# Patient Record
Sex: FEMALE | Race: WHITE | NOT HISPANIC OR LATINO | Employment: OTHER | URBAN - METROPOLITAN AREA
[De-identification: names, ages, dates, MRNs, and addresses within clinical notes are randomized per-mention and may not be internally consistent; named-entity substitution may affect disease eponyms.]

---

## 2017-06-12 ENCOUNTER — LAB (OUTPATIENT)
Dept: LAB | Facility: HOSPITAL | Age: 67
End: 2017-06-12
Attending: INTERNAL MEDICINE

## 2017-06-12 ENCOUNTER — OFFICE VISIT (OUTPATIENT)
Dept: ONCOLOGY | Facility: CLINIC | Age: 67
End: 2017-06-12
Attending: INTERNAL MEDICINE

## 2017-06-12 VITALS
TEMPERATURE: 98.1 F | DIASTOLIC BLOOD PRESSURE: 82 MMHG | BODY MASS INDEX: 35.53 KG/M2 | RESPIRATION RATE: 18 BRPM | WEIGHT: 188.2 LBS | SYSTOLIC BLOOD PRESSURE: 128 MMHG | HEIGHT: 61 IN | OXYGEN SATURATION: 98 % | HEART RATE: 66 BPM

## 2017-06-12 DIAGNOSIS — C50.412 MALIGNANT NEOPLASM OF UPPER-OUTER QUADRANT OF LEFT FEMALE BREAST (HCC): Primary | ICD-10-CM

## 2017-06-12 LAB
ALBUMIN SERPL-MCNC: 4.3 G/DL (ref 3.5–5.2)
ALBUMIN/GLOB SERPL: 1.4 G/DL (ref 1.1–2.4)
ALP SERPL-CCNC: 84 U/L (ref 38–116)
ALT SERPL W P-5'-P-CCNC: 23 U/L (ref 0–33)
ANION GAP SERPL CALCULATED.3IONS-SCNC: 13.5 MMOL/L
AST SERPL-CCNC: 22 U/L (ref 0–32)
BASOPHILS # BLD AUTO: 0.05 10*3/MM3 (ref 0–0.1)
BASOPHILS NFR BLD AUTO: 0.7 % (ref 0–1.1)
BILIRUB SERPL-MCNC: 0.3 MG/DL (ref 0.1–1.2)
BUN BLD-MCNC: 15 MG/DL (ref 6–20)
BUN/CREAT SERPL: 19.2 (ref 7.3–30)
CALCIUM SPEC-SCNC: 9.5 MG/DL (ref 8.5–10.2)
CHLORIDE SERPL-SCNC: 99 MMOL/L (ref 98–107)
CO2 SERPL-SCNC: 27.5 MMOL/L (ref 22–29)
CREAT BLD-MCNC: 0.78 MG/DL (ref 0.6–1.1)
DEPRECATED RDW RBC AUTO: 44.8 FL (ref 37–49)
EOSINOPHIL # BLD AUTO: 0.09 10*3/MM3 (ref 0–0.36)
EOSINOPHIL NFR BLD AUTO: 1.3 % (ref 1–5)
ERYTHROCYTE [DISTWIDTH] IN BLOOD BY AUTOMATED COUNT: 13.4 % (ref 11.7–14.5)
GFR SERPL CREATININE-BSD FRML MDRD: 73 ML/MIN/1.73
GLOBULIN UR ELPH-MCNC: 3 GM/DL (ref 1.8–3.5)
GLUCOSE BLD-MCNC: 99 MG/DL (ref 74–124)
HCT VFR BLD AUTO: 41.2 % (ref 34–45)
HGB BLD-MCNC: 13.8 G/DL (ref 11.5–14.9)
HOLD SPECIMEN: NORMAL
IMM GRANULOCYTES # BLD: 0.02 10*3/MM3 (ref 0–0.03)
IMM GRANULOCYTES NFR BLD: 0.3 % (ref 0–0.5)
LDH SERPL-CCNC: 193 U/L (ref 99–259)
LYMPHOCYTES # BLD AUTO: 2.03 10*3/MM3 (ref 1–3.5)
LYMPHOCYTES NFR BLD AUTO: 29.7 % (ref 20–49)
MCH RBC QN AUTO: 30.3 PG (ref 27–33)
MCHC RBC AUTO-ENTMCNC: 33.5 G/DL (ref 32–35)
MCV RBC AUTO: 90.5 FL (ref 83–97)
MONOCYTES # BLD AUTO: 0.44 10*3/MM3 (ref 0.25–0.8)
MONOCYTES NFR BLD AUTO: 6.4 % (ref 4–12)
NEUTROPHILS # BLD AUTO: 4.2 10*3/MM3 (ref 1.5–7)
NEUTROPHILS NFR BLD AUTO: 61.6 % (ref 39–75)
NRBC BLD MANUAL-RTO: 0 /100 WBC (ref 0–0)
PLATELET # BLD AUTO: 200 10*3/MM3 (ref 150–375)
PMV BLD AUTO: 9.8 FL (ref 8.9–12.1)
POTASSIUM BLD-SCNC: 4.7 MMOL/L (ref 3.5–4.7)
PROT SERPL-MCNC: 7.3 G/DL (ref 6.3–8)
RBC # BLD AUTO: 4.55 10*6/MM3 (ref 3.9–5)
SODIUM BLD-SCNC: 140 MMOL/L (ref 134–145)
URATE SERPL-MCNC: 7.3 MG/DL (ref 2.8–7.4)
WBC NRBC COR # BLD: 6.83 10*3/MM3 (ref 4–10)

## 2017-06-12 PROCEDURE — 85025 COMPLETE CBC W/AUTO DIFF WBC: CPT

## 2017-06-12 PROCEDURE — 99213 OFFICE O/P EST LOW 20 MIN: CPT | Performed by: INTERNAL MEDICINE

## 2017-06-12 PROCEDURE — 80053 COMPREHEN METABOLIC PANEL: CPT

## 2017-06-12 PROCEDURE — 36415 COLL VENOUS BLD VENIPUNCTURE: CPT

## 2017-06-12 PROCEDURE — 84550 ASSAY OF BLOOD/URIC ACID: CPT

## 2017-06-12 PROCEDURE — 83615 LACTATE (LD) (LDH) ENZYME: CPT

## 2017-06-12 NOTE — PROGRESS NOTES
Subjective     REASONS FOR FOLLOWUP:     1. T4N3, ER/TX negative, HER2 positive breast cancer; received dose-dense Adriamycin and Cytoxan and Taxol as well as Herceptin and radiation therapy, further total of a year of Herceptin.   2. Mild decrease in EF although still within the normal range felt to be insignificant.   3. Chronic cough with negative CT scans and normal PFTs in 06/2010 and 12/2010.   4. Genetic testing deferred due to cost.   5. Left axillary skin lesion showing atypical vascular proliferation related to radiation. No signs of overt malignancy.   :     History of Present Illness  patient is a 65-year-old female with a high-risk ER/TX negative HER-2 positive left breast cancer T4 N 3 treated with adjuvant dose dense Adriamycin Cytoxan and Taxol Herceptin and radiation COMPLETED 6 years ago.  She is here for routine follow-up and is doing amazingly well.  Her left arm lymphedema is still an issue and she wraps and wears sleeve to keep it under control.  She is up-to-date with colonoscopies.  She is trying to eat well and exercise and is doing argenis colon : Cleanses with green teas and losing some weight and feeling better overall.  She has no other symptoms currently and this far out from her diagnosis is almost certainly cured from her hormone-negative HER-2 positive cancer    Active Ambulatory Problems     Diagnosis Date Noted   • Breast cancer 06/07/2016     Resolved Ambulatory Problems     Diagnosis Date Noted   • No Resolved Ambulatory Problems     Past Medical History:   Diagnosis Date   • Breast cancer 1990   • Fibroids    • Ovarian cyst    • Skin cancer      Past Surgical History:   Procedure Laterality Date   • BREAST SURGERY Bilateral 2000    hysteroscopy / lobular carcinoma in situ 1990 excision     ONCOLOGIC HISTORY:  The patient had a history of abnormal mammogram in 1990 with biopsy showing lobular carcinoma in situ. Biopsy measured 7 x 5 x 1.5 cm. She had focal atypical lobular  hyperplasia with lobular carcinoma in situ and extensive fibrocystic disease with atypi c al duct hyperplasia. At that time no further therapy or preventative options were given, and the patient was followed closely with mammograms. In 2008 she had an MRI of the breast which revealed a mass at the 1 o'clock position posterior to the nipple m e asuring 1.2 x 1.6 cm which was felt to be a fibroadenoma. This was dated 07/31/08. Mammogram in 2008 was benign. The patient then had a routine mammogram on 10/06/09 at [facility] which showed thickening of the skin and two lymph nodes in the l eft axilla. Ultrasound confirmed a mass in the left upper outer quadrant and thickening of the areolar skin and subareolar tissues, plus two lymph nodes felt to be enlarged. The patient had excisional biopsy by Dr. Daniels at [facility] with removal of an ellipse of skin which showed some peau d'orange and infiltrating ductal carcinoma grade 2 with focal vascular space invasion including in the dermis of the segment of skin attached and positive margins. ER/TX negative, HER2/felice positive. The patient the n underwent left modified radical mastectomy with axillary dissection, and a simple mastectomy on the right, on 11/16/09. The right breast showed atypical lobular hyperplasia and three benign nodes. The left breast showed 1.2 cm area of residual invasive ductal carcinoma grade 3, focal lymphatic invasion of nipple stroma. Margins were clear. There was a fibroadenoma focal lobular carcinoma in situ. Metastatic carcinoma in 8 of 14 lymph nodes with macrometastasis and no extranodal extension. Staging wo rkup including bone scan, CT scans and PET scan, all were negative for metastatic disease.   Decision was made to treat with dose-dense AC followed by weekly Taxol and Herceptin followed by Herceptin for the rest of the year and chest wall radiation. MUGA showed ejection fraction of 72%.   Emend was added with cycle 2 of treatment  because of nausea.   Ejection fraction stable at 68% after four cycles of Adriamycin and Cytoxan. Herceptin and Taxol started around 3-04-10.   The patient tolerated the Taxol and Herceptin well.   Because of crackles in her lungs, CT chest was done on 5/15/10 which showed no abnormalities. PFT's were done and showed a mild restrictive defect. MUGA scan 5/21/10 shows stable EF of 65%. Plan to complete chest wall radiation an d q3 week Herceptin for the rest of the year. No hormonal therapy as she is ER/WI negative.   MUGA scan 8/10 shows EF of 59%. Because it is still not 15 points from her original EF, we are going to repeat the MUGA this time in six weeks instead of 12 weeks and make sure there is no further drop.   MUGA scan 9/17/10 is 57% which is about 15 points from her original EF of 72% which I think is falsely high. I discussed this with Dr. Blade Camargo who agrees that she is still well within the normal range and the o riginal EF is probably falsely high and agrees with continuing Herceptin because of her risk of cardiac disease in this situation and we will follow MUGA scans at eight week intervals.   Repeat MUGA scan dated 11/26/10 showed an ejection fraction of 62% whi ch is up from 57% in September. The patient had a Doppler of the right upper extremity showing evidence of DVT. Lymphedema was felt to be the cause of the swelling. A colonoscopy was done on 10/22/10 which was normal. Herceptin to continue until 03/20 11.   MUGA scan 01/28/11 was stable with an EF of 60%.   The patient was seen on 12/16/11, doing well. Chest x-ray was benign. She has no cardiac symptoms. Port is to be removed in 2-3 months with followup at four month intervals.   Patient seen 4/12 doing well. Genetic testing was delayed because she has a high deductible and she is waiting until the end of the year. She wants to keep her port in for now and I have no problems with this.   Patient is seen in 02/2013 doing very well. Chest  "x-ray appears benign and we will have her port removed.   Patient seen by a dermatologist in May 2013 and some lesions in her axilla were biopsied felt to be possibly recurrent breast cancer initially but then were called post radiation changes w ith atypical vascular proliferation. Patient seen in September 2013 doing well with no new skin lesions. Port has been removed. Chest CT was also done in May 2013 which was benign.   Close followup of axillary skin is planned because of the atypical vascular proliferation and the risk of lymphangiosarcoma.   The patient was seen on 03/16/2015 doing well clinically, 5 years from completion of chemotherapy and 4 years from Herceptin; very high risk disease and with close followup planned for the time being.     Review of Systems   A comprehensive 14 point review of systems was performed and was negative except as mentioned.      Current Outpatient Prescriptions:   •  fluconazole (DIFLUCAN) 200 MG tablet, TK 2 TS PO ONCE WEEKLY, Disp: , Rfl: 0      ALLERGIES:  No Known Allergies    Objective      Vitals:    06/12/17 1320   BP: 128/82   Pulse: 66   Resp: 18   Temp: 98.1 °F (36.7 °C)   TempSrc: Oral   SpO2: 98%   Weight: 188 lb 3.2 oz (85.4 kg)   Height: 61\" (154.9 cm)   PainSc: 0-No pain     Current Status 6/12/2017   ECOG score 0       Physical Exam    GENERAL:  Well-developed, well-nourished in no acute distress.   SKIN:  Warm, dry without rashes, purpura or petechiae.  HEAD:  Normocephalic.  EYES:  Pupils equal, round and reactive to light.  EOMs intact.  Conjunctivae normal.  EARS:  Hearing intact.  NOSE:  Septum midline.  No excoriations or nasal discharge.  MOUTH:  Tongue is well-papillated; no stomatitis or ulcers.  Lips normal.  THROAT:  Oropharynx without lesions or exudates.  NECK:  Supple with good range of motion; no thyromegaly or masses, no JVD.  LYMPHATICS:  No cervical, supraclavicular, axillary or inguinal adenopathy.  CHEST:  Lungs clear to percussion and " auscultation. Good airflow.  BREASTS: Chest wall is benign bilaterally no axillary masses noted  CARDIAC:  Regular rate and rhythm without murmurs, rubs or gallops. Normal S1,S2.  ABDOMEN:  Soft, nontender with no organomegaly or masses.  EXTREMITIES:  No clubbing, cyanosis significant lymphedema in the left arm all the way to the hand  NEUROLOGICAL:  Cranial Nerves II-XII grossly intact.  No focal neurological deficits.  PSYCHIATRIC:  Normal affect and mood.        RECENT LABS:  Hematology WBC   Date Value Ref Range Status   06/12/2017 6.83 4.00 - 10.00 10*3/mm3 Final     RBC   Date Value Ref Range Status   06/12/2017 4.55 3.90 - 5.00 10*6/mm3 Final     Hemoglobin   Date Value Ref Range Status   06/12/2017 13.8 11.5 - 14.9 g/dL Final     Hematocrit   Date Value Ref Range Status   06/12/2017 41.2 34.0 - 45.0 % Final     Platelets   Date Value Ref Range Status   06/12/2017 200 150 - 375 10*3/mm3 Final              Assessment/Plan   1.  T4 N3 ER/ID negative HER-2 positive breast cancer on the left treated with a  aggressive Herceptin based     chemotherapy and radiation and surgery well at the 5 year khalif  2.  Lymphedema left arm stable  3.  Atypical vascular proliferation left axilla resolved    Plan   return in 1 year for follow-up  Colonoscopy per schedule                  6/12/2017      CC:

## 2017-06-13 ENCOUNTER — OFFICE VISIT (OUTPATIENT)
Dept: INTERNAL MEDICINE | Facility: CLINIC | Age: 67
End: 2017-06-13

## 2017-06-13 VITALS
HEART RATE: 77 BPM | WEIGHT: 189.4 LBS | BODY MASS INDEX: 35.76 KG/M2 | HEIGHT: 61 IN | TEMPERATURE: 98 F | OXYGEN SATURATION: 97 %

## 2017-06-13 DIAGNOSIS — Z00.00 MEDICARE ANNUAL WELLNESS VISIT, SUBSEQUENT: Primary | ICD-10-CM

## 2017-06-13 DIAGNOSIS — I89.0 LYMPHEDEMA OF UPPER EXTREMITY: ICD-10-CM

## 2017-06-13 PROCEDURE — 90471 IMMUNIZATION ADMIN: CPT | Performed by: FAMILY MEDICINE

## 2017-06-13 PROCEDURE — 99213 OFFICE O/P EST LOW 20 MIN: CPT | Performed by: FAMILY MEDICINE

## 2017-06-13 PROCEDURE — G0439 PPPS, SUBSEQ VISIT: HCPCS | Performed by: FAMILY MEDICINE

## 2017-06-13 PROCEDURE — 90715 TDAP VACCINE 7 YRS/> IM: CPT | Performed by: FAMILY MEDICINE

## 2017-06-14 ENCOUNTER — TELEPHONE (OUTPATIENT)
Dept: INTERNAL MEDICINE | Facility: CLINIC | Age: 67
End: 2017-06-14

## 2017-06-14 DIAGNOSIS — C50.412 MALIGNANT NEOPLASM OF UPPER-OUTER QUADRANT OF LEFT FEMALE BREAST (HCC): Primary | ICD-10-CM

## 2017-06-14 DIAGNOSIS — I89.0 LYMPHEDEMA OF LEFT ARM: ICD-10-CM

## 2017-06-14 NOTE — TELEPHONE ENCOUNTER
Need reason for physical therapy referral.  Diagnosis given was Medicare annual Wellness.  Please advise.

## 2017-06-21 ENCOUNTER — HOSPITAL ENCOUNTER (OUTPATIENT)
Dept: PHYSICAL THERAPY | Facility: HOSPITAL | Age: 67
Setting detail: THERAPIES SERIES
Discharge: HOME | End: 2017-06-21

## 2017-06-21 DIAGNOSIS — I97.2 POSTMASTECTOMY LYMPHEDEMA SYNDROME: Primary | ICD-10-CM

## 2017-06-21 PROCEDURE — 97162 PT EVAL MOD COMPLEX 30 MIN: CPT | Performed by: PHYSICAL THERAPIST

## 2017-06-21 PROCEDURE — G8984 CARRY CURRENT STATUS: HCPCS | Performed by: PHYSICAL THERAPIST

## 2017-06-21 PROCEDURE — G8985 CARRY GOAL STATUS: HCPCS | Performed by: PHYSICAL THERAPIST

## 2017-06-21 PROCEDURE — 97530 THERAPEUTIC ACTIVITIES: CPT | Performed by: PHYSICAL THERAPIST

## 2017-06-21 PROCEDURE — 97110 THERAPEUTIC EXERCISES: CPT | Performed by: PHYSICAL THERAPIST

## 2017-06-21 NOTE — THERAPY EVALUATION
Physical Therapy Lymphedema Initial Evaluation  [facility]     Patient Name: America Crews  : 1950  MRN: [id number]  Today's Date: 2017      Visit Date: 2017    Visit Dx:    ICD-10-CM ICD-9-CM   1. 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 Andersen, 8/15 L ALN (+) 0/3 R ALN (+), completed chemoradiation. Oncologist Dr. Nettles. Did not take a hormone blocker. Started lymphedema treatment at [facility] . Another round in  under Dr. Valdez's care due to flare up. Pretty stable since that time. Has not had any new products since . Does have daytime custom compression sleeve and glove for left UE, OTS daytime compression sleeve for right UE, night custom compression sleeve for left, and flexitouch home pump for left UE and chest wall/abdomen. Patient states noted increased edema posterior hand. Wants to get swelling down and receive new products.   -SC      Previous treatment for THIS PROBLEM Other (comment)   formal lymphedema therapy 2014  -SC      Onset Date- PT 2017  -SC      Patient/Caregiver Goals Know  what to do to help the symptoms;Decrease swelling  -SC      Current Tobacco Use none  -SC      Smoking Status no  -SC      Patient's Rating of General Health Good  -SC      Hand Dominance right-handed  -SC      Occupation/sports/leisure activities yardwork  -SC      Patient seeing anyone else for problem(s)? No  -SC      How has patient tried to help current problem? compression, pump  -SC      Are you or can you be pregnant No  -SC      Pain     Pain Location Arm;Hand   left  -SC      Pain at Present 3  -SC      Pain at Best 2  -SC      Pain at Worst 5  -SC      Pain Frequency Constant/continuous  -SC      Pain Description Aching;Heaviness;Tightness   swollen  -SC      What Performance Factors Make the Current Problem(s) WORSE? increased activity, use of right UE, dependent position  -SC      What Performance Factors Make the Current Problem(s) BETTER? compression, massage  -SC      Pain Comments mostly left dorsal hand  -SC      Fall Risk Assessment    Any falls in the past year: No  -SC      Previous Functional Level --   independent  -SC      Services    Do you plan to receive Home Health services in the near future No  -SC      Daily Activities    Primary Language English  -SC      Are you able to read Yes  -SC      Are you able to write Yes  -SC      How does patient learn best? Listening;Reading;Demonstration;Pictures/Video  -SC      Teaching needs identified Home Exercise Program;Management of Condition  -SC      Patient is concerned about/has problems with Difficulty with self care (i.e. bathing, dressing, toileting:;Grasping objects lifting;Performing home management (household chores, shopping, care of dependents);Reaching over head;Repetitive movements of the hand, arm, shoulder;Writing/grasping items with hand(s)  -SC      Does patient have problems with the following? --   none listed  -SC      Barriers to learning None  -SC      Explanation of Functional Status Problem independent  -SC       Recommended Referrals --   none  -SC      Pt Participated in POC and Goals Yes  -SC      Safety    Are you being hurt, hit, or frightened by anyone at home or in your life? No  -SC      Are you being neglected by a caregiver No  -SC        User Key  (r) = Recorded By, (t) = Taken By, (c) = Cosigned By    Initials Name Provider Type    SC Danika Hutchins, PT Physical Therapist                Lymphedema       06/21/17 2506          Subjective Comments    Subjective Comments I have just had more swelling and pain so I was wanting to get my lymphedema back under control. I work a lot in the yard and both arms swell with that.   -SC      Lymphedema Assessment    Lymphedema Classification secondary;LUE:;stage 2 (Spontaneously Irreversible);RUE:;stage 1 (Spontaneously Reversible)  -SC      Lymphedema Cancer Related Sx axillary dissection;radical mastectomy;sentinel node biopsy;simple mastectomy  -SC      Lymphedema Surgery Comments right prophylaxis, left modified radical, no reconstruction  -SC      Lymph Nodes Removed # 15  -SC      Positive Lymph Nodes # 8  -SC      Cancer Comments left 8/15 ALN (+); R 0/3  -SC      Chemo Received yes  -SC      Chemo Treatments #/Timeframe AC, taxol, and yearly Herceptin  -SC      Radiation Therapy Received yes  -SC      Radiation Treatments #/Timeframe left chest wall  -SC      Infections or Cellulitis? no  -SC      Subjective Pain    Able to rate subjective pain? yes  -SC      Pre-Treatment Pain Level 3  -SC      Post-Treatment Pain Level 3  -SC      Lymphedema Edema Assessment    Ptting Edema Category By grade out of 4  -SC      Pitting Edema + 2/4;Mild;Moderate   left  -SC      Edema Assessment Comment left UE grossly, right posterior arm distal superior to elbow  -SC      Skin Changes/Observations    Skin Observations Comment scar tissue with myofascial restrictions left chest wall at mastectomy scar line region, lateral flank with mild left axillary cording syndrome  -SC       Lymphedema Sensation    Lymphedema Sensation Tests light touch  -SC      Lymphedema Light Touch LUE:;mild impairment;RUE:;WNL  -SC      Lymphedema Pulses/Capillary Refill    Lymphedema Pulses/Capillary Refill capillary refill  -SC      Capillary Refill upper extremity capillary refill  -SC      Upper Extremity Capillary Refill right:;left:;less than 3 seconds  -SC      Lymphedema Measurements    Measurement Type(s) Circumferential  -SC      Circumferential Areas Upper extremities  -SC      LUE Circumferential (cm)    Measurement Location 1 axilla  -SC      Left 1 35 cm  -SC      Measurement Location 2 15 cm above elbow  -SC      Left 2 33.5 cm  -SC      Measurement Location 3 10 cm above elbow  -SC      Left 3 33 cm  -SC      Measurement Location 4 5 cm above elbow  -SC      Left 4 32.5 cm  -SC      Measurement Location 5 elbow  -SC      Left 5 28.2 cm  -SC      Measurement Location 6 5 cm below elbow  -SC      Left 6 31.5 cm  -SC      Measurement Location 7 10 cm below elbow  -SC      Left 7 30.2 cm  -SC      Measurement Location 8 15 cm below elbow  -SC      Left 8 27 cm  -SC      Measurement Location 9 20 cm below elbow  -SC      Left 9 19.5 cm  -SC      Measurement Location 10 wrist  -SC      Left 10 18.5 cm  -SC      Measurement Location 11 midpalm  -SC      Left 11 20.4 cm  -SC      Measurement Location 12 MCPs  -SC      Left 12 19 cm  -SC      Measurement Location 13 fingers net total  -SC      Left 13 73.3 cm  -SC      Measurement Location 14 net total L UE  -SC      Left 14 401.6 cm  -SC      Measurement Location 15 net decrease since initial evaluation  -SC      RUE Circumferential (cm)    Measurement Location 1 axilla  -SC      Right 1 33 cm  -SC      Measurement Location 2 15 cm above elbow  -SC      Right 2 32 cm  -SC      Measurement Location 3 10 cm above elbow  -SC      Right 3 31 cm  -SC      Measurement Location 4 5 cm above elbow  -SC      Right 4 29.5 cm  -SC      Measurement Location 5 elbow   -SC      Right 5 25.5 cm  -SC      Measurement Location 6 5 cm below elbow  -SC      Right 6 26.5 cm  -SC      Measurement Location 7 10 cm below elbow  -SC      Right 7 20.4 cm  -SC      Measurement Location 8 15 cm below elbow  -SC      Right 8 20 cm  -SC      Measurement Location 9 20 cm below elbow  -SC      Right 9 18.1 cm  -SC      Measurement Location 10 wrist   -SC      Right 10 17.1 cm  -SC      Measurement Location 11 mid palm  -SC      Right 11 17.2 cm  -SC      Measurement Location 12 MCPs  -SC      Right 12 17.1 cm  -SC      Measurement Location 13 fingers net total  -SC      Right 13 73.2 cm  -SC      Measurement Location 14 net total R UE  -SC      Right 14 360.6 cm  -SC      Measurement Location 15 net difference L > R   -SC      Right 15 41 cm  -SC      Compression/Skin Care    Compression/Skin Care Comments patient presents with custom sleeve and glove left UE however garments from 2014, worn out and not appropriate fit. Will benefit from new garments bilaterally (R OTS, L custom)  -SC        User Key  (r) = Recorded By, (t) = Taken By, (c) = Cosigned By    Initials Name Provider Type    MAIKOL Hutchins PT Physical Therapist                                Therapy Education       06/21/17 0980          Therapy Education    Given HEP;Symptoms/condition management;Pain management;Edema management;Bandaging/dressing change;Other (comment)   complete decongestive therapy, conservative treatment options, garment options, formal therapy POC, prognosis  -SC      Program New  -SC      How Provided Verbal;Demonstration;Written  -SC      Provided to Patient  -SC      Level of Understanding Teach back education performed;Verbalized;Demonstrated  -SC        User Key  (r) = Recorded By, (t) = Taken By, (c) = Cosigned By    Initials Name Provider Type    MAIKOL Hutchins PT Physical Therapist                  Exercises       06/21/17 0944          Subjective Comments    Subjective Comments I have  just had more swelling and pain so I was wanting to get my lymphedema back under control. I work a lot in the yard and both arms swell with that.   -SC      Subjective Pain    Able to rate subjective pain? yes  -SC      Pre-Treatment Pain Level 3  -SC      Post-Treatment Pain Level 3  -SC        User Key  (r) = Recorded By, (t) = Taken By, (c) = Cosigned By    Initials Name Provider Type    SC Danika Hutchins, PT Physical Therapist                              PT OP Goals       06/21/17 0945       PT Short Term Goals    STG Date to Achieve 07/05/17  -SC     STG 1 Patient independent and compliant with initial home exercise program focused on diaphragmatic breathing, range of motion, flexibility to decrease edema and improve lymphatic flow for decreased edema and decreased risk of infection.   -SC     STG 1 Progress New  -SC     STG 2 Patient demonstrate proper awareness of What is Lymphedema and 18 Steps of Prevention for improved prevention, management, care of symptoms and ease of transition to self-care of condition.   -SC     STG 2 Progress New  -SC     STG 3 Patient independent and compliant with self-wrapping techniques of compression bandages with spouse/family member as indicated for improved self-management of condition.   -SC     STG 3 Progress New  -SC     STG 4 Patient demonstrate decreased net edema of left upper extremity >/= 15 cm for decrease in edema, symptoms, decreased risk of infection and improved skin care/transition to self-care of condition.   -SC     STG 4 Progress New  -SC     Long Term Goals    LTG Date to Achieve 07/19/17  -SC     LTG 1 Patient independent and compliant with advanced Home Exercise Program for self-management of condition.   -SC     LTG 1 Progress New  -SC     LTG 2 Patient independent and compliant with compression garments as indicated for self-management of condition.   -SC     LTG 2 Progress New  -SC     LTG 3 Patient independent and compliant with home pneumatic  compression pump for transition to self-care of condition.  -SC     LTG 3 Progress New  -SC     LTG 4 Patient demonstrate decreased net edema of left upper extremity >/= 30 cm for decrease in edema, symptoms, decreased risk of infection and improved skin care/transition to self-care of condition.   -SC     LTG 4 Progress New  -SC     Time Calculation    PT Goal Re-Cert Due Date 07/19/17  -SC       User Key  (r) = Recorded By, (t) = Taken By, (c) = Cosigned By    Initials Name Provider Type    SC Danika Hutchins, PT Physical Therapist                PT Assessment/Plan       06/21/17 2149       PT Assessment    Functional Limitations Limitation in home management;Limitations in community activities;Performance in leisure activities;Performance in self-care ADL;Other (comment)   yardwork  -SC     Impairments Edema;Impaired flexibility;Impaired lymphatic circulation;Integumentary integrity;Muscle strength;Pain;Poor body mechanics;Sensation  -SC     Assessment Comments Mrs. 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 Patient has had formal lymphatic treatment in the past 2011, 2014. At this time she is having an increase in symptoms with pain and limited function due to edema. She is at risk for infection and worsening of symptoms at this time. She will benefit from formal lymphatic therapy including complete decongestive therapy techniques to allow for optimal rehabilitation.   -SC     Please refer to paper survey for additional self-reported information Yes  -SC     Rehab Potential Good  -SC     Patient/caregiver participated in establishment of treatment plan and goals Yes  -SC     Patient would benefit from skilled therapy intervention Yes  -SC     PT Plan    PT Frequency 3x/week;4x/week  -SC     Predicted Duration of Therapy Intervention (days/wks) 4-8 weeks  -SC     Planned CPT's? PT EVAL MOD  COMPLELITY: 62427;PT RE-EVAL: 33779;PT THER PROC EA 15 MIN: 26364;PT THER ACT EA 15 MIN: 86586;PT MANUAL THERAPY EA 15 MIN: 15728;PT NEUROMUSC RE-EDUCATION EA 15 MIN: 16983;PT SELF CARE/HOME MGMT/TRAIN EA 15: 68442;PT BIS XTRACELL FLUID ANALYSIS: 45185  -SC     PT Plan Comments bioimpedance, initiate compression bandaging, MLD at second session, initiate flexibility program, possible compression tank top, have Tactile assess flexitouch.   -SC       User Key  (r) = Recorded By, (t) = Taken By, (c) = Cosigned By    Initials Name Provider Type    SC Danika Hutchins, PT Physical Therapist                 Outcome Measures       06/21/17 0945          DASH    Open a tight or new jar. 2  -SC      Write 1  -SC      Turn a key 1  -SC      Prepare a meal 1  -SC      Push open a heavy door 2  -SC      Place an object on a shelf above your head 2  -SC      Do heavy household chores (e.g., wash walls, wash floors) 1  -SC      Garden or do yard work 3  -SC      Make a bed 1  -SC      Carry a shopping bag or briefcase 2  -SC      Carry a heavy object (over 10 lbs) 2  -SC      Change a lightbulb overhead 2  -SC      Wash or blow dry your hair 1  -SC      Wash your back 1  -SC      Put on a pullover sweater 1  -SC      Use a knife to cut food 1  -SC      Recreational activities in which require little effort (e.g., cardplaying, knitting, etc.) 1  -SC      Recreational activities in which you take some force or impact through your arm, should or hand (e.g. golf, hammering, tennis, etc.) 2  -SC      Recreational Activities in which you move your arm freely (e.g., frisbee, badminton, etc.) 1  -SC      Manage transportation needs (getting from one place to another) 1  -SC      During the past week, to what extent has your arm, shoulder, or hand problem interfered with your normal social activites with family, friends, neighbors or groups? 1  -SC      During the past week, were you limited in your work or other regular daily  activities as a result of your arm, shoulder or hand problem? 2  -SC      Arm, Shoulder, or hand pain 2  -SC      Arm, shoulder or hand pain when you performed any specific activity 2  -SC      Tingling (pins and needles) in your arm, shoulder, or hand 1  -SC      Weakness in your arm, shoulder or hand 2  -SC      Stiffness in your arm, shoulder or hand 1  -SC      During the past week, how much difficulty have you had sleeping because of the pain in your arm, shoulder or hand? 1  -SC      I feel less capable, less confident or less useful because of my arm, shoulder or hand problem 3  -SC      DASH Sum  44  -SC      Number of Questions Answered 29  -SC      DASH Score 12.93  -SC      Functional Assessment    Outcome Measure Options Disabilities of the Arm, Shoulder, and Hand (DASH)  -SC        User Key  (r) = Recorded By, (t) = Taken By, (c) = Cosigned By    Initials Name Provider Type    SC Danika Hutchins, PT Physical Therapist            Time Calculation:   Start Time: 0945  Stop Time: 1040  Time Calculation (min): 55 min     Therapy Charges for Today     Code Description Service Date Service Provider Modifiers Qty    66746812721 HC PT CARRY MOV HAND OBJ CURRENT 6/21/2017 Danika Hutchins, PT GP, CK 1    52600405691 HC PT CARRY MOV HAND OBJ PROJECTED 6/21/2017 Danika Hutchins, PT GP, CI 1    81848393780 HC PT THER PROC EA 15 MIN 6/21/2017 Danika Hutchins PT GP 1    10004159863 HC PT THERAPEUTIC ACT EA 15 MIN 6/21/2017 Danika Hutchins PT  1    [id number]  PT EVAL MOD COMPLEXITY 2 6/21/2017 Danika Hutchins, PT  1          PT G-Codes  PT Professional Judgement Used?: Yes  Outcome Measure Options: Disabilities of the Arm, Shoulder, and Hand (DASH)  Score: 13/100  Functional Limitation: Carrying, moving and handling objects  Carrying, Moving and Handling Objects Current Status (): At least 40 percent but less than 60 percent impaired, limited or restricted  Carrying, Moving and Handling  Objects Goal Status (): At least 1 percent but less than 20 percent impaired, limited or restricted         Danika Renee, PT  6/21/2017

## 2017-06-23 ENCOUNTER — APPOINTMENT (OUTPATIENT)
Dept: PHYSICAL THERAPY | Facility: HOSPITAL | Age: 67
End: 2017-06-23

## 2017-06-28 ENCOUNTER — APPOINTMENT (OUTPATIENT)
Dept: PHYSICAL THERAPY | Facility: HOSPITAL | Age: 67
End: 2017-06-28

## 2017-06-30 ENCOUNTER — APPOINTMENT (OUTPATIENT)
Dept: PHYSICAL THERAPY | Facility: HOSPITAL | Age: 67
End: 2017-06-30

## 2017-07-05 ENCOUNTER — APPOINTMENT (OUTPATIENT)
Dept: PHYSICAL THERAPY | Facility: HOSPITAL | Age: 67
End: 2017-07-05

## 2017-07-05 ENCOUNTER — HOSPITAL ENCOUNTER (OUTPATIENT)
Dept: PHYSICAL THERAPY | Facility: HOSPITAL | Age: 67
Setting detail: THERAPIES SERIES
Discharge: HOME | End: 2017-07-05

## 2017-07-05 DIAGNOSIS — I97.2 POSTMASTECTOMY LYMPHEDEMA SYNDROME: Primary | ICD-10-CM

## 2017-07-05 PROCEDURE — 97110 THERAPEUTIC EXERCISES: CPT | Performed by: PHYSICAL THERAPIST

## 2017-07-05 PROCEDURE — 97530 THERAPEUTIC ACTIVITIES: CPT | Performed by: PHYSICAL THERAPIST

## 2017-07-05 NOTE — THERAPY TREATMENT NOTE
Outpatient Physical Therapy Lymphedema Treatment Note  [facility]     Patient Name: America Crews  : 1950  MRN: [id number]  Today's Date: 2017        Visit Date: 2017    Visit Dx:    ICD-10-CM ICD-9-CM   1. 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 My hand is about the same. I have wrapped myself in the past but I know I need to be taught how to do it correctly again. I haven't been wrapped since . I do have the flexitouch pump. I checked that out.   -SC      Subjective Pain    Able to rate subjective pain? yes  -SC      Pre-Treatment Pain Level 2  -SC      Post-Treatment Pain Level 2  -SC      Subjective Pain Comment discomfort hand with swelling  -SC      Compression/Skin Care    Compression/Skin Care skin care;wrapping location;bandaging;compression garment  -SC      Skin Care other (comment)   discussion of skin care and coconut oil  -SC      Wrapping Location upper extremity  -SC      Wrapping Location UE left:;fingers to axilla  -SC      Wrapping Comments stockinette Tg6, transelast x 3, artiflex x 2, rosidal K 6, 8 x2, 10 (may require a 12 next session)  -SC      Bandage Layers cotton liner;full limb;short-stretch bandages (comment size/quantity)  -SC      Bandaging Technique circumferential/spiral;figure-eight;light compression  -SC      Compression Garment Comments patient presents with custom garments donned correctly however worn due to from . Patient brought in products of night sleeves, night vest, daytime OTS garments, and old bandages. Noted inappropriate fit of all garments due to age, wear and tear of garments  -SC        User Key  (r) = Recorded By, (t) =  Taken By, (c) = Cosigned By    Initials Name Provider Type    MAIKOL Hutchins PT Physical Therapist                              PT Assessment/Plan       07/05/17 0900       PT Assessment    Assessment Comments Mrs. Crews returns for first f/u after initial evaluation for chronic left post mastectomy lymphedema syndrome of the UE. We assessed her current products including bandages, sleeve, night garments and pump. We initiated compression wrapping of left UE fingers to axilla today. Patient to return tomorrow to assess progress and modify as needed.   -SC     PT Plan    PT Plan Comments assess compression bandaging.   -SC       User Key  (r) = Recorded By, (t) = Taken By, (c) = Cosigned By    Initials Name Provider Type    MAIKOL Hutchins PT Physical Therapist                     Exercises       07/05/17 0900          Subjective Comments    Subjective Comments I brought everything I have ever had with me for you to check out. My hand is about the same. I have wrapped myself in the past but I know I need to be taught how to do it correctly again. I haven't been wrapped since 2015. I do have the flexitouch pump. I checked that out.   -SC      Subjective Pain    Able to rate subjective pain? yes  -SC      Pre-Treatment Pain Level 2  -SC      Post-Treatment Pain Level 2  -SC      Subjective Pain Comment discomfort hand with swelling  -SC      Exercise 1    Exercise Name 1 instructed elevation and stress ball and diaphragmatic breathing.   -SC        User Key  (r) = Recorded By, (t) = Taken By, (c) = Cosigned By    Initials Name Provider Type    MAIKOL Hutchins PT Physical Therapist                              PT OP Goals       07/05/17 0900       PT Short Term Goals    STG Date to Achieve 07/05/17  -SC     STG 1 Patient independent and compliant with initial home exercise program focused on diaphragmatic breathing, range of motion, flexibility to decrease edema and improve lymphatic flow for  decreased edema and decreased risk of infection.   -SC     STG 1 Progress Progressing  -SC     STG 1 Progress Comments instructed today  -SC     STG 2 Patient demonstrate proper awareness of What is Lymphedema and 18 Steps of Prevention for improved prevention, management, care of symptoms and ease of transition to self-care of condition.   -SC     STG 2 Progress Progressing  -SC     STG 2 Progress Comments reviewed stages and self-management today  -SC     STG 3 Patient independent and compliant with self-wrapping techniques of compression bandages with spouse/family member as indicated for improved self-management of condition.   -SC     STG 3 Progress Progressing  -SC     STG 3 Progress Comments initiated compression bandages today  -SC     STG 4 Patient demonstrate decreased net edema of left upper extremity >/= 15 cm for decrease in edema, symptoms, decreased risk of infection and improved skin care/transition to self-care of condition.   -[facility] 4 Progress Ongoing  -SC     Long Term Goals    LTG Date to Achieve 07/19/17  -SC     LTG 1 Patient independent and compliant with advanced Home Exercise Program for self-management of condition.   -SC     LTG 1 Progress Ongoing  -SC     LTG 2 Patient independent and compliant with compression garments as indicated for self-management of condition.   -SC     LTG 2 Progress Ongoing  -SC     LTG 3 Patient independent and compliant with home pneumatic compression pump for transition to self-care of condition.  -SC     LTG 3 Progress Ongoing  -SC     LTG 4 Patient demonstrate decreased net edema of left upper extremity >/= 30 cm for decrease in edema, symptoms, decreased risk of infection and improved skin care/transition to self-care of condition.   -SC     LTG 4 Progress Ongoing  -SC     Time Calculation    PT Goal Re-Cert Due Date 07/19/17  -SC       User Key  (r) = Recorded By, (t) = Taken By, (c) = Cosigned By    Initials Name Provider Type    MAIKOL WOODS  BENJA Hutchins Physical Therapist                Therapy Education       07/05/17 0900          Therapy Education    Given HEP;Symptoms/condition management;Pain management;Edema management;Bandaging/dressing change;Other (comment)   progression of care  -SC      Program New  -SC      How Provided Verbal;Demonstration;Written  -SC      Provided to Patient  -SC      Level of Understanding Teach back education performed;Verbalized;Demonstrated  -SC        User Key  (r) = Recorded By, (t) = Taken By, (c) = Cosigned By    Initials Name Provider Type    SC Danika Hutchins PT Physical Therapist                Time Calculation:   Start Time: 0900  Stop Time: 0945  Time Calculation (min): 45 min     Therapy Charges for Today     Code Description Service Date Service Provider Modifiers Qty    39583759362  PT THER PROC EA 15 MIN 7/5/2017 Danika Hutchins, PT GP 1    [id number]  PT THERAPEUTIC ACT EA 15 MIN 7/5/2017 Danika Hutchins PT GP 2                    Danika Renee PT  7/5/2017

## 2017-07-06 ENCOUNTER — HOSPITAL ENCOUNTER (OUTPATIENT)
Dept: PHYSICAL THERAPY | Facility: HOSPITAL | Age: 67
Setting detail: THERAPIES SERIES
Discharge: HOME | End: 2017-07-06

## 2017-07-06 DIAGNOSIS — I97.2 POSTMASTECTOMY LYMPHEDEMA SYNDROME: Primary | ICD-10-CM

## 2017-07-06 PROCEDURE — 97110 THERAPEUTIC EXERCISES: CPT | Performed by: PHYSICAL THERAPIST

## 2017-07-06 NOTE — THERAPY TREATMENT NOTE
Outpatient Physical Therapy Lymphedema Treatment Note  [facility]     Patient Name: America Crews  : 1950  MRN: [id number]  Today's Date: 2017        Visit Date: 2017    Visit Dx:    ICD-10-CM ICD-9-CM   1. 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 No issues. I hope it works.   -SC      Subjective Pain    Able to rate subjective pain? yes  -SC      Pre-Treatment Pain Level 1  -SC      Post-Treatment Pain Level 1  -SC      Subjective Pain Comment tightness with removal of garment but states felt better with bandages on  -SC      LUE Circumferential (cm)    Measurement Location 1 axilla  -SC      Left 1 32.7 cm  -SC      Measurement Location 2 15 cm above elbow  -SC      Measurement Location 3 10 cm above elbow  -SC      Left 3 31 cm  -SC      Measurement Location 4 5 cm above elbow  -SC      Measurement Location 5 elbow  -SC      Left 5 27 cm  -SC      Measurement Location 6 5 cm below elbow  -SC      Measurement Location 7 10 cm below elbow  -SC      Left 7 28 cm  -SC      Measurement Location 8 15 cm below elbow  -SC      Measurement Location 9 20 cm below elbow  -SC      Left 9 20 cm  -SC      Measurement Location 10 wrist  -SC      Left 10 17 cm  -SC      Measurement Location 11 midpalm  -SC      Left 11 19.5 cm  -SC      Measurement Location 12 MCPs  -SC      Left 12 18.3 cm  -SC      Measurement Location 13 fingers net total  -SC      Measurement Location 14 net total L UE  -SC      Left 14 193.5 cm  -SC      Measurement Location 15 net decrease since initial evaluation  -SC      Left 15 -10.3 cm  -SC      Compression/Skin Care    Compression/Skin Care wrapping location;bandaging;compression garment;skin care   -SC      Skin Care washed/dried  -SC      Wrapping Location upper extremity  -SC      Wrapping Location UE left:;fingers to axilla  -SC      Wrapping Comments stockinette Tg6, komprex II channel foam pad posterior hand to mid dorsal forearm, transelast x 2, artiflex x 2, rosidal soft x 1 from wrist to axilla, rosidal K 6, 8 x2, 10  -SC      Bandage Layers cotton liner;full limb;short-stretch bandages (comment size/quantity)  -SC      Bandaging Technique circumferential/spiral;figure-eight;light compression  -SC      Bandaging Comments trial of rosidal soft and komprex II channel foam pad  -SC        User Key  (r) = Recorded By, (t) = Taken By, (c) = Cosigned By    Initials Name Provider Type    MAIKOL Hutchins, PT Physical Therapist                              PT Assessment/Plan       07/06/17 1320       PT Assessment    Assessment Comments No adverse reactions noted from compression bandaging day 1. Noted increased pitting edema left dorsal hand and increased fibrotic edema left volar forearm. Trial of komprex II channel foam pad to dorsal hand and rosidal soft foam from wrist to axilla. Will reassess tomorrow for optimal outcomes over weekend.   -SC     PT Plan    PT Plan Comments assess addition of foam to compression bandaging.   -SC       User Key  (r) = Recorded By, (t) = Taken By, (c) = Cosigned By    Initials Name Provider Type    MAIKOL Hutchins, PT Physical Therapist                     Exercises       07/06/17 1320          Subjective Comments    Subjective Comments I did just fine with the wrapping. No issues. I hope it works.   -SC      Subjective Pain    Able to rate subjective pain? yes  -SC      Pre-Treatment Pain Level 1  -SC      Post-Treatment Pain Level 1  -SC      Subjective Pain Comment tightness with removal of garment but states felt better with bandages on  -SC        User Key  (r) = Recorded By, (t) = Taken By, (c) = Cosigned By    Initials Name Provider Type    MAIKOL WOODS  Cuong, BENJA Physical Therapist                              PT OP Goals       07/06/17 1320       PT Short Term Goals    STG Date to Achieve 07/05/17  -SC     STG 1 Patient independent and compliant with initial home exercise program focused on diaphragmatic breathing, range of motion, flexibility to decrease edema and improve lymphatic flow for decreased edema and decreased risk of infection.   -SC     STG 1 Progress Progressing  -SC     STG 1 Progress Comments reviewed  -SC     STG 2 Patient demonstrate proper awareness of What is Lymphedema and 18 Steps of Prevention for improved prevention, management, care of symptoms and ease of transition to self-care of condition.   -SC     STG 2 Progress Progressing  -SC     STG 2 Progress Comments reviewed  -SC     STG 3 Patient independent and compliant with self-wrapping techniques of compression bandages with spouse/family member as indicated for improved self-management of condition.   -SC     STG 3 Progress Progressing  -SC     STG 3 Progress Comments modified with foam today  -SC     STG 4 Patient demonstrate decreased net edema of left upper extremity >/= 15 cm for decrease in edema, symptoms, decreased risk of infection and improved skin care/transition to self-care of condition.   -SC     STG 4 Progress Progressing  -SC     STG 4 Progress Comments -10.5 cm currently  -SC     Long Term Goals    LTG Date to Achieve 07/19/17  -SC     LTG 1 Patient independent and compliant with advanced Home Exercise Program for self-management of condition.   -SC     LTG 1 Progress Ongoing  -SC     LTG 2 Patient independent and compliant with compression garments as indicated for self-management of condition.   -SC     LTG 2 Progress Ongoing  -SC     LTG 3 Patient independent and compliant with home pneumatic compression pump for transition to self-care of condition.  -SC     LTG 3 Progress Ongoing  -SC     LTG 4 Patient demonstrate decreased net edema of left upper extremity >/=  30 cm for decrease in edema, symptoms, decreased risk of infection and improved skin care/transition to self-care of condition.   -SC     LTG 4 Progress Ongoing  -SC     Time Calculation    PT Goal Re-Cert Due Date 07/19/17  -SC       User Key  (r) = Recorded By, (t) = Taken By, (c) = Cosigned By    Initials Name Provider Type    MAIKOL Hutchins PT Physical Therapist                Therapy Education       07/06/17 1320          Therapy Education    Given HEP;Symptoms/condition management;Pain management;Edema management;Bandaging/dressing change  -SC      Program Modified  -SC      How Provided Verbal;Demonstration  -SC      Provided to Patient  -SC      Level of Understanding Teach back education performed;Verbalized;Demonstrated  -SC        User Key  (r) = Recorded By, (t) = Taken By, (c) = Cosigned By    Initials Name Provider Type    MAIKOL Hutchins PT Physical Therapist                Time Calculation:   Start Time: 1320  Stop Time: 1405  Time Calculation (min): 45 min     Therapy Charges for Today     Code Description Service Date Service Provider Modifiers Qty    [id number]  PT THER PROC EA 15 MIN 7/6/2017 Danika Hutchins, PT GP 3                    Danika Renee PT  7/6/2017

## 2017-07-07 ENCOUNTER — APPOINTMENT (OUTPATIENT)
Dept: PHYSICAL THERAPY | Facility: HOSPITAL | Age: 67
End: 2017-07-07

## 2017-07-07 ENCOUNTER — HOSPITAL ENCOUNTER (OUTPATIENT)
Dept: PHYSICAL THERAPY | Facility: HOSPITAL | Age: 67
Setting detail: THERAPIES SERIES
Discharge: HOME | End: 2017-07-07

## 2017-07-07 DIAGNOSIS — I97.2 POSTMASTECTOMY LYMPHEDEMA SYNDROME: Primary | ICD-10-CM

## 2017-07-07 PROCEDURE — 97110 THERAPEUTIC EXERCISES: CPT | Performed by: PHYSICAL THERAPIST

## 2017-07-07 NOTE — THERAPY TREATMENT NOTE
Outpatient Physical Therapy Lymphedema Treatment Note  [facility]     Patient Name: America Crews  : 1950  MRN: [id number]  Today's Date: 2017        Visit Date: 2017    Visit Dx:    ICD-10-CM ICD-9-CM   1. 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 Went to a play last night so kept my arm propped during the show. The bandages don't bother me, just hot and an inconvenience. I am doing fine.   -SC      Subjective Pain    Able to rate subjective pain? yes  -SC      Pre-Treatment Pain Level 0  -SC      Post-Treatment Pain Level 0  -SC      LUE Circumferential (cm)    Measurement Location 1 axilla  -SC      Left 1 33.5 cm  -SC      Measurement Location 2 15 cm above elbow  -SC      Measurement Location 3 10 cm above elbow  -SC      Left 3 32 cm  -SC      Measurement Location 4 5 cm above elbow  -SC      Measurement Location 5 elbow  -SC      Left 5 27.5 cm  -SC      Measurement Location 6 5 cm below elbow  -SC      Measurement Location 7 10 cm below elbow  -SC      Left 7 28.8 cm  -SC      Measurement Location 8 15 cm below elbow  -SC      Measurement Location 9 20 cm below elbow  -SC      Left 9 22 cm  -SC      Measurement Location 10 wrist  -SC      Left 10 18.5 cm  -SC      Measurement Location 11 midpalm  -SC      Left 11 20.2 cm  -SC      Measurement Location 12 MCPs  -SC      Left 12 18.7 cm  -SC      Measurement Location 13 fingers net total  -SC      Measurement Location 14 net total L UE  -SC      Left 14 201.2 cm  -SC      Measurement Location 15 net decrease since initial evaluation  -SC      Left 15 -2.6 cm  -SC      Compression/Skin Care    Compression/Skin Care wrapping location;bandaging;compression garment;skin care  -SC       Skin Care washed/dried  -SC      Wrapping Location upper extremity  -SC      Wrapping Location UE left:;fingers to axilla  -SC      Wrapping Comments stockinette Tg6, transelast x 2, artiflex x 2, rosidal K 6, 8 x2, 10 (may require a 12 next session). Trial of herring bone from wrist to elbow with 8 cm x 2  -SC      Bandage Layers cotton liner;full limb;short-stretch bandages (comment size/quantity)  -SC      Bandaging Technique circumferential/spiral;figure-eight;light compression  -SC      Bandaging Comments removed foam, trial of herring bone  -SC        User Key  (r) = Recorded By, (t) = Taken By, (c) = Cosigned By    Initials Name Provider Type    MAIKOL Hutchins, PT Physical Therapist                              PT Assessment/Plan       07/07/17 1636       PT Assessment    Assessment Comments Noted increased or rebound edema with use of komprex II and rosidal soft foam. Did not use foam with compression bandaging today. Trial of herring bone at forearm.   -SC     PT Plan    PT Plan Comments assess full circumferential measurements.   -SC       User Key  (r) = Recorded By, (t) = Taken By, (c) = Cosigned By    Initials Name Provider Type    MAIKOL Hutchins, PT Physical Therapist                     Exercises       07/07/17 1550          Subjective Comments    Subjective Comments I am doing well. Went to a play last night so kept my arm propped during the show. The bandages don't bother me, just hot and an inconvenience. I am doing fine.   -SC      Subjective Pain    Able to rate subjective pain? yes  -SC      Pre-Treatment Pain Level 0  -SC      Post-Treatment Pain Level 0  -SC        User Key  (r) = Recorded By, (t) = Taken By, (c) = Cosigned By    Initials Name Provider Type    MAIKOL Hutchins, PT Physical Therapist                                  Therapy Education       07/07/17 1550          Therapy Education    Given HEP;Symptoms/condition management;Pain management;Edema  management;Bandaging/dressing change  -SC      Program Reinforced  -SC      How Provided Verbal  -SC      Provided to Patient  -SC      Level of Understanding Verbalized  -SC        User Key  (r) = Recorded By, (t) = Taken By, (c) = Cosigned By    Initials Name Provider Type    SC Danika Hutchins, PT Physical Therapist                Time Calculation:   Start Time: 1550  Stop Time: 1630  Time Calculation (min): 40 min     Therapy Charges for Today     Code Description Service Date Service Provider Modifiers Qty    [id number]  PT THER PROC EA 15 MIN 7/7/2017 Danika Hutchins, PT GP 3                    Danika Renee, PT  7/7/2017

## 2017-07-09 NOTE — PROGRESS NOTES
QUICK REFERENCE INFORMATION:  The ABCs of the Annual Wellness Visit    Subsequent Medicare Wellness Visit    HEALTH RISK ASSESSMENT    1950    Recent Hospitalizations:  No hospitalization(s) within the last year..        Current Medical Providers:  Patient Care Team:  Ernesto Hansen MD as PCP - General (Family Medicine)  Ivan Rosen MD as PCP - Claims Attributed  Morgan Valdez MD as Referring Physician (Breast Surgery)  Urbano Nettles MD as Consulting Physician (Hematology and Oncology)        Smoking Status:  History   Smoking Status   • Never Smoker   Smokeless Tobacco   • Not on file       Alcohol Consumption:  History   Alcohol Use   • Yes     Comment: Seldom       Depression Screen:   No flowsheet data found.    Health Habits and Functional and Cognitive Screening:  No flowsheet data found.           Does the patient have evidence of cognitive impairment? No    Aspirin use counseling: Start ASA 81 mg daily       Recent Lab Results:  CMP:  Lab Results   Component Value Date    GLU 89 12/14/2015    BUN 15 06/12/2017    CREATININE 0.78 06/12/2017    EGFRIFNONA 73 06/12/2017    EGFRIFAFRI 100 12/14/2015    BCR 19.2 06/12/2017     06/12/2017    K 4.7 06/12/2017    CO2 27.5 06/12/2017    CALCIUM 9.5 06/12/2017    PROTENTOTREF 6.8 12/14/2015    ALBUMIN 4.30 06/12/2017    LABGLOBREF 2.6 12/14/2015    LABIL2 1.4 06/12/2017    BILITOT 0.3 06/12/2017    ALKPHOS 84 06/12/2017    AST 22 06/12/2017    ALT 23 06/12/2017     Lipid Panel:  Lab Results   Component Value Date    TRIG 159 (H) 12/14/2015    HDL 55 12/14/2015    VLDL 32 12/14/2015     HbA1c:       Visual Acuity:  No exam data present    Age-appropriate Screening Schedule:  Refer to the list below for future screening recommendations based on patient's age, sex and/or medical conditions. Orders for these recommended tests are listed in the plan section. The patient has been provided with a written plan.    Health Maintenance   Topic Date  "Due   • INFLUENZA VACCINE  08/01/2017   • COLONOSCOPY  06/13/2027   • TDAP/TD VACCINES (2 - Td) 06/13/2027   • PNEUMOCOCCAL VACCINES (65+ LOW/MEDIUM RISK)  Excluded   • MAMMOGRAM  Excluded   • ZOSTER VACCINE  Excluded        Subjective   History of Present Illness    America Crews is a 66 y.o. female who presents for an Subsequent Wellness Visit.    The following portions of the patient's history were reviewed and updated as appropriate: allergies, current medications, past family history, past medical history, past social history, past surgical history and problem list.    Outpatient Medications Prior to Visit   Medication Sig Dispense Refill   • fluconazole (DIFLUCAN) 200 MG tablet TK 2 TS PO ONCE WEEKLY  0     No facility-administered medications prior to visit.        Patient Active Problem List   Diagnosis   • Breast cancer   • Lymphedema of upper extremity   • Medicare annual wellness visit, subsequent       Advance Care Planning:  power of  for healthcare on file, has an advance directive - a copy has been provided and is in file    Identification of Risk Factors:  Risk factors include: weight  and lymphedema.    Review of Systems    Compared to one year ago, the patient feels her physical health is the same.  Compared to one year ago, the patient feels her mental health is the same.    Objective     Physical Exam    Vitals:    06/13/17 1412   BP: Comment: [place] both  arms   Pulse: 77   Temp: 98 °F (36.7 °C)   SpO2: 97%   Weight: 189 lb 6.4 oz (85.9 kg)   Height: 61\" (154.9 cm)       Body mass index is 35.79 kg/(m^2).  Discussed the patient's BMI with her. The BMI is above average; BMI management plan is completed.    Assessment/Plan   Patient Self-Management and Personalized Health Advice  The patient has been provided with information about: diet, exercise, weight management, alcohol use and physical therapy and preventive services including:   · Exercise counseling provided, Fall Risk " assessment done, TdaP vaccine.    Visit Diagnoses:    ICD-10-CM ICD-9-CM   1. Medicare annual wellness visit, subsequent Z00.00 V70.0   2. Lymphedema of upper extremity I89.0 457.1       Orders Placed This Encounter   Procedures   • Tdap Vaccine Greater Than or Equal To 8yo IM     Scheduling Instructions:      Buttock   • Hepatitis C Antibody   • Ambulatory Referral to Physical Therapy Lymphedema     Referral Priority:   Routine     Referral Type:   Therapy     Referral Reason:   Specialty Services Required     Requested Specialty:   Physical Therapy     Number of Visits Requested:   1       Outpatient Encounter Prescriptions as of 6/13/2017   Medication Sig Dispense Refill   • fluconazole (DIFLUCAN) 200 MG tablet TK 2 TS PO ONCE WEEKLY  0     No facility-administered encounter medications on file as of 6/13/2017.        Reviewed use of high risk medication in the elderly: not applicable  Reviewed for potential of harmful drug interactions in the elderly: not applicable    Follow Up:  Chronic problems and preventatively    An After Visit Summary and PPPS with all of these plans were given to the patient.

## 2017-07-10 ENCOUNTER — HOSPITAL ENCOUNTER (OUTPATIENT)
Dept: PHYSICAL THERAPY | Facility: HOSPITAL | Age: 67
Setting detail: THERAPIES SERIES
Discharge: HOME | End: 2017-07-10

## 2017-07-10 DIAGNOSIS — I97.2 POSTMASTECTOMY LYMPHEDEMA SYNDROME: Primary | ICD-10-CM

## 2017-07-10 PROCEDURE — 97110 THERAPEUTIC EXERCISES: CPT | Performed by: PHYSICAL THERAPIST

## 2017-07-10 NOTE — THERAPY TREATMENT NOTE
Outpatient Physical Therapy Lymphedema Treatment Note  [facility]     Patient Name: America Crews  : 1950  MRN: [id number]  Today's Date: 7/10/2017        Visit Date: 07/10/2017    Visit Dx:    ICD-10-CM ICD-9-CM   1. 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 It didn't bother me at all.   -SC      Subjective Pain    Able to rate subjective pain? yes  -SC      Pre-Treatment Pain Level 0  -SC      Post-Treatment Pain Level 0  -SC      LUE Circumferential (cm)    Measurement Location 1 axilla  -SC      Left 1 33 cm  -SC      Measurement Location 2 15 cm above elbow  -SC      Left 2 31.5 cm  -SC      Measurement Location 3 10 cm above elbow  -SC      Left 3 31 cm  -SC      Measurement Location 4 5 cm above elbow  -SC      Left 4 32 cm  -SC      Measurement Location 5 elbow  -SC      Left 5 26.9 cm  -SC      Measurement Location 6 5 cm below elbow  -SC      Left 6 29.7 cm  -SC      Measurement Location 7 10 cm below elbow  -SC      Left 7 27.7 cm  -SC      Measurement Location 8 15 cm below elbow  -SC      Left 8 24.2 cm  -SC      Measurement Location 9 20 cm below elbow  -SC      Left 9 21 cm  -SC      Measurement Location 10 wrist  -SC      Left 10 16.6 cm  -SC      Measurement Location 11 midpalm  -SC      Left 11 18.6 cm  -SC      Measurement Location 12 MCPs  -SC      Left 12 18.2 cm  -SC      Measurement Location 13 fingers net total  -SC      Measurement Location 14 net total L UE  -SC      Left 14 310.4 cm  -SC      Measurement Location 15 net decrease since initial evaluation  -SC      Left 15 -17.9 cm  -SC      Compression/Skin Care    Compression/Skin Care wrapping  location;bandaging;compression garment;skin care  -SC      Skin Care washed/dried  -SC      Wrapping Location upper extremity  -SC      Wrapping Location UE left:;fingers to axilla  -SC      Wrapping Comments stockinette Tg6, grey foam dorsal hand, transelast x 2, artiflex x 2, rosidal K 6, 8 x2, 10 (may require a 12 next session). Trial of herring bone from wrist to elbow with 8 cm x 2  -SC      Bandage Layers cotton liner;full limb;short-stretch bandages (comment size/quantity)  -SC      Bandaging Technique circumferential/spiral;figure-eight;light compression  -SC      Bandaging Comments trial of grey foam dorsal hand to distal forearm, herring bone forearm to distal arm with decreased compression at elbow  -SC        User Key  (r) = Recorded By, (t) = Taken By, (c) = Cosigned By    Initials Name Provider Type    MAIKOL Hutchins PT Physical Therapist                              PT Assessment/Plan       07/10/17 5055       PT Assessment    Assessment Comments noted persisting edema left dorsal hand at MCPs and medially along MCs. Trial of grey foam to region. Continued Herring bone techniques with rosidal K.   -SC     PT Plan    PT Plan Comments assess grey foam.   -SC       User Key  (r) = Recorded By, (t) = Taken By, (c) = Cosigned By    Initials Name Provider Type    MAIKOL Hutchins PT Physical Therapist                     Exercises       07/10/17 8706          Subjective Comments    Subjective Comments I made it all weekend with my compression on and I did fine. It didn't bother me at all.   -SC      Subjective Pain    Able to rate subjective pain? yes  -SC      Pre-Treatment Pain Level 0  -SC      Post-Treatment Pain Level 0  -SC        User Key  (r) = Recorded By, (t) = Taken By, (c) = Cosigned By    Initials Name Provider Type    MAIKOL Hutchins PT Physical Therapist                              PT OP Goals       07/10/17 2391       PT Short Term Goals    STG Date to Achieve 07/05/17   -SC     STG 1 Patient independent and compliant with initial home exercise program focused on diaphragmatic breathing, range of motion, flexibility to decrease edema and improve lymphatic flow for decreased edema and decreased risk of infection.   -SC     STG 1 Progress Met  -SC     STG 2 Patient demonstrate proper awareness of What is Lymphedema and 18 Steps of Prevention for improved prevention, management, care of symptoms and ease of transition to self-care of condition.   -SC     STG 2 Progress Met  -SC     STG 3 Patient independent and compliant with self-wrapping techniques of compression bandages with spouse/family member as indicated for improved self-management of condition.   -SC     STG 3 Progress Progressing  -SC     STG 4 Patient demonstrate decreased net edema of left upper extremity >/= 15 cm for decrease in edema, symptoms, decreased risk of infection and improved skin care/transition to self-care of condition.   -SC     STG 4 Progress Met  -SC     STG 4 Progress Comments currently -17.9 cm  -SC     Long Term Goals    LTG Date to Achieve 07/19/17  -SC     LTG 1 Patient independent and compliant with advanced Home Exercise Program for self-management of condition.   -SC     LTG 1 Progress Progressing  -SC     LTG 2 Patient independent and compliant with compression garments as indicated for self-management of condition.   -SC     LTG 2 Progress Ongoing  -SC     LTG 3 Patient independent and compliant with home pneumatic compression pump for transition to self-care of condition.  -SC     LTG 3 Progress Ongoing  -SC     LTG 4 Patient demonstrate decreased net edema of left upper extremity >/= 30 cm for decrease in edema, symptoms, decreased risk of infection and improved skin care/transition to self-care of condition.   -SC     LTG 4 Progress Progressing  -SC     Time Calculation    PT Goal Re-Cert Due Date 07/19/17  -SC       User Key  (r) = Recorded By, (t) = Taken By, (c) = Cosigned By    Initials  Name Provider Type    SC Danika Hutchins, BENJA Physical Therapist                Therapy Education       07/10/17 1545          Therapy Education    Given HEP;Symptoms/condition management;Edema management;Bandaging/dressing change  -SC      Program Reinforced  -SC      How Provided Verbal  -SC      Provided to Patient  -SC      Level of Understanding Verbalized  -SC        User Key  (r) = Recorded By, (t) = Taken By, (c) = Cosigned By    Initials Name Provider Type    SC Danika Hutchins PT Physical Therapist                Time Calculation:   Start Time: 1545  Stop Time: 1630  Time Calculation (min): 45 min     Therapy Charges for Today     Code Description Service Date Service Provider Modifiers Qty    39636614608 HC PT THER PROC EA 15 MIN 7/10/2017 Danika Hutchins, PT GP 3                    Danika Renee PT  7/10/2017

## 2017-07-11 ENCOUNTER — HOSPITAL ENCOUNTER (OUTPATIENT)
Dept: PHYSICAL THERAPY | Facility: HOSPITAL | Age: 67
Setting detail: THERAPIES SERIES
Discharge: HOME | End: 2017-07-11

## 2017-07-11 DIAGNOSIS — I97.2 POSTMASTECTOMY LYMPHEDEMA SYNDROME: Primary | ICD-10-CM

## 2017-07-11 PROCEDURE — 97110 THERAPEUTIC EXERCISES: CPT | Performed by: PHYSICAL THERAPIST

## 2017-07-11 NOTE — THERAPY TREATMENT NOTE
Outpatient Physical Therapy Lymphedema Treatment Note  [facility]     Patient Name: America Crews  : 1950  MRN: [id number]  Today's Date: 2017        Visit Date: 2017    Visit Dx:    ICD-10-CM ICD-9-CM   1. 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   -SC I made it all weekend with my compression on and I did fine. It didn't bother me at all.   -SC     Subjective Pain    Able to rate subjective pain? yes  -SC yes  -SC     Pre-Treatment Pain Level 0  -SC 0  -SC     Post-Treatment Pain Level 0  -SC 0  -SC     LUE Circumferential (cm)    Measurement Location 1  axilla  -SC     Left 1  33 cm  -SC     Measurement Location 2  15 cm above elbow  -SC     Left 2  31.5 cm  -SC     Measurement Location 3  10 cm above elbow  -SC     Left 3  31 cm  -SC     Measurement Location 4  5 cm above elbow  -SC     Left 4  32 cm  -SC     Measurement Location 5  elbow  -SC     Left 5  26.9 cm  -SC     Measurement Location 6  5 cm below elbow  -SC     Left 6  29.7 cm  -SC     Measurement Location 7  10 cm below elbow  -SC     Left 7  27.7 cm  -SC     Measurement Location 8  15 cm below elbow  -SC     Left 8  24.2 cm  -SC     Measurement Location 9  20 cm below elbow  -SC     Left 9  21 cm  -SC     Measurement Location 10  wrist  -SC     Left 10  16.6 cm  -SC     Measurement Location 11  midpalm  -SC     Left 11  18.6 cm  -SC     Measurement Location 12  MCPs  -SC     Left 12  18.2 cm  -SC     Measurement Location 13  fingers net total  -SC     Measurement Location 14  net total L UE  -SC     Left 14  310.4 cm  -SC     Measurement Location 15  net decrease since initial evaluation  -SC     Left 15  -17.9 cm  -SC     Compression/Skin Care     Compression/Skin Care wrapping location;bandaging;compression garment;skin care  -SC wrapping location;bandaging;compression garment;skin care  -SC     Skin Care washed/dried  -SC washed/dried  -SC     Wrapping Location upper extremity  -SC upper extremity  -SC     Wrapping Location UE left:;fingers to axilla  -SC left:;fingers to axilla  -SC     Wrapping Comments stockinette Tg6, kinesiotape fan strip 5 fingers from lateral epicondyle dorsal forearm to dorsal hand to fingers, grey foam dorsal hand and volar forearm, transelast x 2, artiflex x 2, rosidal K 6, 8 x2, 10 (may require a 12 next session). Herring bone from wrist to elbow with 8 cm x 2  -SC stockinette Tg6, grey foam dorsal hand, transelast x 2, artiflex x 2, rosidal K 6, 8 x2, 10 (may require a 12 next session). Trial of herring bone from wrist to elbow with 8 cm x 2  -SC     Bandage Layers cotton liner;full limb;short-stretch bandages (comment size/quantity)  -SC cotton liner;full limb;short-stretch bandages (comment size/quantity)  -SC     Bandaging Technique circumferential/spiral;figure-eight;light compression  -SC circumferential/spiral;figure-eight;light compression  -SC     Bandaging Comments trial of kinesiotape, trial of grey foam to volar forearm  -SC trial of grey foam dorsal hand to distal forearm, herring bone forearm to distal arm with decreased compression at elbow  -SC       User Key  (r) = Recorded By, (t) = Taken By, (c) = Cosigned By    Initials Name Provider Type    SC Danika Hutchins, PT Physical Therapist                              PT Assessment/Plan       07/11/17 0800 07/10/17 7060    PT Assessment    Assessment Comments good response to grey foam. Trial of kinesiotape dorsal forearm to dorsal hand due to persisting edema MCPs. Trial of grey foam volar forearm due to good response dorsal hand.   -SC noted persisting edema left dorsal hand at MCPs and medially along MCs. Trial of grey foam to region. Continued Herring bone  techniques with rosidal K.   -SC    PT Plan    PT Plan Comments assess grey foam, kinesiotape and circumferential measurements.   -SC assess grey foam.   -SC      User Key  (r) = Recorded By, (t) = Taken By, (c) = Cosigned By    Initials Name Provider Type    MAIKOL Hutchins, PT Physical Therapist                     Exercises       07/11/17 0800 07/10/17 1545       Subjective Comments    Subjective Comments I did fine over night.   -SC I made it all weekend with my compression on and I did fine. It didn't bother me at all.   -SC     Subjective Pain    Able to rate subjective pain? yes  -SC yes  -SC     Pre-Treatment Pain Level 0  -SC 0  -SC     Post-Treatment Pain Level 0  -SC 0  -SC       User Key  (r) = Recorded By, (t) = Taken By, (c) = Cosigned By    Initials Name Provider Type    SC Danika Hutchins, PT Physical Therapist                              PT OP Goals       07/10/17 1545       PT Short Term Goals    STG Date to Achieve 07/05/17  -SC     STG 1 Patient independent and compliant with initial home exercise program focused on diaphragmatic breathing, range of motion, flexibility to decrease edema and improve lymphatic flow for decreased edema and decreased risk of infection.   -SC     STG 1 Progress Met  -SC     STG 2 Patient demonstrate proper awareness of What is Lymphedema and 18 Steps of Prevention for improved prevention, management, care of symptoms and ease of transition to self-care of condition.   -SC     STG 2 Progress Met  -SC     STG 3 Patient independent and compliant with self-wrapping techniques of compression bandages with spouse/family member as indicated for improved self-management of condition.   -SC     STG 3 Progress Progressing  -SC     STG 4 Patient demonstrate decreased net edema of left upper extremity >/= 15 cm for decrease in edema, symptoms, decreased risk of infection and improved skin care/transition to self-care of condition.   -SC     STG 4 Progress Met  -SC      STG 4 Progress Comments currently -17.9 cm  -SC     Long Term Goals    LTG Date to Achieve 07/19/17  -SC     LTG 1 Patient independent and compliant with advanced Home Exercise Program for self-management of condition.   -SC     LTG 1 Progress Progressing  -SC     LTG 2 Patient independent and compliant with compression garments as indicated for self-management of condition.   -SC     LTG 2 Progress Ongoing  -SC     LTG 3 Patient independent and compliant with home pneumatic compression pump for transition to self-care of condition.  -SC     LTG 3 Progress Ongoing  -SC     LTG 4 Patient demonstrate decreased net edema of left upper extremity >/= 30 cm for decrease in edema, symptoms, decreased risk of infection and improved skin care/transition to self-care of condition.   -SC     LTG 4 Progress Progressing  -SC     Time Calculation    PT Goal Re-Cert Due Date 07/19/17  -SC       User Key  (r) = Recorded By, (t) = Taken By, (c) = Cosigned By    Initials Name Provider Type    SC Danika Hutchins PT Physical Therapist                Therapy Education       07/11/17 0800 07/10/17 1548       Therapy Education    Given HEP;Symptoms/condition management;Edema management;Bandaging/dressing change  -SC HEP;Symptoms/condition management;Edema management;Bandaging/dressing change  -SC     Program Modified  -SC Reinforced  -SC     How Provided Verbal;Demonstration  -SC Verbal  -SC     Provided to Patient  -SC Patient  -SC     Level of Understanding Verbalized  -SC Verbalized  -SC       User Key  (r) = Recorded By, (t) = Taken By, (c) = Cosigned By    Initials Name Provider Type    MAIKOL Hutchins PT Physical Therapist                Time Calculation:   Start Time: 0800  Stop Time: 0845  Time Calculation (min): 45 min     Therapy Charges for Today     Code Description Service Date Service Provider Modifiers Qty    [id number]  PT THER PROC EA 15 MIN 7/11/2017 Danika Hutchins PT GP 3                    Danika HANEY  Thelma, PT  7/11/2017

## 2017-07-14 ENCOUNTER — HOSPITAL ENCOUNTER (OUTPATIENT)
Dept: PHYSICAL THERAPY | Facility: HOSPITAL | Age: 67
Setting detail: THERAPIES SERIES
Discharge: HOME | End: 2017-07-14

## 2017-07-14 DIAGNOSIS — I97.2 POSTMASTECTOMY LYMPHEDEMA SYNDROME: Primary | ICD-10-CM

## 2017-07-14 PROCEDURE — 97110 THERAPEUTIC EXERCISES: CPT | Performed by: PHYSICAL THERAPIST

## 2017-07-14 NOTE — THERAPY TREATMENT NOTE
Outpatient Physical Therapy Lymphedema Treatment Note  [facility]     Patient Name: America Crews  : 1950  MRN: [id number]  Today's Date: 2017        Visit Date: 2017    Visit Dx:    ICD-10-CM ICD-9-CM   1. 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 The finger wraps started to slide. But I had no issues. Wanting to get back into my sleeve thoughl  -SC      Subjective Pain    Able to rate subjective pain? yes  -SC      Pre-Treatment Pain Level 0  -SC      Post-Treatment Pain Level 0  -SC      Compression/Skin Care    Compression/Skin Care wrapping location;bandaging;compression garment;skin care  -SC      Skin Care washed/dried  -SC      Wrapping Location upper extremity  -SC      Wrapping Location UE left:;fingers to axilla  -SC      Wrapping Comments stockinette Tg6, kinesiotape fan strip 5 fingers x2 from medial and lateral epicondyle dorsal forearm to dorsal hand to fingers, grey foam dorsal hand and volar forearm, transelast x 2, artiflex x 2, rosidal K 6, 8 x2, 10 (may require a 12 next session). Herring bone from wrist to elbow with 8 cm x 2  -SC      Bandage Layers cotton liner;full limb;short-stretch bandages (comment size/quantity)  -SC      Bandaging Technique circumferential/spiral;figure-eight;light compression  -SC      Bandaging Comments kinesiotape volar and dorsal forearm to fingers with fan strip 5 fingers each under bandaging  -SC        User Key  (r) = Recorded By, (t) = Taken By, (c) = Cosigned By    Initials Name Provider Type    SC Danika Hutchins, PT Physical Therapist                              PT Assessment/Plan       17 09       PT Assessment    Assessment Comments Good response to foam and  kinesiotape however persisting edema MCPs dorsal left hand. However marked decrease of fibrosis left volar forearm. Transition into daytime RTW sleeve and glove next week pending progress.   -SC     PT Plan    PT Plan Comments assess circumferential measurements, transition into sleeve and glove.   -SC       User Key  (r) = Recorded By, (t) = Taken By, (c) = Cosigned By    Initials Name Provider Type    SC Danika Hutchins PT Physical Therapist                     Exercises       07/14/17 0900          Subjective Comments    Subjective Comments I am doing fine. The finger wraps started to slide. But I had no issues. Wanting to get back into my sleeve thoughl  -SC      Subjective Pain    Able to rate subjective pain? yes  -SC      Pre-Treatment Pain Level 0  -SC      Post-Treatment Pain Level 0  -SC        User Key  (r) = Recorded By, (t) = Taken By, (c) = Cosigned By    Initials Name Provider Type    SC Danika Hutchins PT Physical Therapist                                  Therapy Education       07/14/17 0900          Therapy Education    Given HEP;Symptoms/condition management;Edema management;Bandaging/dressing change;Other (comment)   transition to compression sleeve  -SC      Program New  -SC      How Provided Verbal;Demonstration  -SC      Provided to Patient  -SC      Level of Understanding Teach back education performed;Verbalized;Demonstrated  -SC        User Key  (r) = Recorded By, (t) = Taken By, (c) = Cosigned By    Initials Name Provider Type    SC Danika Hutchins PT Physical Therapist                Time Calculation:   Start Time: 0900  Stop Time: 0955  Time Calculation (min): 55 min     Therapy Charges for Today     Code Description Service Date Service Provider Modifiers Qty    [id number]  PT THER PROC EA 15 MIN 7/14/2017 Danika Hutchins, PT GP 4                    Danika Renee PT  7/14/2017

## 2017-07-17 ENCOUNTER — HOSPITAL ENCOUNTER (OUTPATIENT)
Dept: PHYSICAL THERAPY | Facility: HOSPITAL | Age: 67
Setting detail: THERAPIES SERIES
Discharge: HOME | End: 2017-07-17

## 2017-07-17 DIAGNOSIS — I97.2 POSTMASTECTOMY LYMPHEDEMA SYNDROME: Primary | ICD-10-CM

## 2017-07-17 PROCEDURE — 97110 THERAPEUTIC EXERCISES: CPT | Performed by: PHYSICAL THERAPIST

## 2017-07-17 NOTE — THERAPY TREATMENT NOTE
Outpatient Physical Therapy Lymphedema Treatment Note  [facility]     Patient Name: America Crews  : 1950  MRN: [id number]  Today's Date: 2017        Visit Date: 2017    Visit Dx:    ICD-10-CM ICD-9-CM   1. 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 I took the bandages off yesterday afternoon. Took a shower. Let it air out a bit. My hand really did look smaller. I think the kinesiotape helped! I wore my custom garments in so you could see the brand so we could figure out with the order of my next garments. I still need to have my pump looked at.   -SC      Subjective Pain    Able to rate subjective pain? yes  -SC      Pre-Treatment Pain Level 0  -SC      Post-Treatment Pain Level 0  -SC      LUE Circumferential (cm)    Measurement Location 1 axilla  -SC      Left 1 32.9 cm  -SC      Measurement Location 2 15 cm above elbow  -SC      Left 2 31.4 cm  -SC      Measurement Location 3 10 cm above elbow  -SC      Measurement Location 4 5 cm above elbow  -SC      Left 4 32 cm  -SC      Measurement Location 5 elbow  -SC      Measurement Location 6 5 cm below elbow  -SC      Left 6 30 cm  -SC      Measurement Location 7 10 cm below elbow  -SC      Measurement Location 8 15 cm below elbow  -SC      Left 8 24 cm  -SC      Measurement Location 9 20 cm below elbow  -SC      Measurement Location 10 wrist  -SC      Left 10 18.4 cm  -SC      Measurement Location 11 midpalm  -SC      Left 11 19.4 cm  -SC      Measurement Location 12 MCPs  -SC      Left 12 17.5 cm  -SC      Measurement Location 13 fingers net total  -SC      Measurement Location 14 net total L UE  -SC      Measurement Location 15 net decrease since initial evaluation  -SC       Compression/Skin Care    Compression/Skin Care wrapping location;bandaging;compression garment;skin care  -SC      Wrapping Location upper extremity  -SC      Wrapping Location UE left:;fingers to axilla  -SC      Wrapping Comments janessae Tg6, kinesiotape fan strip 5 fingers x2 from medial and lateral epicondyle dorsal forearm to dorsal hand to fingers, grey foam dorsal hand and volar forearm, kinesiotape x 1 fan strip 5 fingers from dorsal proximal arm to posterior inferior just superior to elbow, transelast x 2, artiflex x 2, rosidal K 6, 8 x2, 10. Herring bone from wrist to elbow with 8 cm x 2  -SC      Bandage Layers cotton liner;full limb;short-stretch bandages (comment size/quantity)  -SC      Bandaging Technique circumferential/spiral;figure-eight;light compression  -SC      Compression Garment Comments presents with custom compression garments donned however worn out, from 2015  -SC      Bandaging Comments kinesiotape volar and dorsal forearm to fingers with fan strip 5 fingers each under bandaging, progressed with kinesiotape fan strip 5 fingers dorsal arm today  -SC        User Key  (r) = Recorded By, (t) = Taken By, (c) = Cosigned By    Initials Name Provider Type    MAIKOL Hutchins, PT Physical Therapist                              PT Assessment/Plan       07/17/17 1335       PT Assessment    Assessment Comments excellent responds from kinesiotape, possible fitting OTS garments next session pending progress. Recert due next treatment session.   -SC     PT Plan    PT Plan Comments possible fitting OTS compression sleeve and glove. Recert next session.   -SC       User Key  (r) = Recorded By, (t) = Taken By, (c) = Cosigned By    Initials Name Provider Type    MAIKOL Hutchins, PT Physical Therapist                     Exercises       07/17/17 1335          Subjective Comments    Subjective Comments I did well. I took the bandages off yesterday afternoon. Took a shower. Let it air out a  bit. My hand really did look smaller. I think the kinesiotape helped! I wore my custom garments in so you could see the brand so we could figure out with the order of my next garments. I still need to have my pump looked at.   -SC      Subjective Pain    Able to rate subjective pain? yes  -SC      Pre-Treatment Pain Level 0  -SC      Post-Treatment Pain Level 0  -SC        User Key  (r) = Recorded By, (t) = Taken By, (c) = Cosigned By    Initials Name Provider Type    SC Danika Hutchins, PT Physical Therapist                              PT OP Goals       07/17/17 1335       PT Short Term Goals    STG Date to Achieve 07/05/17  -SC     STG 1 Patient independent and compliant with initial home exercise program focused on diaphragmatic breathing, range of motion, flexibility to decrease edema and improve lymphatic flow for decreased edema and decreased risk of infection.   -SC     STG 1 Progress Met  -SC     STG 2 Patient demonstrate proper awareness of What is Lymphedema and 18 Steps of Prevention for improved prevention, management, care of symptoms and ease of transition to self-care of condition.   -SC     STG 2 Progress Met  -SC     STG 3 Patient independent and compliant with self-wrapping techniques of compression bandages with spouse/family member as indicated for improved self-management of condition.   -SC     STG 3 Progress Goal Revised   compliant but unable to wrap at home due to lives alone  -SC     STG 4 Patient demonstrate decreased net edema of left upper extremity >/= 15 cm for decrease in edema, symptoms, decreased risk of infection and improved skin care/transition to self-care of condition.   -SC     STG 4 Progress Met  -SC     Long Term Goals    LTG Date to Achieve 07/19/17  -SC     LTG 1 Patient independent and compliant with advanced Home Exercise Program for self-management of condition.   -SC     LTG 1 Progress Progressing  -SC     LTG 2 Patient independent and compliant with compression  garments as indicated for self-management of condition.   -SC     LTG 2 Progress Ongoing  -SC     LTG 2 Progress Comments possible fitting next visit pending progress  -SC     LTG 3 Patient independent and compliant with home pneumatic compression pump for transition to self-care of condition.  -SC     LTG 3 Progress Ongoing  -SC     LTG 3 Progress Comments to have Tactile assess current pump at home to allow for most effective use of garment  -SC     LTG 4 Patient demonstrate decreased net edema of left upper extremity >/= 30 cm for decrease in edema, symptoms, decreased risk of infection and improved skin care/transition to self-care of condition.   -SC     LTG 4 Progress Progressing  -SC     LTG 4 Progress Comments currently around -17.0 cm  -SC     Time Calculation    PT Goal Re-Cert Due Date 07/19/17  -SC       User Key  (r) = Recorded By, (t) = Taken By, (c) = Cosigned By    Initials Name Provider Type    MAIKOL Hutchins PT Physical Therapist                Therapy Education       07/17/17 1335          Therapy Education    Given Symptoms/condition management;Edema management;Bandaging/dressing change  -SC      Program Modified  -SC      How Provided Verbal;Demonstration  -SC      Provided to Patient  -SC      Level of Understanding Teach back education performed;Verbalized;Demonstrated  -SC        User Key  (r) = Recorded By, (t) = Taken By, (c) = Cosigned By    Initials Name Provider Type    MAIKOL Hutchins PT Physical Therapist                Time Calculation:   Start Time: 1335  Stop Time: 1420  Time Calculation (min): 45 min     Therapy Charges for Today     Code Description Service Date Service Provider Modifiers Qty    90460181331  PT THER PROC EA 15 MIN 7/17/2017 Danika Hutchins PT GP 3                    Danika Renee PT  7/17/2017

## 2017-07-20 ENCOUNTER — HOSPITAL ENCOUNTER (OUTPATIENT)
Dept: PHYSICAL THERAPY | Facility: HOSPITAL | Age: 67
Setting detail: THERAPIES SERIES
Discharge: HOME | End: 2017-07-20

## 2017-07-20 DIAGNOSIS — I97.2 POSTMASTECTOMY LYMPHEDEMA SYNDROME: Primary | ICD-10-CM

## 2017-07-20 PROCEDURE — 97110 THERAPEUTIC EXERCISES: CPT | Performed by: PHYSICAL THERAPIST

## 2017-07-20 NOTE — THERAPY TREATMENT NOTE
Outpatient Physical Therapy Lymphedema Treatment Note  [facility]     Patient Name: America Crews  : 1950  MRN: [id number]  Today's Date: 2017        Visit Date: 2017    Visit Dx:    ICD-10-CM ICD-9-CM   1. 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 Feels less swollen today. I leave for my trip in 2 weeks, would love to be in compression by then.   -SC      Subjective Pain    Able to rate subjective pain? yes  -SC      Pre-Treatment Pain Level 0  -SC      Post-Treatment Pain Level 0  -SC      LUE Circumferential (cm)    Measurement Location 1 axilla  -SC      Left 1 32 cm  -SC      Measurement Location 2 15 cm above elbow  -SC      Left 2 31 cm  -SC      Measurement Location 3 10 cm above elbow  -SC      Measurement Location 4 5 cm above elbow  -SC      Left 4 31.5 cm  -SC      Measurement Location 5 elbow  -SC      Measurement Location 6 5 cm below elbow  -SC      Left 6 28.8 cm  -SC      Measurement Location 7 10 cm below elbow  -SC      Measurement Location 8 15 cm below elbow  -SC      Left 8 24 cm  -SC      Measurement Location 9 20 cm below elbow  -SC      Measurement Location 10 wrist  -SC      Left 10 17.1 cm  -SC      Measurement Location 11 midpalm  -SC      Left 11 18 cm  -SC      Measurement Location 12 MCPs  -SC      Left 12 16.8 cm  -SC      Measurement Location 13 fingers net total  -SC      Measurement Location 14 net total L UE  -SC      Measurement Location 15 net decrease since initial evaluation  -SC      Compression/Skin Care    Compression/Skin Care wrapping location;bandaging;compression garment;skin care  -SC      Skin Care washed/dried  -SC      Wrapping Location upper extremity  -SC       Wrapping Location UE left:;fingers to axilla  -SC      Wrapping Comments stockinette Tg6, kinesiotape fan strip 5 fingers x2 from medial and lateral epicondyle dorsal forearm to dorsal hand to fingers, grey foam dorsal hand and volar forearm, kinesiotape x 1 fan strip 5 fingers from dorsal proximal arm to posterior inferior just superior to elbow, transelast x 2, artiflex x 2, rosidal K 6, 8 x2, 10. Herring bone from wrist to elbow with 8 cm x 2  -SC      Bandage Layers cotton liner;full limb;short-stretch bandages (comment size/quantity)  -SC      Bandaging Technique circumferential/spiral;figure-eight;light compression  -SC      Bandaging Comments kinesiotape volar and dorsal forearm to fingers with fan strip 5 fingers each under bandaging, kinesiotape fan strip 5 fingers dorsal arm today  -SC        User Key  (r) = Recorded By, (t) = Taken By, (c) = Cosigned By    Initials Name Provider Type    MAIKOL Hutchins, PT Physical Therapist                              PT Assessment/Plan       07/20/17 1330       PT Assessment    Assessment Comments fit for compression, recert tomorrow.   -SC     PT Plan    PT Plan Comments fit for compression, recert tomorrow  -SC       User Key  (r) = Recorded By, (t) = Taken By, (c) = Cosigned By    Initials Name Provider Type    MAIKOL Hutchins, PT Physical Therapist                     Exercises       07/20/17 1330          Subjective Comments    Subjective Comments I feel like I am doing well. Feels less swollen today. I leave for my trip in 2 weeks, would love to be in compression by then.   -SC      Subjective Pain    Able to rate subjective pain? yes  -SC      Pre-Treatment Pain Level 0  -SC      Post-Treatment Pain Level 0  -SC        User Key  (r) = Recorded By, (t) = Taken By, (c) = Cosigned By    Initials Name Provider Type    MAIKOL Hutchins, PT Physical Therapist                                  Therapy Education       07/20/17 1330          Therapy  Education    Given Symptoms/condition management;Edema management;Bandaging/dressing change;Other (comment)   compression garments  -SC      Program Modified  -SC      How Provided Verbal;Demonstration  -SC      Provided to Patient  -SC      Level of Understanding Teach back education performed;Verbalized;Demonstrated  -SC        User Key  (r) = Recorded By, (t) = Taken By, (c) = Cosigned By    Initials Name Provider Type    SC Danika Hutchins, PT Physical Therapist                Time Calculation:   Start Time: 1330  Stop Time: 1425  Time Calculation (min): 55 min     Therapy Charges for Today     Code Description Service Date Service Provider Modifiers Qty    12317061505  PT THER PROC EA 15 MIN 7/20/2017 Danika Hutchins, PT GP 4                    Danika Renee, PT  7/20/2017

## 2017-07-21 ENCOUNTER — HOSPITAL ENCOUNTER (OUTPATIENT)
Dept: PHYSICAL THERAPY | Facility: HOSPITAL | Age: 67
Setting detail: THERAPIES SERIES
Discharge: HOME | End: 2017-07-21

## 2017-07-21 DIAGNOSIS — I97.2 POSTMASTECTOMY LYMPHEDEMA SYNDROME: Primary | ICD-10-CM

## 2017-07-21 PROCEDURE — 97110 THERAPEUTIC EXERCISES: CPT | Performed by: PHYSICAL THERAPIST

## 2017-07-21 PROCEDURE — G8985 CARRY GOAL STATUS: HCPCS | Performed by: PHYSICAL THERAPIST

## 2017-07-21 PROCEDURE — G8984 CARRY CURRENT STATUS: HCPCS | Performed by: PHYSICAL THERAPIST

## 2017-07-21 NOTE — THERAPY RE-EVALUATION
Physical Therapy Lymphedema Re-Evaluation  [facility]     Patient Name: America Crews  : 1950  MRN: [id number]  Today's Date: 2017      Visit Date: 2017    Visit Dx:    ICD-10-CM ICD-9-CM   1. 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 I like the tape and I feel like we are making progress. I didn't always feel that way with therapy before. I am excited to get into garments though, especially before I leave of my 10 day trip.   -SC      Subjective Pain    Able to rate subjective pain? yes  -SC      Pre-Treatment Pain Level 0  -SC      Post-Treatment Pain Level 0  -SC      Lymphedema Edema Assessment    Ptting Edema Category By grade out of 4  -SC      Pitting Edema + 1/4 (+1 of 4);+ 2/4;Mild;Moderate  -SC      Edema Assessment Comment dorsal hand soft pitting, volar forearm firm fibrotic  -SC      Skin Changes/Observations    Skin Observations Comment negative skin irritation from kinesiotape  -SC      Lymphedema Measurements    Measurement Type(s) Circumferential  -SC      Circumferential Areas Upper extremities  -SC      LUE Circumferential (cm)    Measurement Location 1 axilla  -SC      Left 1 32.5 cm  -SC      Measurement Location 2 15 cm above elbow  -SC      Left 2 31 cm   -SC      Measurement Location 3 10 cm above elbow  -SC      Left 3 30.4 cm  -SC      Measurement Location 4 5 cm above elbow  -SC      Left 4 31 cm  -SC      Measurement Location 5 elbow  -SC      Left 5 26.5 cm  -SC      Measurement Location 6 5 cm below elbow  -SC      Left 6 29 cm  -SC      Measurement Location 7 10 cm below elbow  -SC      Left 7 27 cm  -SC      Measurement Location 8 15 cm below elbow  -SC      Left 8 25 cm  -SC      Measurement Location 9 20 cm below elbow  -SC      Left 9 21.9 cm  -SC      Measurement Location 10 wrist  -SC      Left 10 16.5 cm  -SC      Measurement Location 11 midpalm  -SC      Left 11 18.1 cm  -SC      Measurement Location 12 MCPs  -SC      Left 12 17 cm  -SC      Measurement Location 13 fingers net total  -SC      Left 13 68 cm  -SC      Measurement Location 14 net total L UE  -SC      Left 14 373.9 cm  -SC      Measurement Location 15 net decrease since initial evaluation  -SC      Left 15 -27.7 cm  -SC      RUE Circumferential (cm)    Measurement Location 1 axilla  -SC      Measurement Location 2 15 cm above elbow  -SC      Measurement Location 3 10 cm above elbow  -SC      Measurement Location 4 5 cm above elbow  -SC      Measurement Location 5 elbow  -SC      Measurement Location 6 5 cm below elbow  -SC      Measurement Location 7 10 cm below elbow  -SC      Measurement Location 8 15 cm below elbow  -SC      Measurement Location 9 20 cm below elbow  -SC      Measurement Location 10 wrist  -SC      Measurement Location 11 midpalm  -SC      Measurement Location 12 MCPs  -SC      Measurement Location 13 fingers net total  -SC      Measurement Location 14 net total L UE  -SC      Measurement Location 15 net decrease since initial evaluation  -SC      Compression/Skin Care    Compression/Skin Care wrapping location;bandaging;compression garment;skin care  -SC      Skin Care washed/dried  -SC      Wrapping Location upper extremity  -SC      Wrapping Location UE left:;fingers  to axilla  -SC      Wrapping Comments sahil Tg6, kinesiotape fan strip 5 fingers x2 from medial and lateral epicondyle dorsal forearm to dorsal hand to fingers, grey foam dorsal hand and volar forearm, kinesiotape x 1 fan strip 5 fingers from dorsal proximal arm to posterior inferior just superior to elbow, transelast x 2, artiflex x 2, rosidal K 6, 8 x2, 10. Herring bone from wrist to elbow with 8 cm x 2  -SC      Bandage Layers cotton liner;full limb;short-stretch bandages (comment size/quantity)  -SC      Bandaging Technique circumferential/spiral;figure-eight;light compression  -SC      Compression Garment Comments discussion of fitting new daytime RTW compression garments next treatment session  -SC      Bandaging Comments kinesiotape volar and dorsal forearm to fingers with fan strip 5 fingers each under bandaging, kinesiotape fan strip 5 fingers dorsal arm   -SC        User Key  (r) = Recorded By, (t) = Taken By, (c) = Cosigned By    Initials Name Provider Type    MAIKOL Hutchins, BENJA Physical Therapist                                Therapy Education       07/21/17 1400          Therapy Education    Given Symptoms/condition management;Edema management;Bandaging/dressing change;Other (comment)   compression, pump  -SC      Program Reinforced  -SC      How Provided Verbal  -SC      Provided to Patient  -SC      Level of Understanding Verbalized  -SC        User Key  (r) = Recorded By, (t) = Taken By, (c) = Cosigned By    Initials Name Provider Type    MAIKOL Hutchins PT Physical Therapist                  Exercises       07/21/17 1400          Subjective Comments    Subjective Comments I really think therapy is helping. I like the tape and I feel like we are making progress. I didn't always feel that way with therapy before. I am excited to get into garments though, especially before I leave of my 10 day trip.   -SC      Subjective Pain    Able to rate subjective pain? yes  -SC       Pre-Treatment Pain Level 0  -SC      Post-Treatment Pain Level 0  -SC        User Key  (r) = Recorded By, (t) = Taken By, (c) = Cosigned By    Initials Name Provider Type    MAIKOL Hutchins, PT Physical Therapist                              PT OP Goals       07/21/17 1400       PT Short Term Goals    STG 1 Patient independent and compliant with initial home exercise program focused on diaphragmatic breathing, range of motion, flexibility to decrease edema and improve lymphatic flow for decreased edema and decreased risk of infection.   -SC     STG 1 Progress Met  -SC     STG 2 Patient demonstrate proper awareness of What is Lymphedema and 18 Steps of Prevention for improved prevention, management, care of symptoms and ease of transition to self-care of condition.   -SC     STG 2 Progress Met  -SC     STG 3 Patient independent and compliant with self-wrapping techniques of compression bandages with spouse/family member as indicated for improved self-management of condition.   -SC     STG 3 Progress Goal Revised   compliant but unable to wrap at home due to lives alone  -SC     STG 4 Patient demonstrate decreased net edema of left upper extremity >/= 15 cm for decrease in edema, symptoms, decreased risk of infection and improved skin care/transition to self-care of condition.   -SC     STG 4 Progress Met  -SC     Long Term Goals    LTG Date to Achieve 08/18/17  -SC     LTG 1 Patient independent and compliant with advanced Home Exercise Program for self-management of condition.   -SC     LTG 1 Progress Progressing  -SC     LTG 1 Progress Comments nearing achievement  -SC     LTG 2 Patient independent and compliant with compression garments as indicated for self-management of condition.   -SC     LTG 2 Progress Ongoing  -SC     LTG 2 Progress Comments to be fitted next treatment session, goal transition to garments in 1-2 weeks pending fitting  -SC     LTG 3 Patient independent and compliant with home pneumatic  compression pump for transition to self-care of condition.  -SC     LTG 3 Progress Ongoing  -SC     LTG 3 Progress Comments to have tactile fitters assess next week when transitioned to daytime garments  -SC     LTG 4 Patient demonstrate decreased net edema of left upper extremity >/= 30 cm for decrease in edema, symptoms, decreased risk of infection and improved skin care/transition to self-care of condition.   -SC     LTG 4 Progress Progressing  -SC     LTG 4 Progress Comments currently -27.7 cm   -SC     Time Calculation    PT Goal Re-Cert Due Date 08/18/17  -SC       User Key  (r) = Recorded By, (t) = Taken By, (c) = Cosigned By    Initials Name Provider Type    MAIKOL Hutchins, PT Physical Therapist                PT Assessment/Plan       07/21/17 1400       PT Assessment    Assessment Comments Mrs. Crews has currently attended 10/10 formal therapy appointments since initial evaluation 06/21/17 for post mastectomy lymphedema syndrome left UE. At this time we are making good progress with compression bandaging in conjunction with kinesiotape to reduce edema. She is very compliant with treatment program. Mild persisting pitting soft edema dorsal hand, medially and at MCPs. Persisitng mild to moderate fibrotic edema volar wrist, but responds well to grey foam and kinesiotape. Goal to transition to compression garments new daytime and current night time in the next 1-2 weeks before patient goes on vacation for 10 days. Fit for new daytime RTW compression garments next treatment session.   -SC     PT Plan    PT Frequency Other (comment)  -SC     Predicted Duration of Therapy Intervention (days/wks) 2-3x/week 2 weeks then 1x/week 2-3 weeks  -SC     PT Plan Comments fit for compression  -SC       User Key  (r) = Recorded By, (t) = Taken By, (c) = Cosigned By    Initials Name Provider Type    MAIKOL Hutchins, PT Physical Therapist                 Time Calculation:   Start Time: 1400  Stop Time: 1445  Time  Calculation (min): 45 min     Therapy Charges for Today     Code Description Service Date Service Provider Modifiers Qty    91832908114 HC PT CARRY MOV HAND OBJ CURRENT 7/21/2017 Danika Hutchins, PT GP, CJ 1    48431833230 HC PT CARRY MOV HAND OBJ PROJECTED 7/21/2017 Danika Hutchins, PT GP, CI 1    [id number] HC PT THER PROC EA 15 MIN 7/21/2017 Danika Hutchins, PT GP 3          PT G-Codes  PT Professional Judgement Used?: Yes  Functional Limitation: Carrying, moving and handling objects  Carrying, Moving and Handling Objects Current Status (): At least 20 percent but less than 40 percent impaired, limited or restricted  Carrying, Moving and Handling Objects Goal Status (): At least 1 percent but less than 20 percent impaired, limited or restricted         Danika Renee, PT  7/21/2017

## 2017-07-24 ENCOUNTER — HOSPITAL ENCOUNTER (OUTPATIENT)
Dept: PHYSICAL THERAPY | Facility: HOSPITAL | Age: 67
Setting detail: THERAPIES SERIES
Discharge: HOME | End: 2017-07-24

## 2017-07-24 DIAGNOSIS — I97.2 POSTMASTECTOMY LYMPHEDEMA SYNDROME: Primary | ICD-10-CM

## 2017-07-24 PROCEDURE — 97110 THERAPEUTIC EXERCISES: CPT | Performed by: PHYSICAL THERAPIST

## 2017-07-24 NOTE — THERAPY TREATMENT NOTE
Outpatient Physical Therapy Lymphedema Treatment Note  [facility]     Patient Name: America Crews  : 1950  MRN: [id number]  Today's Date: 2017        Visit Date: 2017    Visit Dx:    ICD-10-CM ICD-9-CM   1. 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 It looked close to my other hand. I was very pleased. I wore my night garment and wrapped it with the bandages. It looked good this morning but not quite as good and now that I have had my old sleeve and glove on it has swelled a little more. Going to [facility] tomorrow to get new product.   -SC      Subjective Pain    Able to rate subjective pain? yes  -SC      Pre-Treatment Pain Level 0  -SC      Post-Treatment Pain Level 0  -SC      Compression/Skin Care    Compression/Skin Care wrapping location;bandaging;compression garment;skin care  -SC      Skin Care washed/dried  -SC      Wrapping Location upper extremity  -SC      Wrapping Location UE left:;fingers to axilla  -SC      Wrapping Comments sahil Tg6, kinesiotape fan strip 5 fingers x2 from medial and lateral epicondyle dorsal forearm to dorsal hand to fingers, grey foam dorsal hand and volar forearm, kinesiotape x 1 fan strip 5 fingers from dorsal proximal arm to posterior inferior just superior to elbow, transelast x 2, artiflex x 2, rosidal K 6, 8 x2, 10. Herring bone from wrist to elbow with 8 cm x 2  -SC      Bandage Layers cotton liner;full limb;short-stretch bandages (comment size/quantity)  -SC      Bandaging Technique circumferential/spiral;figure-eight;light compression  -SC      Bandaging Comments kinesiotape volar and dorsal forearm to fingers with fan strip 5 fingers  each under bandaging, kinesiotape fan strip 5 fingers dorsal arm   -SC        User Key  (r) = Recorded By, (t) = Taken By, (c) = Cosigned By    Initials Name Provider Type    MAIKOL Hutchins PT Physical Therapist                              PT Assessment/Plan       07/24/17 1550       PT Assessment    Assessment Comments noted decreased edema and fibrosis with kinesiotape. To be fitted with new product for daytime tomorrow at local DME. Attempt to transition to daytime and night time garments this week in preparation for vacation in 2 weeks.   -SC     PT Plan    PT Plan Comments assess new compression.   -SC       User Key  (r) = Recorded By, (t) = Taken By, (c) = Cosigned By    Initials Name Provider Type    MAIKOL Hutchins, PT Physical Therapist                     Exercises       07/24/17 1550          Subjective Comments    Subjective Comments My hand was down so much yesterday when I took the bandages off. It looked close to my other hand. I was very pleased. I wore my night garment and wrapped it with the bandages. It looked good this morning but not quite as good and now that I have had my old sleeve and glove on it has swelled a little more. Going to [facility] tomorrow to get new product.   -SC      Subjective Pain    Able to rate subjective pain? yes  -SC      Pre-Treatment Pain Level 0  -SC      Post-Treatment Pain Level 0  -SC        User Key  (r) = Recorded By, (t) = Taken By, (c) = Cosigned By    Initials Name Provider Type    MAIKOL Hutchins PT Physical Therapist                              PT OP Goals       07/24/17 1550       PT Short Term Goals    STG 1 Patient independent and compliant with initial home exercise program focused on diaphragmatic breathing, range of motion, flexibility to decrease edema and improve lymphatic flow for decreased edema and decreased risk of infection.   -SC     STG 1 Progress Met  -SC     STG 2 Patient demonstrate proper awareness of What is  Lymphedema and 18 Steps of Prevention for improved prevention, management, care of symptoms and ease of transition to self-care of condition.   -SC     STG 2 Progress Met  -SC     STG 3 Patient independent and compliant with self-wrapping techniques of compression bandages with spouse/family member as indicated for improved self-management of condition.   -SC     STG 3 Progress Goal Revised   compliant but unable to wrap at home due to lives alone  -SC     STG 4 Patient demonstrate decreased net edema of left upper extremity >/= 15 cm for decrease in edema, symptoms, decreased risk of infection and improved skin care/transition to self-care of condition.   -SC     STG 4 Progress Met  -SC     Long Term Goals    LTG Date to Achieve 08/18/17  -SC     LTG 1 Patient independent and compliant with advanced Home Exercise Program for self-management of condition.   -SC     LTG 1 Progress Progressing  -SC     LTG 1 Progress Comments nearing achievement  -SC     LTG 2 Patient independent and compliant with compression garments as indicated for self-management of condition.   -SC     LTG 2 Progress Ongoing  -SC     LTG 2 Progress Comments to be fitted for new daytime garments tomorrow at local [facility]  -SC     LTG 3 Patient independent and compliant with home pneumatic compression pump for transition to self-care of condition.  -SC     LTG 3 Progress Ongoing  -SC     LTG 3 Progress Comments to have fitter from [employer] assess later this week  -SC     LTG 4 Patient demonstrate decreased net edema of left upper extremity >/= 30 cm for decrease in edema, symptoms, decreased risk of infection and improved skin care/transition to self-care of condition.   -SC     LTG 4 Progress Progressing  -SC     LTG 4 Progress Comments nearing achievement, close to -28.0 cm currently  -SC     Time Calculation    PT Goal Re-Cert Due Date 08/18/17  -SC       User Key  (r) = Recorded By, (t) = Taken By, (c) = Cosigned By    Initials Name Provider Type     MAIKOL Hutchins, PT Physical Therapist                Therapy Education       07/24/17 1550          Therapy Education    Given HEP;Symptoms/condition management;Edema management;Bandaging/dressing change  -SC      Program Reinforced  -SC      How Provided Verbal  -SC      Provided to Patient  -SC      Level of Understanding Verbalized  -SC        User Key  (r) = Recorded By, (t) = Taken By, (c) = Cosigned By    Initials Name Provider Type    MAIKOL Hutchins PT Physical Therapist                Time Calculation:   Start Time: 1550  Stop Time: 1635  Time Calculation (min): 45 min     Therapy Charges for Today     Code Description Service Date Service Provider Modifiers Qty    34423091775  PT THER PROC EA 15 MIN 7/24/2017 Danika Hutchins, PT GP 3                    Danika Renee, PT  7/24/2017

## 2017-07-25 ENCOUNTER — HOSPITAL ENCOUNTER (OUTPATIENT)
Dept: PHYSICAL THERAPY | Facility: HOSPITAL | Age: 67
Setting detail: THERAPIES SERIES
Discharge: HOME | End: 2017-07-25

## 2017-07-25 DIAGNOSIS — I97.2 POSTMASTECTOMY LYMPHEDEMA SYNDROME: Primary | ICD-10-CM

## 2017-07-25 PROCEDURE — 97530 THERAPEUTIC ACTIVITIES: CPT | Performed by: PHYSICAL THERAPIST

## 2017-07-25 PROCEDURE — 97110 THERAPEUTIC EXERCISES: CPT | Performed by: PHYSICAL THERAPIST

## 2017-07-25 NOTE — THERAPY TREATMENT NOTE
Outpatient Physical Therapy Lymphedema Treatment Note  [facility]     Patient Name: America Crews  : 1950  MRN: [id number]  Today's Date: 2017        Visit Date: 2017    Visit Dx:    ICD-10-CM ICD-9-CM   1. 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 It feels good. It's tight but feels nice. It is a little tight at the wrist and my fingers are turning a little bit of a different color but overall I like it. I wore the kinesiotape until I got there for the fitting. The back of my hand was completely flat then. I did try my pump over the weekend. I think it needs to be a little tighter.  -SC My hand was down so much yesterday when I took the bandages off. It looked close to my other hand. I was very pleased. I wore my night garment and wrapped it with the bandages. It looked good this morning but not quite as good and now that I have had my old sleeve and glove on it has swelled a little more. Going to [employer] tomorrow to get new product.   -SC     Subjective Pain    Able to rate subjective pain? yes  -SC yes  -SC     Pre-Treatment Pain Level 0  -SC 0  -SC     Post-Treatment Pain Level 0  -SC 0  -SC     Compression/Skin Care    Compression/Skin Care  wrapping location;bandaging;compression garment;skin care  -SC     Skin Care  washed/dried  -SC     Wrapping Location  upper extremity  -SC     Wrapping Location UE  left:;fingers to axilla  -SC     Wrapping Comments  sahil Tg6, kinesiotape fan strip 5 fingers x2 from medial and lateral epicondyle dorsal forearm to dorsal hand to fingers, grey foam dorsal hand and volar forearm, kinesiotape x 1 fan strip 5 fingers from dorsal proximal arm to  posterior inferior just superior to elbow, transelast x 2, artiflex x 2, rosidal K 6, 8 x2, 10. Herring bone from wrist to elbow with 8 cm x 2  -SC     Bandage Layers  cotton liner;full limb;short-stretch bandages (comment size/quantity)  -SC     Bandaging Technique  circumferential/spiral;figure-eight;light compression  -SC     Compression Garment Comments presents with newly fitted daytime [employer] compression sleeve and glove. Noted proper fit except sleeve at wrist too tight. Instructed way to stretch garment carefully to improve fit. Fingers look ok, patient just getting use to product. Attempted to kinesiotape then don garments however tape would not stay in place. Patient might sleep with taping on if able to perform independently. Will trial on Friday.   -SC      Bandaging Comments  kinesiotape volar and dorsal forearm to fingers with fan strip 5 fingers each under bandaging, kinesiotape fan strip 5 fingers dorsal arm   -SC       User Key  (r) = Recorded By, (t) = Taken By, (c) = Cosigned By    Initials Name Provider Type    MAIKOL Hutchins, PT Physical Therapist                              PT Assessment/Plan       07/25/17 1330 07/24/17 1550    PT Assessment    Assessment Comments trial of daytime compression garments, goal to transition to garment before leaves on Vacation early August. Assessed goals.   -SC noted decreased edema and fibrosis with kinesiotape. To be fitted with new product for daytime tomorrow at local DME. Attempt to transition to daytime and night time garments this week in preparation for vacation in 2 weeks.   -SC    PT Plan    PT Plan Comments assess new compression, circumferential measurements, instruct self use of kinesiotape  -SC assess new compression.   -SC      User Key  (r) = Recorded By, (t) = Taken By, (c) = Cosigned By    Initials Name Provider Type    MAIKOL Hutchins, PT Physical Therapist                     Exercises       07/25/17 1330 07/24/17 1553        Subjective Comments    Subjective Comments I just went and got the new sleeve and glove. It feels good. It's tight but feels nice. It is a little tight at the wrist and my fingers are turning a little bit of a different color but overall I like it. I wore the kinesiotape until I got there for the fitting. The back of my hand was completely flat then. I did try my pump over the weekend. I think it needs to be a little tighter.  -SC My hand was down so much yesterday when I took the bandages off. It looked close to my other hand. I was very pleased. I wore my night garment and wrapped it with the bandages. It looked good this morning but not quite as good and now that I have had my old sleeve and glove on it has swelled a little more. Going to [employer] tomorrow to get new product.   -SC     Subjective Pain    Able to rate subjective pain? yes  -SC yes  -SC     Pre-Treatment Pain Level 0  -SC 0  -SC     Post-Treatment Pain Level 0  -SC 0  -SC       User Key  (r) = Recorded By, (t) = Taken By, (c) = Cosigned By    Initials Name Provider Type    SC Danika Hutchins, PT Physical Therapist                              PT OP Goals       07/25/17 1330 07/24/17 1550    PT Short Term Goals    STG 1 Patient independent and compliant with initial home exercise program focused on diaphragmatic breathing, range of motion, flexibility to decrease edema and improve lymphatic flow for decreased edema and decreased risk of infection.   -SC Patient independent and compliant with initial home exercise program focused on diaphragmatic breathing, range of motion, flexibility to decrease edema and improve lymphatic flow for decreased edema and decreased risk of infection.   -SC    STG 1 Progress Met  -SC Met  -SC    STG 2 Patient demonstrate proper awareness of What is Lymphedema and 18 Steps of Prevention for improved prevention, management, care of symptoms and ease of transition to self-care of condition.   -SC Patient demonstrate  proper awareness of What is Lymphedema and 18 Steps of Prevention for improved prevention, management, care of symptoms and ease of transition to self-care of condition.   -SC    STG 2 Progress Met  -SC Met  -SC    STG 3 Patient independent and compliant with self-wrapping techniques of compression bandages with spouse/family member as indicated for improved self-management of condition.   -SC Patient independent and compliant with self-wrapping techniques of compression bandages with spouse/family member as indicated for improved self-management of condition.   -SC    STG 3 Progress Goal Revised   compliant but unable to wrap at home due to lives alone  -SC Goal Revised   compliant but unable to wrap at home due to lives alone  -SC    STG 4 Patient demonstrate decreased net edema of left upper extremity >/= 15 cm for decrease in edema, symptoms, decreased risk of infection and improved skin care/transition to self-care of condition.   -SC Patient demonstrate decreased net edema of left upper extremity >/= 15 cm for decrease in edema, symptoms, decreased risk of infection and improved skin care/transition to self-care of condition.   -SC    STG 4 Progress Met  -SC Met  -SC    Long Term Goals    LTG Date to Achieve 08/18/17  -SC 08/18/17  -SC    LTG 1 Patient independent and compliant with advanced Home Exercise Program for self-management of condition.   -SC Patient independent and compliant with advanced Home Exercise Program for self-management of condition.   -SC    LTG 1 Progress Progressing  -SC Progressing  -SC    LTG 1 Progress Comments nearing achievement  -SC nearing achievement  -SC    LTG 2 Patient independent and compliant with compression garments as indicated for self-management of condition.   -SC Patient independent and compliant with compression garments as indicated for self-management of condition.   -SC    LTG 2 Progress Progressing  -SC Ongoing  -SC    LTG 2 Progress Comments instructed today,  to wear daytime garments until next appointment Friday 07/28/17 and determine continuation of care from there  -SC to be fitted for new daytime garments tomorrow at local DME  -SC    LTG 3 Patient independent and compliant with home pneumatic compression pump for transition to self-care of condition.  -SC Patient independent and compliant with home pneumatic compression pump for transition to self-care of condition.  -SC    LTG 3 Progress Progressing  -SC Ongoing  -SC    LTG 3 Progress Comments contacted [employer] to assess fit of patient's current product, instructed to begin to use 60 minutes daily in AM  -SC to have fitter from [employer] assess later this week  -SC    LTG 4 Patient demonstrate decreased net edema of left upper extremity >/= 30 cm for decrease in edema, symptoms, decreased risk of infection and improved skin care/transition to self-care of condition.   -SC Patient demonstrate decreased net edema of left upper extremity >/= 30 cm for decrease in edema, symptoms, decreased risk of infection and improved skin care/transition to self-care of condition.   -SC    LTG 4 Progress Progressing  -SC Progressing  -SC    LTG 4 Progress Comments  nearing achievement, close to -28.0 cm currently  -SC    Time Calculation    PT Goal Re-Cert Due Date 08/18/17  -SC 08/18/17  -SC      User Key  (r) = Recorded By, (t) = Taken By, (c) = Cosigned By    Initials Name Provider Type    SC Danika Hutchins, PT Physical Therapist                Therapy Education       07/25/17 1330 07/24/17 1550       Therapy Education    Education Details removed garments and instructed independent donning of sleeve and glove. Patient was able to don independently. Instructed AM pump, garments during day, sleep in night garments. Discussion of kinesiotape  -SC      Given HEP;Symptoms/condition management;Edema management  -SC HEP;Symptoms/condition management;Edema management;Bandaging/dressing change  -SC     Program Modified  -SC  Reinforced  -SC     How Provided Verbal;Demonstration  -SC Verbal  -SC     Provided to Patient  -SC Patient  -SC     Level of Understanding Teach back education performed;Verbalized;Demonstrated  -SC Verbalized  -SC       User Key  (r) = Recorded By, (t) = Taken By, (c) = Cosigned By    Initials Name Provider Type    SC Danika Hutchins, PT Physical Therapist                Time Calculation:   Start Time: 1330  Stop Time: 1415  Time Calculation (min): 45 min     Therapy Charges for Today     Code Description Service Date Service Provider Modifiers Qty    85489267197  PT THER PROC EA 15 MIN 7/25/2017 Danika Hutchins, PT GP 1    [id number]  PT THERAPEUTIC ACT EA 15 MIN 7/25/2017 Danika Hutchins, PT GP 1                    Danika Renee, PT  7/25/2017

## 2017-07-28 ENCOUNTER — HOSPITAL ENCOUNTER (OUTPATIENT)
Dept: PHYSICAL THERAPY | Facility: HOSPITAL | Age: 67
Setting detail: THERAPIES SERIES
Discharge: HOME | End: 2017-07-28

## 2017-07-28 DIAGNOSIS — I97.2 POSTMASTECTOMY LYMPHEDEMA SYNDROME: Primary | ICD-10-CM

## 2017-07-28 PROCEDURE — 97110 THERAPEUTIC EXERCISES: CPT | Performed by: PHYSICAL THERAPIST

## 2017-07-28 NOTE — THERAPY TREATMENT NOTE
Outpatient Physical Therapy Lymphedema Treatment Note  [facility]     Patient Name: America Crews  : 1950  MRN: [id number]  Today's Date: 2017        Visit Date: 2017    Visit Dx:    ICD-10-CM ICD-9-CM   1. 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 I went back on Thursday to [email or web address] and they fitted me with a different glove but nothing worked so I went home in my old products and pumped and elevated and wore my compression sleeve and went back this morning to be refitted as I got the swelling down some. I got a different glove but it is loose in the fingers and tight at the wrist so I am swollen again. I have been wearing it with my old custom sleeve all day so now my arm is swollen too. I was glad I was coming here. I am not sure what to do because I cannot afford custom.   -SC      Subjective Pain    Able to rate subjective pain? yes  -SC      Pre-Treatment Pain Level 0  -SC      Post-Treatment Pain Level 0  -SC      Compression/Skin Care    Compression/Skin Care wrapping location;bandaging;compression garment;skin care  -SC      Skin Care washed/dried  -SC      Wrapping Location upper extremity  -SC      Wrapping Location UE left:;fingers to axilla  -SC      Wrapping Comments sahil Tg6, kinesiotape fan strip 5 fingers x2 from medial and lateral epicondyle dorsal forearm to dorsal hand to fingers, grey foam dorsal hand and volar forearm, kinesiotape x 1 fan strip 5 fingers from dorsal proximal arm to posterior inferior just superior to elbow, transelast x 2, artiflex x 2, rosidal K 6, 8 x2, 10. Herring bone from wrist to elbow with 8 cm x 2  -SC      Bandage Layers cotton liner;full limb;short-stretch bandages  (comment size/quantity)  -SC      Bandaging Technique circumferential/spiral;figure-eight;light compression  -SC      Compression Garment Comments patient presents with old custom sleeve and new OTS glove. Noted inappropriate fit due to loose at fingers, tight at wrist, creating pitting edema dorsal hand  -SC      Bandaging Comments kinesiotape volar and dorsal forearm to fingers with fan strip 5 fingers each under bandaging, kinesiotape fan strip 5 fingers dorsal arm, lift technique palmar wirst for carpal tunnel release  -SC        User Key  (r) = Recorded By, (t) = Taken By, (c) = Cosigned By    Initials Name Provider Type    SC Danika Hutchins, PT Physical Therapist                              PT Assessment/Plan       07/28/17 1542       PT Assessment    Assessment Comments inappropriate fit of OTS garments currently, preformed bandages today with kinesiotape. Patient to return to local [facility] to be refitted on Monday 07/31/17.   -SC     PT Plan    PT Plan Comments assess new compression garments, circumferential measurements. Assess new kinesiotape for carpal tunnel release  -SC       User Key  (r) = Recorded By, (t) = Taken By, (c) = Cosigned By    Initials Name Provider Type    SC Danika Hutchins, PT Physical Therapist                     Exercises       07/28/17 9233          Subjective Comments    Subjective Comments So I am all swollen again. I went back on Thursday to [employer] and they fitted me with a different glove but nothing worked so I went home in my old products and pumped and elevated and wore my compression sleeve and went back this morning to be refitted as I got the swelling down some. I got a different glove but it is loose in the fingers and tight at the wrist so I am swollen again. I have been wearing it with my old custom sleeve all day so now my arm is swollen too. I was glad I was coming here. I am not sure what to do because I cannot afford custom.   -SC      Subjective Pain    Able  to rate subjective pain? yes  -SC      Pre-Treatment Pain Level 0  -SC      Post-Treatment Pain Level 0  -SC        User Key  (r) = Recorded By, (t) = Taken By, (c) = Cosigned By    Initials Name Provider Type    MAIKOL Hutchins, PT Physical Therapist                              PT OP Goals       07/28/17 1545       PT Short Term Goals    STG 1 Patient independent and compliant with initial home exercise program focused on diaphragmatic breathing, range of motion, flexibility to decrease edema and improve lymphatic flow for decreased edema and decreased risk of infection.   -SC     STG 1 Progress Met  -SC     STG 2 Patient demonstrate proper awareness of What is Lymphedema and 18 Steps of Prevention for improved prevention, management, care of symptoms and ease of transition to self-care of condition.   -SC     STG 2 Progress Met  -SC     STG 3 Patient independent and compliant with self-wrapping techniques of compression bandages with spouse/family member as indicated for improved self-management of condition.   -SC     STG 3 Progress Goal Revised   compliant but unable to wrap at home due to lives alone  -SC     STG 4 Patient demonstrate decreased net edema of left upper extremity >/= 15 cm for decrease in edema, symptoms, decreased risk of infection and improved skin care/transition to self-care of condition.   -SC     STG 4 Progress Met  -SC     Long Term Goals    LTG Date to Achieve 08/18/17  -SC     LTG 1 Patient independent and compliant with advanced Home Exercise Program for self-management of condition.   -SC     LTG 1 Progress Progressing  -SC     LTG 2 Patient independent and compliant with compression garments as indicated for self-management of condition.   -SC     LTG 2 Progress Progressing  -SC     LTG 2 Progress Comments inappropriate fit of OTS currently, requires custom but patient unable to afford at this time  -SC     LTG 3 Patient independent and compliant with home pneumatic compression  pump for transition to self-care of condition.  -SC     LTG 3 Progress Met  -SC     LTG 3 Progress Comments using 1-2x/day at this time  -SC     LTG 4 Patient demonstrate decreased net edema of left upper extremity >/= 30 cm for decrease in edema, symptoms, decreased risk of infection and improved skin care/transition to self-care of condition.   -SC     LTG 4 Progress Progressing  -SC     Time Calculation    PT Goal Re-Cert Due Date 08/18/17  -SC       User Key  (r) = Recorded By, (t) = Taken By, (c) = Cosigned By    Initials Name Provider Type    MAIKOL Hutchins PT Physical Therapist                Therapy Education       07/28/17 1545          Therapy Education    Education Details patient to return to [facility] on Monday to return current OTS glove and be refitted to improve lymphatic flow and assist with transition to self-care.   -SC      Given Symptoms/condition management;Edema management;Bandaging/dressing change  -SC      Program Modified  -SC      How Provided Verbal;Demonstration  -SC      Provided to Patient  -SC      Level of Understanding Verbalized  -SC        User Key  (r) = Recorded By, (t) = Taken By, (c) = Cosigned By    Initials Name Provider Type    MAIKOL Hutchins PT Physical Therapist                Time Calculation:   Start Time: 1545  Stop Time: 1630  Time Calculation (min): 45 min     Therapy Charges for Today     Code Description Service Date Service Provider Modifiers Qty    [id number]  PT THER PROC EA 15 MIN 7/28/2017 Danika Hutchins, PT GP 3                    Danika Renee PT  7/28/2017

## 2017-07-31 ENCOUNTER — HOSPITAL ENCOUNTER (OUTPATIENT)
Dept: PHYSICAL THERAPY | Facility: HOSPITAL | Age: 67
Setting detail: THERAPIES SERIES
Discharge: HOME | End: 2017-07-31

## 2017-07-31 ENCOUNTER — OFFICE VISIT (OUTPATIENT)
Dept: ONCOLOGY | Facility: CLINIC | Age: 67
End: 2017-07-31
Attending: INTERNAL MEDICINE

## 2017-07-31 ENCOUNTER — APPOINTMENT (OUTPATIENT)
Dept: OTHER | Facility: HOSPITAL | Age: 67
End: 2017-07-31
Attending: INTERNAL MEDICINE

## 2017-07-31 VITALS
HEIGHT: 61 IN | SYSTOLIC BLOOD PRESSURE: 110 MMHG | BODY MASS INDEX: 32.85 KG/M2 | TEMPERATURE: 98.1 F | OXYGEN SATURATION: 100 % | WEIGHT: 174 LBS | DIASTOLIC BLOOD PRESSURE: 77 MMHG | HEART RATE: 72 BPM | RESPIRATION RATE: 16 BRPM

## 2017-07-31 DIAGNOSIS — I97.2 POSTMASTECTOMY LYMPHEDEMA SYNDROME: Primary | ICD-10-CM

## 2017-07-31 DIAGNOSIS — C50.412 MALIGNANT NEOPLASM OF UPPER-OUTER QUADRANT OF LEFT FEMALE BREAST (HCC): Primary | ICD-10-CM

## 2017-07-31 PROCEDURE — 99213 OFFICE O/P EST LOW 20 MIN: CPT | Performed by: INTERNAL MEDICINE

## 2017-07-31 PROCEDURE — 97110 THERAPEUTIC EXERCISES: CPT | Performed by: PHYSICAL THERAPIST

## 2017-07-31 PROCEDURE — G0463 HOSPITAL OUTPT CLINIC VISIT: HCPCS | Performed by: INTERNAL MEDICINE

## 2017-07-31 NOTE — PROGRESS NOTES
Subjective     REASONS FOR FOLLOWUP:     1. T4N3, ER/VT negative, HER2 positive breast cancer; received dose-dense Adriamycin and Cytoxan and Taxol as well as Herceptin and radiation therapy, further total of a year of Herceptin. Completed March of 2011.  2. Mild decrease in EF although still within the normal range felt to be insignificant.   3. Chronic cough with negative CT scans and normal PFTs in 06/2010 and 12/2010.   4. Genetic testing deferred due to cost.   5. Left axillary skin lesion showing atypical vascular proliferation related to radiation. No signs of overt malignancy.   :     History of Present Illness  patient is a 66 y.o. female with a high-risk ER/VT negative HER-2 positive left breast cancer T4 N 3 treated with adjuvant dose dense Adriamycin Cytoxan and Taxol Herceptin and radiation COMPLETED 6 years ago.  She had seen Dr. Nettles at [facility] office in June for her annual routine follow-up and was felt to be doing well.  In the last few weeks however she has palpated an area subcutaneously near the medial portion of her mastectomy scars.  This is just beneath one of her radiation tattoos.  She was seen by her OB/GYN physician Dr. Putnam on 7/27/2017 who palpated this area and felt that it may be the sternum or xiphoid process that become more readily palpable with the patient's recent intentional weight loss.    The patient is otherwise feeling well and reports that this area is not painful.  On our exam we agree that it may be the xiphoid process given the location and her history we will order a CT scan of the chest to be sure.    Active Ambulatory Problems     Diagnosis Date Noted   • Breast cancer 06/07/2016   • Lymphedema of upper extremity 06/13/2017   • Medicare annual wellness visit, subsequent 06/13/2017     Resolved Ambulatory Problems     Diagnosis Date Noted   • No Resolved Ambulatory Problems     Past Medical History:   Diagnosis Date   • Breast cancer 2009   • Fibroids    • H/O left  breast biopsy 1990   • Hx of bilateral mastectomy 2009   • Ovarian cyst    • Skin cancer      Past Surgical History:   Procedure Laterality Date   • BREAST SURGERY Bilateral 2000    hysteroscopy / lobular carcinoma in situ 1990 excision     ONCOLOGIC HISTORY:  The patient had a history of abnormal mammogram in 1990 with biopsy showing lobular carcinoma in situ. Biopsy measured 7 x 5 x 1.5 cm. She had focal atypical lobular hyperplasia with lobular carcinoma in situ and extensive fibrocystic disease with atypi c al duct hyperplasia. At that time no further therapy or preventative options were given, and the patient was followed closely with mammograms. In 2008 she had an MRI of the breast which revealed a mass at the 1 o'clock position posterior to the nipple m e asuring 1.2 x 1.6 cm which was felt to be a fibroadenoma. This was dated 07/31/08. Mammogram in 2008 was benign. The patient then had a routine mammogram on 10/06/09 at [facility] which showed thickening of the skin and two lymph nodes in the l eft axilla. Ultrasound confirmed a mass in the left upper outer quadrant and thickening of the areolar skin and subareolar tissues, plus two lymph nodes felt to be enlarged. The patient had excisional biopsy by Dr. Daniels at [facility] with removal of an ellipse of skin which showed some peau d'orange and infiltrating ductal carcinoma grade 2 with focal vascular space invasion including in the dermis of the segment of skin attached and positive margins. ER/TX negative, HER2/felice positive. The patient the n underwent left modified radical mastectomy with axillary dissection, and a simple mastectomy on the right, on 11/16/09. The right breast showed atypical lobular hyperplasia and three benign nodes. The left breast showed 1.2 cm area of residual invasive ductal carcinoma grade 3, focal lymphatic invasion of nipple stroma. Margins were clear. There was a fibroadenoma focal lobular carcinoma in situ. Metastatic  carcinoma in 8 of 14 lymph nodes with macrometastasis and no extranodal extension. Staging wo rkup including bone scan, CT scans and PET scan, all were negative for metastatic disease.   Decision was made to treat with dose-dense AC followed by weekly Taxol and Herceptin followed by Herceptin for the rest of the year and chest wall radiation. MUGA showed ejection fraction of 72%.   Emend was added with cycle 2 of treatment because of nausea.   Ejection fraction stable at 68% after four cycles of Adriamycin and Cytoxan. Herceptin and Taxol started around 3-04-10.   The patient tolerated the Taxol and Herceptin well.   Because of crackles in her lungs, CT chest was done on 5/15/10 which showed no abnormalities. PFT's were done and showed a mild restrictive defect. MUGA scan 5/21/10 shows stable EF of 65%. Plan to complete chest wall radiation an d q3 week Herceptin for the rest of the year. No hormonal therapy as she is ER/NV negative.   MUGA scan 8/10 shows EF of 59%. Because it is still not 15 points from her original EF, we are going to repeat the MUGA this time in six weeks instead of 12 weeks and make sure there is no further drop.   MUGA scan 9/17/10 is 57% which is about 15 points from her original EF of 72% which I think is falsely high. I discussed this with Dr. Blade Camargo who agrees that she is still well within the normal range and the o riginal EF is probably falsely high and agrees with continuing Herceptin because of her risk of cardiac disease in this situation and we will follow MUGA scans at eight week intervals.   Repeat MUGA scan dated 11/26/10 showed an ejection fraction of 62% whi ch is up from 57% in September. The patient had a Doppler of the right upper extremity showing evidence of DVT. Lymphedema was felt to be the cause of the swelling. A colonoscopy was done on 10/22/10 which was normal. Herceptin to continue until 03/20 11.   MUGA scan 01/28/11 was stable with an EF of 60%.   The patient  "was seen on 12/16/11, doing well. Chest x-ray was benign. She has no cardiac symptoms. Port is to be removed in 2-3 months with followup at four month intervals.   Patient seen 4/12 doing well. Genetic testing was delayed because she has a high deductible and she is waiting until the end of the year. She wants to keep her port in for now and I have no problems with this.   Patient is seen in 02/2013 doing very well. Chest x-ray appears benign and we will have her port removed.   Patient seen by a dermatologist in May 2013 and some lesions in her axilla were biopsied felt to be possibly recurrent breast cancer initially but then were called post radiation changes w ith atypical vascular proliferation. Patient seen in September 2013 doing well with no new skin lesions. Port has been removed. Chest CT was also done in May 2013 which was benign.   Close followup of axillary skin is planned because of the atypical vascular proliferation and the risk of lymphangiosarcoma.   The patient was seen on 03/16/2015 doing well clinically, 5 years from completion of chemotherapy and 4 years from Herceptin; very high risk disease and with close followup planned for the time being.     Review of Systems   A comprehensive 14 point review of systems was performed and was negative except as mentioned.      Current Outpatient Prescriptions:   •  Unable to find, 1 each 1 (One) Time. Pt is not currently on any medications, Disp: , Rfl:       ALLERGIES:  No Known Allergies    Objective      Vitals:    07/31/17 1051   BP: 110/77   Pulse: 72   Resp: 16   Temp: 98.1 °F (36.7 °C)   TempSrc: Oral   SpO2: 100%   Weight: 174 lb (78.9 kg)   Height: 61\" (154.9 cm)   PainSc: 0-No pain     Current Status 7/31/2017   ECOG score 0       Physical Exam    GENERAL:  Well-developed, well-nourished in no acute distress.   SKIN:  Warm, dry without rashes, purpura or petechiae.  HEAD:  Normocephalic.  EYES:  Pupils equal, round and reactive to light.  EOMs " intact.  Conjunctivae normal.  EARS:  Hearing intact.  NOSE:  Septum midline.  No excoriations or nasal discharge.  MOUTH:  Tongue is well-papillated; no stomatitis or ulcers.  Lips normal.  THROAT:  Oropharynx without lesions or exudates.  NECK:  Supple with good range of motion; no thyromegaly or masses, no JVD.  LYMPHATICS:  No cervical, supraclavicular, axillary or inguinal adenopathy.  CHEST:  Lungs clear to percussion and auscultation. Good airflow.  BREASTS: Bilateral mastectomy scars.  There is a bony prominence near the medial and of her mastectomy scars just beneath one of her radiation tattoos.  This may represent a prominent xiphoid process.  CARDIAC:  Regular rate and rhythm without murmurs, rubs or gallops. Normal S1,S2.  ABDOMEN:  Soft, nontender with no organomegaly or masses.  EXTREMITIES:  No clubbing, cyanosis significant lymphedema in the left arm all the way to the hand  NEUROLOGICAL:  Cranial Nerves II-XII grossly intact.  No focal neurological deficits.  PSYCHIATRIC:  Normal affect and mood.        RECENT LABS:  Hematology WBC   Date Value Ref Range Status   06/12/2017 6.83 4.00 - 10.00 10*3/mm3 Final     RBC   Date Value Ref Range Status   06/12/2017 4.55 3.90 - 5.00 10*6/mm3 Final     Hemoglobin   Date Value Ref Range Status   06/12/2017 13.8 11.5 - 14.9 g/dL Final     Hematocrit   Date Value Ref Range Status   06/12/2017 41.2 34.0 - 45.0 % Final     Platelets   Date Value Ref Range Status   06/12/2017 200 150 - 375 10*3/mm3 Final              Assessment/Plan   1.  T4 N3 ER/ID negative HER-2 positive breast cancer on the left treated with aggressive Herceptin based   chemotherapy and radiation and surgery. She is now over 6 years out from completion of her treatment.  2.  Lymphedema left arm stable  3.  Atypical vascular proliferation left axilla resolved  4.  Palpable area on the chest wall which may be a prominent xiphoid process.  The patient is concerned that she never felt this before  but she has had nearly a 30 pound weight loss which certainly could make this more readily palpable.    Plan  1.  We will schedule an outpatient CT scan of the chest with IV contrast as soon as possible to evaluate this area to be sure that this is a benign finding.  2.  Patient will call for the report of the scan later this week.  Certainly if there are any significant abnormalities we may need to schedule a follow-up appointment in the office in short order.  If the scan shows that this is just a prominent xiphoid process, and she will keep her regularly scheduled annual appointment with Dr. Nettles in June 2018.                  7/31/2017      CC:

## 2017-08-02 ENCOUNTER — HOSPITAL ENCOUNTER (OUTPATIENT)
Dept: PHYSICAL THERAPY | Facility: HOSPITAL | Age: 67
Setting detail: THERAPIES SERIES
Discharge: HOME | End: 2017-08-02

## 2017-08-02 DIAGNOSIS — I97.2 POSTMASTECTOMY LYMPHEDEMA SYNDROME: Primary | ICD-10-CM

## 2017-08-02 PROCEDURE — 97535 SELF CARE MNGMENT TRAINING: CPT | Performed by: PHYSICAL THERAPIST

## 2017-08-02 PROCEDURE — 97110 THERAPEUTIC EXERCISES: CPT | Performed by: PHYSICAL THERAPIST

## 2017-08-02 NOTE — THERAPY TREATMENT NOTE
Outpatient Physical Therapy Lymphedema Treatment Note  [facility]     Patient Name: America Crews  : 1950  MRN: [id number]  Today's Date: 2017        Visit Date: 2017    Visit Dx:    ICD-10-CM ICD-9-CM   1. 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 At night I am pumping, then wrapping my hand and using my night sleeve, then in AM I do a self-massage and put on my daytime garments. It looks almost like the right hand in the morning. However as the day goes on it swells and gets hard in the glove (OTS harmony). I leave for my trip soon so I will do the night compression and self-massage with the daytime garments while I am gone.   -SC      Subjective Pain    Able to rate subjective pain? yes  -SC      Pre-Treatment Pain Level 0  -SC      Post-Treatment Pain Level 0  -SC      LUE Circumferential (cm)    Measurement Location 1 axilla  -SC      Left 1 31 cm  -SC      Measurement Location 2 15 cm above elbow  -SC      Left 2 30 cm  -SC      Measurement Location 3 10 cm above elbow  -SC      Left 3 29.1 cm  -SC      Measurement Location 4 5 cm above elbow  -SC      Left 4 31 cm  -SC      Measurement Location 5 elbow  -SC      Left 5 26 cm  -SC      Measurement Location 6 5 cm below elbow  -SC      Left 6 28.5 cm  -SC      Measurement Location 7 10 cm below elbow  -SC      Left 7 27 cm  -SC      Measurement Location 8 15 cm below elbow  -SC      Left 8 23.3 cm  -SC      Measurement Location 9 20 cm below elbow  -SC      Left 9 18.9 cm  -SC      Measurement Location 10 wrist  -SC      Left 10 16 cm  -SC      Measurement Location 11 midpalm  -SC      Left 11 17.4 cm  -SC      Measurement Location 12 MCPs  -SC      Left 12 16.9 cm   -SC      Measurement Location 13 fingers net total  -SC      Measurement Location 14 net total L UE  -SC      Left 14 295.1 cm  -SC      Measurement Location 15 net decrease since initial evaluation  -SC      Left 15 -33.2 cm  -SC      Compression/Skin Care    Compression Garment Comments presents with daytime OTS compression sleeve and glove with grey foam pad donned correctly. Redonned prior to leaving. Discussion of farrow wrap  -SC        User Key  (r) = Recorded By, (t) = Taken By, (c) = Cosigned By    Initials Name Provider Type    MAIKOL Hutchins, PT Physical Therapist                              PT Assessment/Plan       08/02/17 0845       PT Assessment    Assessment Comments Nearing achievement of all established goals. reaching therapeutic plateau. To trial self-care while on vacation for 1.5 weeks. Reassess when returns. Patient to get new product to sample when returns from vacation as well including Farrow wrap arm sleeve and harmony class II size V glove.   -SC     PT Plan    PT Plan Comments reassess circumferential measurements and new garments pending .   -SC       User Key  (r) = Recorded By, (t) = Taken By, (c) = Cosigned By    Initials Name Provider Type    MAIKOL Hutchins, PT Physical Therapist                     Exercises       08/02/17 0845          Subjective Comments    Subjective Comments I look great in the morning. At night I am pumping, then wrapping my hand and using my night sleeve, then in AM I do a self-massage and put on my daytime garments. It looks almost like the right hand in the morning. However as the day goes on it swells and gets hard in the glove (OTS harmony). I leave for my trip soon so I will do the night compression and self-massage with the daytime garments while I am gone.   -SC      Subjective Pain    Able to rate subjective pain? yes  -SC      Pre-Treatment Pain Level 0  -SC      Post-Treatment Pain Level 0  -SC        User Key  (r) = Recorded  By, (t) = Taken By, (c) = Cosigned By    Initials Name Provider Type    MAIKOL Hutchins, PT Physical Therapist                              PT OP Goals       08/02/17 0845       PT Short Term Goals    STG 1 Patient independent and compliant with initial home exercise program focused on diaphragmatic breathing, range of motion, flexibility to decrease edema and improve lymphatic flow for decreased edema and decreased risk of infection.   -SC     STG 1 Progress Met  -SC     STG 2 Patient demonstrate proper awareness of What is Lymphedema and 18 Steps of Prevention for improved prevention, management, care of symptoms and ease of transition to self-care of condition.   -SC     STG 2 Progress Met  -SC     STG 3 Patient independent and compliant with self-wrapping techniques of compression bandages with spouse/family member as indicated for improved self-management of condition.   -SC     STG 3 Progress Goal Revised   compliant but unable to wrap at home due to lives alone  -SC     STG 4 Patient demonstrate decreased net edema of left upper extremity >/= 15 cm for decrease in edema, symptoms, decreased risk of infection and improved skin care/transition to self-care of condition.   -SC     STG 4 Progress Met  -SC     Long Term Goals    LTG Date to Achieve 08/18/17  -SC     LTG 1 Patient independent and compliant with advanced Home Exercise Program for self-management of condition.   -SC     LTG 1 Progress Progressing  -SC     LTG 1 Progress Comments nearing achievement, will reassess after vacation  -SC     LTG 2 Patient independent and compliant with compression garments as indicated for self-management of condition.   -SC     LTG 2 Progress Progressing  -SC     LTG 2 Progress Comments compliant with current products, ordering a farrow wrap  -SC     LTG 3 Patient independent and compliant with home pneumatic compression pump for transition to self-care of condition.  -SC     LTG 3 Progress Met  -SC     LTG 4  Patient demonstrate decreased net edema of left upper extremity >/= 30 cm for decrease in edema, symptoms, decreased risk of infection and improved skin care/transition to self-care of condition.   -SC     LTG 4 Progress Met  -SC     LTG 4 Progress Comments currently -33.2 cm  -SC     Time Calculation    PT Goal Re-Cert Due Date 08/18/17  -SC       User Key  (r) = Recorded By, (t) = Taken By, (c) = Cosigned By    Initials Name Provider Type    MAIKOL Hutchins PT Physical Therapist                Therapy Education       08/02/17 0845          Therapy Education    Education Details self-care while on vacation  -SC      Given HEP;Symptoms/condition management;Edema management;Bandaging/dressing change  -SC      Program Modified  -SC      How Provided Verbal;Demonstration  -SC      Provided to Patient  -SC      Level of Understanding Teach back education performed;Verbalized;Demonstrated  -SC        User Key  (r) = Recorded By, (t) = Taken By, (c) = Cosigned By    Initials Name Provider Type    MAIKOL Hutchins PT Physical Therapist                Time Calculation:   Start Time: 0845  Stop Time: 0915  Time Calculation (min): 30 min     Therapy Charges for Today     Code Description Service Date Service Provider Modifiers Qty    48848019768  PT THER PROC EA 15 MIN 8/2/2017 Danika Hutchins, PT GP 1    33407831196 HC PT SELF CARE/MGMT/TRAIN EA 15 MIN 8/2/2017 Danika Hutchins PT GP 1                    Danika Renee PT  8/2/2017

## 2017-08-03 ENCOUNTER — HOSPITAL ENCOUNTER (OUTPATIENT)
Dept: PET IMAGING | Facility: HOSPITAL | Age: 67
Discharge: HOME | End: 2017-08-03
Attending: INTERNAL MEDICINE | Admitting: INTERNAL MEDICINE

## 2017-08-03 DIAGNOSIS — C50.412 MALIGNANT NEOPLASM OF UPPER-OUTER QUADRANT OF LEFT FEMALE BREAST (HCC): ICD-10-CM

## 2017-08-03 LAB — CREAT BLDA-MCNC: 0.8 MG/DL (ref 0.6–1.3)

## 2017-08-03 PROCEDURE — 0 IOPAMIDOL 61 % SOLUTION: Performed by: INTERNAL MEDICINE

## 2017-08-03 PROCEDURE — 82565 ASSAY OF CREATININE: CPT

## 2017-08-03 PROCEDURE — 71260 CT THORAX DX C+: CPT

## 2017-08-03 RX ADMIN — IOPAMIDOL 75 ML: 612 INJECTION, SOLUTION INTRAVENOUS at 08:52

## 2017-08-04 ENCOUNTER — APPOINTMENT (OUTPATIENT)
Dept: PHYSICAL THERAPY | Facility: HOSPITAL | Age: 67
End: 2017-08-04

## 2017-08-07 ENCOUNTER — APPOINTMENT (OUTPATIENT)
Dept: PHYSICAL THERAPY | Facility: HOSPITAL | Age: 67
End: 2017-08-07

## 2017-08-18 ENCOUNTER — HOSPITAL ENCOUNTER (OUTPATIENT)
Dept: PHYSICAL THERAPY | Facility: HOSPITAL | Age: 67
Setting detail: THERAPIES SERIES
Discharge: HOME | End: 2017-08-18

## 2017-08-18 DIAGNOSIS — I97.2 POSTMASTECTOMY LYMPHEDEMA SYNDROME: Primary | ICD-10-CM

## 2017-08-18 PROCEDURE — 97110 THERAPEUTIC EXERCISES: CPT | Performed by: PHYSICAL THERAPIST

## 2017-08-18 PROCEDURE — G8985 CARRY GOAL STATUS: HCPCS | Performed by: PHYSICAL THERAPIST

## 2017-08-18 PROCEDURE — G8984 CARRY CURRENT STATUS: HCPCS | Performed by: PHYSICAL THERAPIST

## 2017-08-18 NOTE — THERAPY RE-EVALUATION
Physical Therapy Lymphedema Re-Evaluation  [facility]     Patient Name: America Cresw  : 1950  MRN: [id number]  Today's Date: 2017      Visit Date: 2017    Visit Dx:    ICD-10-CM ICD-9-CM   1. 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 Seems to help pretty well. I am getting a new piece to my pump. It will be like a jacket, all one piece for both arms and chest (right now I have one that is all pieces put together and hard to put on). I did go to [employer] yesterday and get the new glove. It's ok but I do swell inside of it.   -SC      Subjective Pain    Able to rate subjective pain? yes  -SC      Pre-Treatment Pain Level 0  -SC      Post-Treatment Pain Level 0  -SC      LUE Circumferential (cm)    Measurement Location 1 axilla  -SC      Left 1 31 cm  -SC      Measurement Location 2 15 cm above elbow  -SC      Left 2 29.8 cm  -SC      Measurement Location 3 10 cm above elbow  -SC      Left 3 30 cm  -SC      Measurement Location 4 5 cm above elbow  -SC      Left 4 31 cm  -SC      Measurement Location 5 elbow  -SC      Left 5 26.5 cm  -SC      Measurement Location 6 5 cm below elbow  -SC      Left 6 28 cm  -SC      Measurement Location 7 10 cm  below elbow  -SC      Left 7 26.5 cm  -SC      Measurement Location 8 15 cm below elbow  -SC      Left 8 22.8 cm  -SC      Measurement Location 9 20 cm below elbow  -SC      Left 9 17.8 cm  -SC      Measurement Location 10 wrist  -SC      Left 10 16.8 cm  -SC      Measurement Location 11 midpalm  -SC      Left 11 18.1 cm  -SC      Measurement Location 12 MCPs  -SC      Left 12 17.2 cm  -SC      Measurement Location 13 fingers net total  -SC      Measurement Location 14 net total L UE  -SC      Left 14 295.5 cm  -SC      Measurement Location 15 net decrease since initial evaluation  -SC      Left 15 -33.6 cm  -SC      Compression/Skin Care    Compression Garment Comments presents with compression sleeve and glove donned properly with improved fit however still not perfect at this point due to can swell inside glove dorsal hand.   -SC      Bandaging Comments kinesiotape fan strip left UE shoulder to hand, provided handouts for self-taping technique  -SC        User Key  (r) = Recorded By, (t) = Taken By, (c) = Cosigned By    Initials Name Provider Type    SC Danika Hutchins, PT Physical Therapist                                Therapy Education       08/18/17 0830          Therapy Education    Education Details kinesiotape, pump (fitted for jacket)  -SC      Given HEP;Symptoms/condition management;Edema management;Bandaging/dressing change  -SC      Program New  -SC      How Provided Verbal;Demonstration;Written  -SC      Provided to Patient  -SC      Level of Understanding Teach back education performed;Verbalized;Demonstrated  -SC        User Key  (r) = Recorded By, (t) = Taken By, (c) = Cosigned By    Initials Name Provider Type    MAIKOL Hutchins, BENJA Physical Therapist                  Exercises       08/18/17 0830          Subjective Comments    Subjective Comments I have been using the kinesiotape a lot and sleeping in the night garment. Seems to help pretty well. I am getting a new piece to my pump. It  will be like a jacket, all one piece for both arms and chest (right now I have one that is all pieces put together and hard to put on). I did go to [employer] yesterday and get the new glove. It's ok but I do swell inside of it.   -SC      Subjective Pain    Able to rate subjective pain? yes  -SC      Pre-Treatment Pain Level 0  -SC      Post-Treatment Pain Level 0  -SC        User Key  (r) = Recorded By, (t) = Taken By, (c) = Cosigned By    Initials Name Provider Type    SC Danika Hutchins, PT Physical Therapist                              PT OP Goals       08/18/17 0830       PT Short Term Goals    STG 1 Patient independent and compliant with initial home exercise program focused on diaphragmatic breathing, range of motion, flexibility to decrease edema and improve lymphatic flow for decreased edema and decreased risk of infection.   -SC     STG 1 Progress Met  -SC     STG 2 Patient demonstrate proper awareness of What is Lymphedema and 18 Steps of Prevention for improved prevention, management, care of symptoms and ease of transition to self-care of condition.   -SC     STG 2 Progress Met  -SC     STG 3 Patient independent and compliant with self-wrapping techniques of compression bandages with spouse/family member as indicated for improved self-management of condition.   -SC     STG 3 Progress Goal Revised   compliant but unable to wrap at home due to lives alone  -SC     STG 4 Patient demonstrate decreased net edema of left upper extremity >/= 15 cm for decrease in edema, symptoms, decreased risk of infection and improved skin care/transition to self-care of condition.   -SC     STG 4 Progress Met  -SC     Long Term Goals    LTG Date to Achieve 09/15/17  -SC     LTG 1 Patient independent and compliant with advanced Home Exercise Program for self-management of condition.   -SC     LTG 1 Progress Met  -SC     LTG 2 Patient independent and compliant with compression garments as indicated for self-management of  condition.   -SC     LTG 2 Progress Met  -SC     LTG 3 Patient independent and compliant with home pneumatic compression pump for transition to self-care of condition.  -SC     LTG 3 Progress Met  -SC     LTG 3 Progress Comments awaiting new component of jacket to improve lymphatic flow  -SC     LTG 4 Patient demonstrate decreased net edema of left upper extremity >/= 30 cm for decrease in edema, symptoms, decreased risk of infection and improved skin care/transition to self-care of condition.   -SC     LTG 4 Progress Met  -SC     Time Calculation    PT Goal Re-Cert Due Date 09/15/17  -SC       User Key  (r) = Recorded By, (t) = Taken By, (c) = Cosigned By    Initials Name Provider Type    MAIKOL Hutchins, PT Physical Therapist                PT Assessment/Plan       08/18/17 0830       PT Assessment    Assessment Comments Mrs. Crews has currently attended 16/16 formal therapy appointments since initial evaluation 06/21/17 for post mastectomy lymphedema syndrome left UE. She currently is compliant with compression garments daytime and night time, self-wrapping of hand at night, home pneumatic compression pump, self-massage and kinesiotape of left UE. She is about to get a new component for her pump that is a jacket that is one piece for bilateral UEs and chest/abomen to help improved ease of donning, compliance and lymphatic flow. We are also discussing a farrow wrap for left UE during day due to current OTS daytime garments don't help tremidiously. Patient is to return in 2 weeks to determine progress and need for continued therapy vs d/c to self-care.   -SC     PT Plan    PT Frequency Other (comment)  -SC     Predicted Duration of Therapy Intervention (days/wks) once every other week  -SC     PT Plan Comments recert vs d/c  -SC       User Key  (r) = Recorded By, (t) = Taken By, (c) = Cosigned By    Initials Name Provider Type    MAIKOL Hutchins, PT Physical Therapist                 Time Calculation:    Start Time: 0830  Stop Time: 0915  Time Calculation (min): 45 min     Therapy Charges for Today     Code Description Service Date Service Provider Modifiers Qty    55610468108 HC PT CARRY MOV HAND OBJ CURRENT 8/18/2017 Danika Hutchins, PT GP, CI 1    18400147294 HC PT CARRY MOV HAND OBJ PROJECTED 8/18/2017 Danika Hutchins, PT GP, CI 1    72390813243 HC PT THER PROC EA 15 MIN 8/18/2017 Danika Hutchins, PT GP 3          PT G-Codes  PT Professional Judgement Used?: Yes  Functional Limitation: Carrying, moving and handling objects  Carrying, Moving and Handling Objects Current Status (): At least 1 percent but less than 20 percent impaired, limited or restricted  Carrying, Moving and Handling Objects Goal Status (): At least 1 percent but less than 20 percent impaired, limited or restricted         Danika Renee, PT  8/18/2017

## 2017-09-01 ENCOUNTER — HOSPITAL ENCOUNTER (OUTPATIENT)
Dept: PHYSICAL THERAPY | Facility: HOSPITAL | Age: 67
Setting detail: THERAPIES SERIES
Discharge: HOME | End: 2017-09-01

## 2017-09-01 DIAGNOSIS — I97.2 POSTMASTECTOMY LYMPHEDEMA SYNDROME: Primary | ICD-10-CM

## 2017-09-01 PROCEDURE — 97110 THERAPEUTIC EXERCISES: CPT | Performed by: PHYSICAL THERAPIST

## 2017-09-01 NOTE — THERAPY TREATMENT NOTE
Outpatient Physical Therapy Lymphedema Treatment Note  [facility]     Patient Name: America Crews  : 1950  MRN: [id number]  Today's Date: 2017        Visit Date: 2017    Visit Dx:    ICD-10-CM ICD-9-CM   1. 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 I have noticed that when I wear my sleeve and glove my hand tends to swell more. I have been using the kinesiotape, wearing the night sleeve, doing self-massage and my pump. I did get the new chest component for the flexitouch. I really like it.   -SC      Subjective Pain    Able to rate subjective pain? yes  -SC      Pre-Treatment Pain Level 0  -SC      Post-Treatment Pain Level 0  -SC      LUE Circumferential (cm)    Measurement Location 1 axilla  -SC      Left 1 30.5 cm  -SC      Measurement Location 2 15 cm above elbow  -SC      Left 2 29.5 cm  -SC      Measurement Location 3 10 cm above elbow  -SC      Left 3 29.1 cm  -SC      Measurement Location 4 5 cm above elbow  -SC      Left 4 30.7 cm  -SC      Measurement Location 5 elbow  -SC      Left 5 26.4 cm  -SC      Measurement Location 6 5 cm below elbow  -SC      Left 6 28.5 cm  -SC      Measurement Location 7 10 cm below elbow  -SC      Left 7 27.4 cm  -SC      Measurement Location 8 15 cm below elbow  -SC      Left 8 25 cm  -SC      Measurement Location 9 20 cm below elbow  -SC      Left 9 18.5 cm  -SC      Measurement Location 10 wrist  -SC      Left 10 16.7 cm  -SC      Measurement Location 11 midpalm  -SC      Left 11 18 cm  -SC      Measurement Location 12 MCPs  -SC      Left 12 17.3 cm  -SC      Measurement Location 13 fingers net total  -SC      Measurement Location 14 net total L UE  -SC      Left 14 297.6 cm  -SC       Measurement Location 15 net decrease since initial evaluation  -SC      Compression/Skin Care    Bandaging Comments patient presents with kinesiotape donned properly left UE hand to shoulder  -SC        User Key  (r) = Recorded By, (t) = Taken By, (c) = Cosigned By    Initials Name Provider Type    MAIKOL Hutchins PT Physical Therapist                              PT Assessment/Plan       09/01/17 0820       PT Assessment    Assessment Comments Mrs. Crews has achieved all goals and is compliant with home program. To return in one month to reassess circumferential measurements and determine stability of condition. Patient to contact PT during that time with any changes in symptoms, questions or concerns. Patient agrees.   -SC     PT Plan    PT Plan Comments return in one month to determine transition to self-care.   -SC       User Key  (r) = Recorded By, (t) = Taken By, (c) = Cosigned By    Initials Name Provider Type    MAIKOL Hutchins PT Physical Therapist                     Exercises       09/01/17 0820          Subjective Comments    Subjective Comments I am doing well. I have noticed that when I wear my sleeve and glove my hand tends to swell more. I have been using the kinesiotape, wearing the night sleeve, doing self-massage and my pump. I did get the new chest component for the flexitouch. I really like it.   -SC      Subjective Pain    Able to rate subjective pain? yes  -SC      Pre-Treatment Pain Level 0  -SC      Post-Treatment Pain Level 0  -SC        User Key  (r) = Recorded By, (t) = Taken By, (c) = Cosigned By    Initials Name Provider Type    MAIKOL Hutchins PT Physical Therapist                              PT OP Goals       09/01/17 0820       PT Short Term Goals    STG 1 Patient independent and compliant with initial home exercise program focused on diaphragmatic breathing, range of motion, flexibility to decrease edema and improve lymphatic flow for decreased edema and  decreased risk of infection.   -SC     STG 1 Progress Met  -SC     STG 2 Patient demonstrate proper awareness of What is Lymphedema and 18 Steps of Prevention for improved prevention, management, care of symptoms and ease of transition to self-care of condition.   -SC     STG 2 Progress Met  -SC     STG 3 Patient independent and compliant with self-wrapping techniques of compression bandages with spouse/family member as indicated for improved self-management of condition.   -SC     STG 3 Progress Goal Revised   compliant but unable to wrap at home due to lives alone  -SC     STG 4 Patient demonstrate decreased net edema of left upper extremity >/= 15 cm for decrease in edema, symptoms, decreased risk of infection and improved skin care/transition to self-care of condition.   -SC     STG 4 Progress Met  -SC     Long Term Goals    LTG Date to Achieve 09/15/17  -SC     LTG 1 Patient independent and compliant with advanced Home Exercise Program for self-management of condition.   -SC     LTG 1 Progress Met  -SC     LTG 2 Patient independent and compliant with compression garments as indicated for self-management of condition.   -SC     LTG 2 Progress Met  -SC     LTG 3 Patient independent and compliant with home pneumatic compression pump for transition to self-care of condition.  -SC     LTG 3 Progress Met  -SC     LTG 4 Patient demonstrate decreased net edema of left upper extremity >/= 30 cm for decrease in edema, symptoms, decreased risk of infection and improved skin care/transition to self-care of condition.   -SC     LTG 4 Progress Met  -SC     Time Calculation    PT Goal Re-Cert Due Date 09/15/17  -SC       User Key  (r) = Recorded By, (t) = Taken By, (c) = Cosigned By    Initials Name Provider Type    MAIKOL Hutchins, PT Physical Therapist                Therapy Education       09/01/17 0820          Therapy Education    Given Symptoms/condition management;Edema management  -SC      Program Reinforced   -SC      How Provided Verbal  -SC      Provided to Patient  -SC      Level of Understanding Verbalized  -SC        User Key  (r) = Recorded By, (t) = Taken By, (c) = Cosigned By    Initials Name Provider Type    SC Danika Hutchins, PT Physical Therapist                Time Calculation:   Start Time: 0820  Stop Time: 0843  Time Calculation (min): 23 min     Therapy Charges for Today     Code Description Service Date Service Provider Modifiers Qty    83521339145 HC PT THER PROC EA 15 MIN 9/1/2017 Danika Hutchins, PT GP 2                    Danika Renee, PT  9/1/2017

## 2017-09-06 ENCOUNTER — TELEPHONE (OUTPATIENT)
Dept: ONCOLOGY | Facility: HOSPITAL | Age: 67
End: 2017-09-06

## 2017-09-06 NOTE — TELEPHONE ENCOUNTER
Pt calling for CT results.  Reviewed them with Dr. Flood first.  CT negative other than some radiation pneumonitis.  These results were given to the pt via telephone.  No other concerns noted.

## 2017-10-02 ENCOUNTER — APPOINTMENT (OUTPATIENT)
Dept: PHYSICAL THERAPY | Facility: HOSPITAL | Age: 67
End: 2017-10-02

## 2017-10-10 ENCOUNTER — HOSPITAL ENCOUNTER (OUTPATIENT)
Dept: PHYSICAL THERAPY | Facility: HOSPITAL | Age: 67
Setting detail: THERAPIES SERIES
Discharge: HOME | End: 2017-10-10

## 2017-10-10 DIAGNOSIS — I97.2 POSTMASTECTOMY LYMPHEDEMA SYNDROME: Primary | ICD-10-CM

## 2017-10-10 PROCEDURE — G8985 CARRY GOAL STATUS: HCPCS | Performed by: PHYSICAL THERAPIST

## 2017-10-10 PROCEDURE — G8984 CARRY CURRENT STATUS: HCPCS | Performed by: PHYSICAL THERAPIST

## 2017-10-10 PROCEDURE — 97110 THERAPEUTIC EXERCISES: CPT | Performed by: PHYSICAL THERAPIST

## 2017-10-10 NOTE — THERAPY RE-EVALUATION
Physical Therapy Lymphedema Re-Evaluation  [facility]     Patient Name: America Crews  : 1950  MRN: [id number]  Today's Date: 10/10/2017      Visit Date: 10/10/2017    Visit Dx:    ICD-10-CM ICD-9-CM   1. 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 I have been using the kinesiotape more than anything. The compression sleeve seems to make my hand a little worse. I have been loosing weight pretty well. At least 10 pounds since last time I was here. My goal is to loose at least 10 more pounds. I am interested in the dragon boat racing but wanting to talk to you first.   -SC      Subjective Pain    Able to rate subjective pain? yes  -SC      Pre-Treatment Pain Level 0  -SC      Post-Treatment Pain Level 0  -SC      Lymphedema Measurements    Measurement Type(s) Circumferential  -SC      Circumferential Areas Upper extremities  -SC      Lymphedema Measurements Comments new tape measure that measures 1-1.5 cm larger  -SC      LUE Circumferential (cm)    Measurement Location 1 axilla  -SC      Left 1 31 cm  -SC      Measurement Location 2 15 cm above elbow  -SC      Left 2 29.5 cm  -SC      Measurement Location 3 10 cm above elbow  -SC      Left 3 29.5 cm  -SC      Measurement Location 4 5 cm above elbow  -SC       Left 4 30.5 cm  -SC      Measurement Location 5 elbow  -SC      Left 5 27 cm  -SC      Measurement Location 6 5 cm below elbow  -SC      Left 6 29 cm  -SC      Measurement Location 7 10 cm below elbow  -SC      Left 7 27.5 cm  -SC      Measurement Location 8 15 cm below elbow  -SC      Left 8 24.5 cm  -SC      Measurement Location 9 20 cm below elbow  -SC      Left 9 20.5 cm  -SC      Measurement Location 10 wrist  -SC      Left 10 18 cm  -SC      Measurement Location 11 midpalm  -SC      Left 11 18.9 cm  -SC      Measurement Location 12 MCPs  -SC      Left 12 18 cm  -SC      Measurement Location 13 fingers net total  -SC      Measurement Location 14 net total L UE  -SC      Measurement Location 15 net decrease since initial evaluation  -SC      Compression/Skin Care    Compression Garment Comments patient using kinesiotape for 3-5 days, using pump at night, sleeping in night compression garment  -SC      Bandaging Comments patient had just removed kinesiotape  -SC        User Key  (r) = Recorded By, (t) = Taken By, (c) = Cosigned By    Initials Name Provider Type    MAIKOL Hutchins, BENJA Physical Therapist                                Therapy Education       10/10/17 0730          Therapy Education    Education Details instructed continuation of self-care techniques, farrow wrap, dragon boat racing, weight loss  -SC      Given Symptoms/condition management;Edema management;Bandaging/dressing change  -SC      Program Modified  -SC      How Provided Verbal;Demonstration  -SC      Provided to Patient  -SC      Level of Understanding Teach back education performed;Verbalized;Demonstrated  -SC        User Key  (r) = Recorded By, (t) = Taken By, (c) = Cosigned By    Initials Name Provider Type    MAIKOL Hutchins, BENJA Physical Therapist                  Exercises       10/10/17 0730          Subjective Comments    Subjective Comments I am doing well. I have been using the kinesiotape more than anything. The  compression sleeve seems to make my hand a little worse. I have been loosing weight pretty well. At least 10 pounds since last time I was here. My goal is to loose at least 10 more pounds. I am interested in the dragon boat racing but wanting to talk to you first.   -SC      Subjective Pain    Able to rate subjective pain? yes  -SC      Pre-Treatment Pain Level 0  -SC      Post-Treatment Pain Level 0  -SC        User Key  (r) = Recorded By, (t) = Taken By, (c) = Cosigned By    Initials Name Provider Type    SC Danika Hutchins, PT Physical Therapist                              PT OP Goals       10/10/17 0730       PT Short Term Goals    STG 1 Patient independent and compliant with initial home exercise program focused on diaphragmatic breathing, range of motion, flexibility to decrease edema and improve lymphatic flow for decreased edema and decreased risk of infection.   -SC     STG 1 Progress Met  -SC     STG 2 Patient demonstrate proper awareness of What is Lymphedema and 18 Steps of Prevention for improved prevention, management, care of symptoms and ease of transition to self-care of condition.   -SC     STG 2 Progress Met  -SC     STG 3 Patient independent and compliant with self-wrapping techniques of compression bandages with spouse/family member as indicated for improved self-management of condition.   -SC     STG 3 Progress Goal Revised   compliant but unable to wrap at home due to lives alone  -SC     STG 4 Patient demonstrate decreased net edema of left upper extremity >/= 15 cm for decrease in edema, symptoms, decreased risk of infection and improved skin care/transition to self-care of condition.   -SC     STG 4 Progress Met  -SC     Long Term Goals    LTG Date to Achieve 01/09/18  -SC     LTG 1 Patient independent and compliant with advanced Home Exercise Program for self-management of condition.   -SC     LTG 1 Progress Met  -SC     LTG 2 Patient independent and compliant with compression garments  as indicated for self-management of condition.   -SC     LTG 2 Progress Met  -SC     LTG 3 Patient independent and compliant with home pneumatic compression pump for transition to self-care of condition.  -SC     LTG 3 Progress Met  -SC     LTG 4 Patient demonstrate decreased net edema of left upper extremity >/= 30 cm for decrease in edema, symptoms, decreased risk of infection and improved skin care/transition to self-care of condition.   -SC     LTG 4 Progress Met  -SC     Time Calculation    PT Goal Re-Cert Due Date 01/09/18  -SC       User Key  (r) = Recorded By, (t) = Taken By, (c) = Cosigned By    Initials Name Provider Type    MAIKOL Hutchins, PT Physical Therapist                PT Assessment/Plan       10/10/17 0730       PT Assessment    Assessment Comments Mrs. Crews returns after 5 weeks of self-care techniques for post mastectomy lymphedema syndrome left UE. She has remained stable with decreasing fibrosis and ability to self-care for condition. Due to good progress, patient is to return in 3 months to order new garments and determine stability. Patient was instructed to begin with dragon boat racing if interested for exercise, community and social and cardiovascular benefits. We will assess how she is doing next visit. Patient to contact PT during that time with any questions, concerns or changes in symptoms.   -SC     PT Plan    PT Frequency Other (comment)  -SC     Predicted Duration of Therapy Intervention (days/wks) 3 month f/u  -SC     PT Plan Comments return in 3 months to assess stability, progress, and order new garments. Reassess with starting exercise and continued weight loss program.   -SC       User Key  (r) = Recorded By, (t) = Taken By, (c) = Cosigned By    Initials Name Provider Type    MAIKOL Hutchins, PT Physical Therapist                 Time Calculation:   Start Time: 0730  Stop Time: 0815  Time Calculation (min): 45 min     Therapy Charges for Today     Code  Description Service Date Service Provider Modifiers Qty    79797780121 HC PT CARRY MOV HAND OBJ CURRENT 10/10/2017 Danika Hutchins, PT GP, CI 1    68921085324 HC PT CARRY MOV HAND OBJ PROJECTED 10/10/2017 Danika Hutchins, PT GP, CI 1    52054509219 HC PT THER PROC EA 15 MIN 10/10/2017 Danika Hutchins, PT GP 3          PT G-Codes  PT Professional Judgement Used?: Yes  Functional Limitation: Carrying, moving and handling objects  Carrying, Moving and Handling Objects Current Status (): At least 1 percent but less than 20 percent impaired, limited or restricted  Carrying, Moving and Handling Objects Goal Status (): At least 1 percent but less than 20 percent impaired, limited or restricted         Danika Renee, PT  10/10/2017

## 2018-01-09 ENCOUNTER — HOSPITAL ENCOUNTER (OUTPATIENT)
Dept: PHYSICAL THERAPY | Facility: HOSPITAL | Age: 68
Setting detail: THERAPIES SERIES
Discharge: HOME | End: 2018-01-09

## 2018-01-09 DIAGNOSIS — I97.2 POSTMASTECTOMY LYMPHEDEMA SYNDROME: Primary | ICD-10-CM

## 2018-01-09 PROCEDURE — G8984 CARRY CURRENT STATUS: HCPCS | Performed by: PHYSICAL THERAPIST

## 2018-01-09 PROCEDURE — 97110 THERAPEUTIC EXERCISES: CPT | Performed by: PHYSICAL THERAPIST

## 2018-01-09 PROCEDURE — G8985 CARRY GOAL STATUS: HCPCS | Performed by: PHYSICAL THERAPIST

## 2018-01-09 NOTE — THERAPY RE-EVALUATION
Physical Therapy Lymphedema Re-Evaluation  [facility]     Patient Name: America Crews  : 1950  MRN: [id number]  Today's Date: 2018      Visit Date: 2018    Visit Dx:    ICD-10-CM ICD-9-CM   1. 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 I have been using the tape constantly and wrapping if I need to. I have the tape on today and it can make it until Thursday at least so I don't want to take it off yet.   -SC      Pain Assessment    Pain Assessment No/denies pain  -SC      Lymphedema Edema Assessment    Ptting Edema Category By grade out of 4  -SC      Pitting Edema + 1/4 (+1 of 4);Mild  -SC      Edema Assessment Comment soft, non fibrotic edema, negative s/s of infection or progression of disease  -SC      Lymphedema Measurements    Measurement Type(s) Circumferential  -SC      Circumferential Areas Upper extremities  -SC      Lymphedema Measurements Comments with KT tape donned  -SC      LUE Circumferential (cm)    Measurement Location 1 axilla  -SC      Left 1 30 cm  -SC      Measurement Location 2 15 cm above elbow  -SC      Left 2 29 cm  -SC      Measurement Location 3 10 cm above elbow  -SC      Left 3 29.5 cm  -SC      Measurement Location 4 5 cm above elbow  -SC       Left 4 29.7 cm  -SC      Measurement Location 5 elbow  -SC      Left 5 26.5 cm  -SC      Measurement Location 6 5 cm below elbow  -SC      Left 6 28 cm  -SC      Measurement Location 7 10 cm below elbow  -SC      Left 7 27 cm  -SC      Measurement Location 8 15 cm below elbow  -SC      Left 8 24 cm  -SC      Measurement Location 9 20 cm below elbow  -SC      Left 9 19 cm  -SC      Measurement Location 10 wrist  -SC      Left 10 19 cm  -SC      Measurement Location 11 midpalm  -SC      Left 11 19 cm  -SC      Measurement Location 12 MCPs  -SC      Left 12 17.4 cm  -SC      Measurement Location 13 fingers net total  -SC      Measurement Location 14 net total L UE  -SC      Left 14 298.1 cm  -SC      Measurement Location 15 net decrease since initial evaluation  -SC      Compression/Skin Care    Compression/Skin Care Comments presents with KT tape donned appropriately, education of purchase of KT tape online for discounted pricing  -SC        User Key  (r) = Recorded By, (t) = Taken By, (c) = Cosigned By    Initials Name Provider Type    MAIKOL uHtchins PT Physical Therapist                          Therapy Education  Education Details: purchase of KT tape, continuation of self-care of condition  Given: HEP, Symptoms/condition management, Edema management, Other (comment)  Program: Reinforced  How Provided: Verbal, Demonstration, Written  Provided to: Patient  Level of Understanding: Teach back education performed, Verbalized, Demonstrated            Exercises       01/09/18 0820          Subjective Comments    Subjective Comments I feel like my arm has been pretty stable. I have been using the tape constantly and wrapping if I need to. I have the tape on today and it can make it until Thursday at least so I don't want to take it off yet.   -SC        User Key  (r) = Recorded By, (t) = Taken By, (c) = Cosigned By    Initials Name Provider Type    MAIKOL Hutchins PT Physical Therapist                               PT OP Goals       01/09/18 0820       PT Short Term Goals    STG 1 Patient independent and compliant with initial home exercise program focused on diaphragmatic breathing, range of motion, flexibility to decrease edema and improve lymphatic flow for decreased edema and decreased risk of infection.   -SC     STG 1 Progress Met  -SC     STG 2 Patient demonstrate proper awareness of What is Lymphedema and 18 Steps of Prevention for improved prevention, management, care of symptoms and ease of transition to self-care of condition.   -SC     STG 2 Progress Met  -SC     STG 3 Patient independent and compliant with self-wrapping techniques of compression bandages with spouse/family member as indicated for improved self-management of condition.   -SC     STG 3 Progress Goal Revised   compliant but unable to wrap at home due to lives alone  -SC     STG 4 Patient demonstrate decreased net edema of left upper extremity >/= 15 cm for decrease in edema, symptoms, decreased risk of infection and improved skin care/transition to self-care of condition.   -SC     STG 4 Progress Met  -SC     Long Term Goals    LTG Date to Achieve 07/09/18  -SC     LTG 1 Patient independent and compliant with advanced Home Exercise Program for self-management of condition.   -SC     LTG 1 Progress Met  -SC     LTG 2 Patient independent and compliant with compression garments as indicated for self-management of condition.   -SC     LTG 2 Progress Met  -SC     LTG 3 Patient independent and compliant with home pneumatic compression pump for transition to self-care of condition.  -SC     LTG 3 Progress Met  -SC     LTG 4 Patient demonstrate decreased net edema of left upper extremity >/= 30 cm for decrease in edema, symptoms, decreased risk of infection and improved skin care/transition to self-care of condition.   -SC     LTG 4 Progress Met  -SC     LTG 4 Progress Comments stable  -SC     Time Calculation    PT Goal Re-Cert Due  Date 07/09/18  -SC       User Key  (r) = Recorded By, (t) = Taken By, (c) = Cosigned By    Initials Name Provider Type    MAIKOL Hutchins PT Physical Therapist                PT Assessment/Plan       01/09/18 0820       PT Assessment    Assessment Comments Mrs. Crews returns for 3 month f/u assessment of post mastectomy lymphedema syndrome left UE. At this time she is compliant with self-care techniques with stability of circumferential measurements. Her goal is continued weight loss. She may start exercise program or dragon boat racing. She is to return in July to reassess to determine progression of exercise program pending progress.   -SC     PT Plan    PT Frequency Other (comment)  -SC     Predicted Duration of Therapy Intervention (days/wks) 6 month f/u  -SC     PT Plan Comments return in 6 months to assess stability, progress, and order new garments if indicated.   -SC       User Key  (r) = Recorded By, (t) = Taken By, (c) = Cosigned By    Initials Name Provider Type    MAIKOL Hutchins PT Physical Therapist                       Time Calculation:   Start Time: 0820  Stop Time: 0900  Time Calculation (min): 40 min     Therapy Charges for Today     Code Description Service Date Service Provider Modifiers Qty    45527804082 HC PT CARRY MOV HAND OBJ CURRENT 1/9/2018 Danika Hutchins, PT GP, CI 1    [id number] HC PT CARRY MOV HAND OBJ PROJECTED 1/9/2018 Danika Hutchins PT GP, CI 1    [id number] HC PT THER PROC EA 15 MIN 1/9/2018 Danika Hutchins, PT GP 3          PT G-Codes  PT Professional Judgement Used?: Yes  Functional Limitation: Carrying, moving and handling objects  Carrying, Moving and Handling Objects Current Status (): At least 1 percent but less than 20 percent impaired, limited or restricted  Carrying, Moving and Handling Objects Goal Status (): At least 1 percent but less than 20 percent impaired, limited or restricted         Danika Renee  PT  1/9/2018

## 2018-06-14 ENCOUNTER — OFFICE VISIT (OUTPATIENT)
Dept: ONCOLOGY | Facility: CLINIC | Age: 68
End: 2018-06-14

## 2018-06-14 ENCOUNTER — LAB (OUTPATIENT)
Dept: LAB | Facility: HOSPITAL | Age: 68
End: 2018-06-14

## 2018-06-14 VITALS
RESPIRATION RATE: 16 BRPM | HEART RATE: 69 BPM | TEMPERATURE: 97.3 F | DIASTOLIC BLOOD PRESSURE: 82 MMHG | WEIGHT: 153.6 LBS | BODY MASS INDEX: 29 KG/M2 | SYSTOLIC BLOOD PRESSURE: 128 MMHG | OXYGEN SATURATION: 99 % | HEIGHT: 61 IN

## 2018-06-14 DIAGNOSIS — C50.412 MALIGNANT NEOPLASM OF UPPER-OUTER QUADRANT OF LEFT BREAST IN FEMALE, ESTROGEN RECEPTOR NEGATIVE (HCC): Primary | ICD-10-CM

## 2018-06-14 DIAGNOSIS — Z17.1 MALIGNANT NEOPLASM OF UPPER-OUTER QUADRANT OF LEFT BREAST IN FEMALE, ESTROGEN RECEPTOR NEGATIVE (HCC): Primary | ICD-10-CM

## 2018-06-14 DIAGNOSIS — C50.412 MALIGNANT NEOPLASM OF UPPER-OUTER QUADRANT OF LEFT FEMALE BREAST (HCC): ICD-10-CM

## 2018-06-14 LAB
ALBUMIN SERPL-MCNC: 4.2 G/DL (ref 3.5–5.2)
ALBUMIN/GLOB SERPL: 1.3 G/DL (ref 1.1–2.4)
ALP SERPL-CCNC: 107 U/L (ref 38–116)
ALT SERPL W P-5'-P-CCNC: 22 U/L (ref 0–33)
ANION GAP SERPL CALCULATED.3IONS-SCNC: 11.9 MMOL/L
AST SERPL-CCNC: 21 U/L (ref 0–32)
BASOPHILS # BLD AUTO: 0.05 10*3/MM3 (ref 0–0.1)
BASOPHILS NFR BLD AUTO: 0.7 % (ref 0–1.1)
BILIRUB SERPL-MCNC: 0.4 MG/DL (ref 0.1–1.2)
BUN BLD-MCNC: 19 MG/DL (ref 6–20)
BUN/CREAT SERPL: 25.7 (ref 7.3–30)
CALCIUM SPEC-SCNC: 9.3 MG/DL (ref 8.5–10.2)
CHLORIDE SERPL-SCNC: 101 MMOL/L (ref 98–107)
CO2 SERPL-SCNC: 27.1 MMOL/L (ref 22–29)
CREAT BLD-MCNC: 0.74 MG/DL (ref 0.6–1.1)
DEPRECATED RDW RBC AUTO: 42.9 FL (ref 37–49)
EOSINOPHIL # BLD AUTO: 0.08 10*3/MM3 (ref 0–0.36)
EOSINOPHIL NFR BLD AUTO: 1.2 % (ref 1–5)
ERYTHROCYTE [DISTWIDTH] IN BLOOD BY AUTOMATED COUNT: 12.6 % (ref 11.7–14.5)
GFR SERPL CREATININE-BSD FRML MDRD: 78 ML/MIN/1.73
GLOBULIN UR ELPH-MCNC: 3.3 GM/DL (ref 1.8–3.5)
GLUCOSE BLD-MCNC: 85 MG/DL (ref 74–124)
HCT VFR BLD AUTO: 41.3 % (ref 34–45)
HGB BLD-MCNC: 13.7 G/DL (ref 11.5–14.9)
IMM GRANULOCYTES # BLD: 0.01 10*3/MM3 (ref 0–0.03)
IMM GRANULOCYTES NFR BLD: 0.1 % (ref 0–0.5)
LYMPHOCYTES # BLD AUTO: 1.91 10*3/MM3 (ref 1–3.5)
LYMPHOCYTES NFR BLD AUTO: 27.6 % (ref 20–49)
MCH RBC QN AUTO: 30.6 PG (ref 27–33)
MCHC RBC AUTO-ENTMCNC: 33.2 G/DL (ref 32–35)
MCV RBC AUTO: 92.4 FL (ref 83–97)
MONOCYTES # BLD AUTO: 0.39 10*3/MM3 (ref 0.25–0.8)
MONOCYTES NFR BLD AUTO: 5.6 % (ref 4–12)
NEUTROPHILS # BLD AUTO: 4.49 10*3/MM3 (ref 1.5–7)
NEUTROPHILS NFR BLD AUTO: 64.8 % (ref 39–75)
NRBC BLD MANUAL-RTO: 0 /100 WBC (ref 0–0)
PLATELET # BLD AUTO: 173 10*3/MM3 (ref 150–375)
PMV BLD AUTO: 9.7 FL (ref 8.9–12.1)
POTASSIUM BLD-SCNC: 4.2 MMOL/L (ref 3.5–4.7)
PROT SERPL-MCNC: 7.5 G/DL (ref 6.3–8)
RBC # BLD AUTO: 4.47 10*6/MM3 (ref 3.9–5)
SODIUM BLD-SCNC: 140 MMOL/L (ref 134–145)
WBC NRBC COR # BLD: 6.93 10*3/MM3 (ref 4–10)

## 2018-06-14 PROCEDURE — 99213 OFFICE O/P EST LOW 20 MIN: CPT | Performed by: INTERNAL MEDICINE

## 2018-06-14 PROCEDURE — 80053 COMPREHEN METABOLIC PANEL: CPT | Performed by: INTERNAL MEDICINE

## 2018-06-14 PROCEDURE — 36415 COLL VENOUS BLD VENIPUNCTURE: CPT | Performed by: INTERNAL MEDICINE

## 2018-06-14 PROCEDURE — 85025 COMPLETE CBC W/AUTO DIFF WBC: CPT | Performed by: INTERNAL MEDICINE

## 2018-06-14 NOTE — PROGRESS NOTES
Subjective     REASONS FOR FOLLOWUP:     1. T4N3, ER/AL negative, HER2 positive breast cancer; received dose-dense Adriamycin and Cytoxan and Taxol as well as Herceptin and radiation therapy, further total of a year of Herceptin. Completed March of 2011.  2. Mild decrease in EF although still within the normal range felt to be insignificant.   3. Chronic cough with negative CT scans and normal PFTs in 06/2010 and 12/2010.   4. Genetic testing deferred due to cost.   5. Left axillary skin lesion showing atypical vascular proliferation related to radiation. No signs of overt malignancy.   :     History of Present Illness  patient is a 67 y.o. female with a high-risk ER/AL negative HER-2 positive left breast cancer T4 N 3 treated with adjuvant dose dense Adriamycin Cytoxan and Taxol Herceptin and radiation COMPLETED 8 years ago.    Last year she was seen with complaint of a tender nodule in the chest which was felt to be bony prominence after weight loss.  A CAT scan was done which was thankfully benign.  She is gone on a special diet and is lost an additional 20 pounds and feels fine and has no complaints.  Her left arm lymphedema remains a big issue and she is going to the clinic in getting it wrapped periodically    She is due for her colonoscopies    Active Ambulatory Problems     Diagnosis Date Noted   • Breast cancer 06/07/2016   • Lymphedema of upper extremity 06/13/2017   • Medicare annual wellness visit, subsequent 06/13/2017     Resolved Ambulatory Problems     Diagnosis Date Noted   • No Resolved Ambulatory Problems     Past Medical History:   Diagnosis Date   • Breast cancer 2009   • Fibroids    • H/O abnormal mammogram 1990   • H/O left breast biopsy 1990   • Hx of bilateral mastectomy 2009   • Ovarian cysts    • Skin cancer      Past Surgical History:   Procedure Laterality Date   • BREAST BIOPSY Left 1990   • BREAST BIOPSY Left 2009   • BREAST BIOPSY Right    • BREAST SURGERY Bilateral 2000    lobular  carcinoma in situ 1990 excision   • HYSTEROSCOPY  2000   • MASTECTOMY Bilateral 2009     ONCOLOGIC HISTORY:  The patient had a history of abnormal mammogram in 1990 with biopsy showing lobular carcinoma in situ. Biopsy measured 7 x 5 x 1.5 cm. She had focal atypical lobular hyperplasia with lobular carcinoma in situ and extensive fibrocystic disease with atypi c al duct hyperplasia. At that time no further therapy or preventative options were given, and the patient was followed closely with mammograms. In 2008 she had an MRI of the breast which revealed a mass at the 1 o'clock position posterior to the nipple m e asuring 1.2 x 1.6 cm which was felt to be a fibroadenoma. This was dated 07/31/08. Mammogram in 2008 was benign. The patient then had a routine mammogram on 10/06/09 at [facility] which showed thickening of the skin and two lymph nodes in the l eft axilla. Ultrasound confirmed a mass in the left upper outer quadrant and thickening of the areolar skin and subareolar tissues, plus two lymph nodes felt to be enlarged. The patient had excisional biopsy by Dr. Daniels at [place] with removal of an ellipse of skin which showed some peau d'orange and infiltrating ductal carcinoma grade 2 with focal vascular space invasion including in the dermis of the segment of skin attached and positive margins. ER/NY negative, HER2/felice positive. The patient the n underwent left modified radical mastectomy with axillary dissection, and a simple mastectomy on the right, on 11/16/09. The right breast showed atypical lobular hyperplasia and three benign nodes. The left breast showed 1.2 cm area of residual invasive ductal carcinoma grade 3, focal lymphatic invasion of nipple stroma. Margins were clear. There was a fibroadenoma focal lobular carcinoma in situ. Metastatic carcinoma in 8 of 14 lymph nodes with macrometastasis and no extranodal extension. Staging wo rkup including bone scan, CT scans and PET scan, all were  negative for metastatic disease.   Decision was made to treat with dose-dense AC followed by weekly Taxol and Herceptin followed by Herceptin for the rest of the year and chest wall radiation. MUGA showed ejection fraction of 72%.   Emend was added with cycle 2 of treatment because of nausea.   Ejection fraction stable at 68% after four cycles of Adriamycin and Cytoxan. Herceptin and Taxol started around 3-04-10.   The patient tolerated the Taxol and Herceptin well.   Because of crackles in her lungs, CT chest was done on 5/15/10 which showed no abnormalities. PFT's were done and showed a mild restrictive defect. MUGA scan 5/21/10 shows stable EF of 65%. Plan to complete chest wall radiation an d q3 week Herceptin for the rest of the year. No hormonal therapy as she is ER/NV negative.   MUGA scan 8/10 shows EF of 59%. Because it is still not 15 points from her original EF, we are going to repeat the MUGA this time in six weeks instead of 12 weeks and make sure there is no further drop.   MUGA scan 9/17/10 is 57% which is about 15 points from her original EF of 72% which I think is falsely high. I discussed this with Dr. Blade Camargo who agrees that she is still well within the normal range and the o riginal EF is probably falsely high and agrees with continuing Herceptin because of her risk of cardiac disease in this situation and we will follow MUGA scans at eight week intervals.   Repeat MUGA scan dated 11/26/10 showed an ejection fraction of 62% whi ch is up from 57% in September. The patient had a Doppler of the right upper extremity showing evidence of DVT. Lymphedema was felt to be the cause of the swelling. A colonoscopy was done on 10/22/10 which was normal. Herceptin to continue until 03/20 11.   MUGA scan 01/28/11 was stable with an EF of 60%.   The patient was seen on 12/16/11, doing well. Chest x-ray was benign. She has no cardiac symptoms. Port is to be removed in 2-3 months with followup at four month  "intervals.   Patient seen 4/12 doing well. Genetic testing was delayed because she has a high deductible and she is waiting until the end of the year. She wants to keep her port in for now and I have no problems with this.   Patient is seen in 02/2013 doing very well. Chest x-ray appears benign and we will have her port removed.   Patient seen by a dermatologist in May 2013 and some lesions in her axilla were biopsied felt to be possibly recurrent breast cancer initially but then were called post radiation changes w ith atypical vascular proliferation. Patient seen in September 2013 doing well with no new skin lesions. Port has been removed. Chest CT was also done in May 2013 which was benign.   Close followup of axillary skin is planned because of the atypical vascular proliferation and the risk of lymphangiosarcoma.   The patient was seen on 03/16/2015 doing well clinically, 5 years from completion of chemotherapy and 4 years from Herceptin; very high risk disease and with close followup planned for the time being.     Review of Systems   A comprehensive 14 point review of systems was performed and was negative except as mentioned.      Current Outpatient Prescriptions:   •  Unable to find, 1 each 1 (One) Time. Pt is not currently on any medications, Disp: , Rfl:       ALLERGIES:  No Known Allergies    Objective      Vitals:    06/14/18 1014   BP: 128/82   Pulse: 69   Resp: 16   Temp: 97.3 °F (36.3 °C)   TempSrc: Oral   SpO2: 99%   Weight: 69.7 kg (153 lb 9.6 oz)   Height: 155.5 cm (61.22\")  Comment: New Ht Yearly   PainSc: 0-No pain     Current Status 6/14/2018   ECOG score 0       Physical Exam    GENERAL:  Well-developed, well-nourished in no acute distress.   SKIN:  Warm, dry without rashes, purpura or petechiae.  HEAD:  Normocephalic.  EYES:  Pupils equal, round and reactive to light.  EOMs intact.  Conjunctivae normal.  EARS:  Hearing intact.  NOSE:  Septum midline.  No excoriations or nasal " discharge.  MOUTH:  Tongue is well-papillated; no stomatitis or ulcers.  Lips normal.  THROAT:  Oropharynx without lesions or exudates.  NECK:  Supple with good range of motion; no thyromegaly or masses, no JVD.  LYMPHATICS:  No cervical, supraclavicular, axillary or inguinal adenopathy.  CHEST:  Lungs clear to percussion and auscultation. Good airflow.  BREASTS: Bilateral mastectomy scars.  There is a bony prominence near the medial and of her mastectomy scars just beneath one of her radiation tattoos.  This may represent a prominent xiphoid process.  CARDIAC:  Regular rate and rhythm without murmurs, rubs or gallops. Normal S1,S2.  ABDOMEN:  Soft, nontender with no organomegaly or masses.  EXTREMITIES:  No clubbing, cyanosis significant lymphedema in the left arm all the way to the hand  NEUROLOGICAL:  Cranial Nerves II-XII grossly intact.  No focal neurological deficits.  PSYCHIATRIC:  Normal affect and mood.        RECENT LABS:  Hematology WBC   Date Value Ref Range Status   06/14/2018 6.93 4.00 - 10.00 10*3/mm3 Final     RBC   Date Value Ref Range Status   06/14/2018 4.47 3.90 - 5.00 10*6/mm3 Final     Hemoglobin   Date Value Ref Range Status   06/14/2018 13.7 11.5 - 14.9 g/dL Final     Hematocrit   Date Value Ref Range Status   06/14/2018 41.3 34.0 - 45.0 % Final     Platelets   Date Value Ref Range Status   06/14/2018 173 150 - 375 10*3/mm3 Final          CT CHEST  FINDINGS: There is more prominent groundglass density and scarring at  the anterior aspect of the left apical region. There are no suspicious  pulmonary opacities or nodules. There are no pleural or pericardial  effusions. There is no lymphadenopathy within the chest. Aberrant origin  of the right subclavian artery is noted. There is  fine honeycombing at  the posterior lung bases which appear stable. There is no change in the  appearance of the anterior chest wall with postsurgical scarring. No  anterior chest wall lesion is seen. No suspicious  bone lesion is seen.  No acute abnormality is seen in the visualized upper abdomen     IMPRESSION:  More prominent radiation pneumonitis and scarring at the  left apical region. There is no evidence for metastatic disease and  there is otherwise no new abnormality.     This report was finalized on 8/8/2017     Assessment/Plan   1.  T4 N3 ER/HI negative HER-2 positive breast cancer on the left treated with aggressive Herceptin based   chemotherapy and radiation and surgery. She is now over 6 years out from completion of her treatment.  2.  Lymphedema left arm stable  3.  Atypical vascular proliferation left axilla resolved  4.  Palpable area on the chest wall which may be a prominent xiphoid process.  The patient is concerned that she never felt this before but she has had nearly a 30 pound weight loss which certainly could make this more readily palpable.    Plan  1 return in 1 year for follow-up                  6/14/2018      CC:

## 2018-07-09 ENCOUNTER — HOSPITAL ENCOUNTER (OUTPATIENT)
Dept: PHYSICAL THERAPY | Facility: HOSPITAL | Age: 68
Setting detail: THERAPIES SERIES
Discharge: HOME | End: 2018-07-09

## 2018-07-09 DIAGNOSIS — I97.2 POSTMASTECTOMY LYMPHEDEMA SYNDROME: Primary | ICD-10-CM

## 2018-07-09 DIAGNOSIS — Z00.00 MEDICARE ANNUAL WELLNESS VISIT, SUBSEQUENT: ICD-10-CM

## 2018-07-09 DIAGNOSIS — C50.412 MALIGNANT NEOPLASM OF UPPER-OUTER QUADRANT OF LEFT BREAST IN FEMALE, ESTROGEN RECEPTOR NEGATIVE (HCC): ICD-10-CM

## 2018-07-09 DIAGNOSIS — I89.0 LYMPHEDEMA OF UPPER EXTREMITY: ICD-10-CM

## 2018-07-09 DIAGNOSIS — Z17.1 MALIGNANT NEOPLASM OF UPPER-OUTER QUADRANT OF LEFT BREAST IN FEMALE, ESTROGEN RECEPTOR NEGATIVE (HCC): ICD-10-CM

## 2018-07-09 PROCEDURE — G8985 CARRY GOAL STATUS: HCPCS | Performed by: PHYSICAL THERAPIST

## 2018-07-09 PROCEDURE — G8984 CARRY CURRENT STATUS: HCPCS | Performed by: PHYSICAL THERAPIST

## 2018-07-09 PROCEDURE — 97110 THERAPEUTIC EXERCISES: CPT | Performed by: PHYSICAL THERAPIST

## 2018-07-09 NOTE — THERAPY RE-EVALUATION
Physical Therapy Lymphedema Re-Evaluation  [facility]     Patient Name: America Crews  : 1950  MRN: [id number]  Today's Date: 2018      Visit Date: 2018    Visit Dx:    ICD-10-CM ICD-9-CM   1. 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 Lymphedema of upper extremity I89.0 457.1   3. Medicare annual wellness visit, subsequent Z00.00 V70.0   4. Malignant neoplasm of upper-outer quadrant of left breast in female, estrogen receptor negative (CMS/HCC) C50.412 174.4    Z17.1 V86.1       Patient Active Problem List   Diagnosis   • Breast cancer (CMS/HCC)   • Lymphedema of upper extremity   • Medicare annual wellness visit, subsequent        Past Medical History:   Diagnosis Date   • Breast cancer (CMS/HCC)     Left   • Fibroids    • H/O abnormal mammogram     with biopsy showing lobular carcinoma in situ   • H/O left breast biopsy    • Hx of bilateral mastectomy    • Ovarian cysts    • Skin cancer     Nose        Past Surgical History:   Procedure Laterality Date   • BREAST BIOPSY Left    • BREAST BIOPSY Left    • BREAST BIOPSY Right    • BREAST SURGERY Bilateral     lobular carcinoma in situ  excision   • HYSTEROSCOPY     • MASTECTOMY Bilateral        Visit Dx:    ICD-10-CM ICD-9-CM   1. 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 Lymphedema of upper extremity I89.0 457.1   3. Medicare annual wellness visit, subsequent Z00.00 V70.0   4. Malignant neoplasm of upper-outer quadrant of left breast in female, estrogen receptor negative (CMS/HCC) C50.412 174.4    Z17.1 V86.1                 Lymphedema     Row Name 18 0815             Subjective Pain    Able to rate subjective pain? yes  -SC      Pre-Treatment Pain Level 0  -SC      Post-Treatment Pain Level 0  -SC         Subjective Comments    Subjective Comments I worked out in the yard yesterday so it seems a little more swollen. I have kept my weight off. More than 50#. I mostly use the  kinesiotape and use the pump each morning. That seems to help. I do wrap my hand with the foam pad if I need. Sometimes I wear my sleeve and glove during the day, like working in the yard. I haven't traveled again since I was here last.   -SC         Lymphedema Edema Assessment    Ptting Edema Category By grade out of 4  -SC      Pitting Edema + 1/4 (+1 of 4);Mild  -SC      Edema Assessment Comment soft, non fibrotic left UE grossly axilla to fingers  -SC         Skin Changes/Observations    Skin Observations Comment normal, healthy skin with stage II post mastectomy lymphedema syndrome L UE  -SC         Lymphedema Measurements    Measurement Type(s) Circumferential  -SC      Circumferential Areas Upper extremities  -SC      Lymphedema Measurements Comments with KT tape donned  -SC         LUE Circumferential (cm)    Measurement Location 1 axilla  -SC      Left 1 29.1 cm  -SC      Measurement Location 2 15 cm above elbow  -SC      Left 2 28.7 cm  -SC      Measurement Location 3 10 cm above elbow  -SC      Measurement Location 4 5 cm above elbow  -SC      Left 4 28.2 cm  -SC      Measurement Location 5 elbow  -SC      Measurement Location 6 5 cm below elbow  -SC      Left 6 28.5 cm  -SC      Measurement Location 7 10 cm below elbow  -SC      Measurement Location 8 15 cm below elbow  -SC      Left 8 22 cm  -SC      Measurement Location 9 20 cm below elbow  -SC      Measurement Location 10 wrist  -SC      Left 10 18.9 cm  -SC      Measurement Location 11 midpalm  -SC      Measurement Location 12 MCPs  -SC      Left 12 17.5 cm  -SC      Measurement Location 13 fingers net total  -SC      Measurement Location 14 net total L UE  -SC      Measurement Location 15 net decrease since initial evaluation  -SC         Compression/Skin Care    Compression/Skin Care Comments patient presents with KT tape donned properly  -SC        User Key  (r) = Recorded By, (t) = Taken By, (c) = Cosigned By    Initials Name Provider Type    SC  Danika Hutchins, PT Physical Therapist                          Therapy Education  Education Details: continuation of care  Given: Symptoms/condition management, Edema management, Bandaging/dressing change, Other (comment)  Program: Reinforced  How Provided: Verbal, Demonstration  Provided to: Patient  Level of Understanding: Verbalized            Exercises     Row Name 07/09/18 0815             Subjective Comments    Subjective Comments I worked out in the yard yesterday so it seems a little more swollen. I have kept my weight off. More than 50#. I mostly use the kinesiotape and use the pump each morning. That seems to help. I do wrap my hand with the foam pad if I need. Sometimes I wear my sleeve and glove during the day, like working in the yard. I haven't traveled again since I was here last.   -SC         Subjective Pain    Able to rate subjective pain? yes  -SC      Pre-Treatment Pain Level 0  -SC      Post-Treatment Pain Level 0  -SC        User Key  (r) = Recorded By, (t) = Taken By, (c) = Cosigned By    Initials Name Provider Type    SC Danika Hutchins, PT Physical Therapist                              PT OP Goals     Row Name 07/09/18 0815          PT Short Term Goals    STG 1 Patient independent and compliant with initial home exercise program focused on diaphragmatic breathing, range of motion, flexibility to decrease edema and improve lymphatic flow for decreased edema and decreased risk of infection.   -SC     STG 1 Progress Met  -SC     STG 2 Patient demonstrate proper awareness of What is Lymphedema and 18 Steps of Prevention for improved prevention, management, care of symptoms and ease of transition to self-care of condition.   -SC     STG 2 Progress Met  -SC     STG 3 Patient independent and compliant with self-wrapping techniques of compression bandages with spouse/family member as indicated for improved self-management of condition.   -SC     STG 3 Progress Goal Revised   compliant but  unable to wrap at home due to lives alone  -SC     STG 4 Patient demonstrate decreased net edema of left upper extremity >/= 15 cm for decrease in edema, symptoms, decreased risk of infection and improved skin care/transition to self-care of condition.   -SC     STG 4 Progress Met  -SC        Long Term Goals    LTG Date to Achieve 12/10/18  -SC     LTG 1 Patient independent and compliant with advanced Home Exercise Program for self-management of condition.   -SC     LTG 1 Progress Met  -SC     LTG 2 Patient independent and compliant with compression garments as indicated for self-management of condition.   -SC     LTG 2 Progress Met  -SC     LTG 3 Patient independent and compliant with home pneumatic compression pump for transition to self-care of condition.  -SC     LTG 3 Progress Met  -SC     LTG 4 Patient demonstrate decreased net edema of left upper extremity >/= 30 cm for decrease in edema, symptoms, decreased risk of infection and improved skin care/transition to self-care of condition.   -SC     LTG 4 Progress Met  -SC        Time Calculation    PT Goal Re-Cert Due Date 12/10/18  -SC       User Key  (r) = Recorded By, (t) = Taken By, (c) = Cosigned By    Initials Name Provider Type    SC Danika Hutchins, PT Physical Therapist                PT Assessment/Plan     Row Name 07/09/18 0815          PT Assessment    Assessment Comments Mrs. Crews returns for 6 month f/u assessment of post mastectomy stage II lymphedema syndrome left UE axilla to fingers. She presents with kinesiotape donned correctly left UE. She reports compliance with compression bandaging, compression garments and home pneumatic compression pump. Her weight loss has stayed stable as well as her circumferential measurements of left UE. Left UE lymphedema feels healthy, non fibrotic, non pitting, non tender with good skin coloration. She is not in need of new compression at this time. She is to return in 6 months to reassess and order new  compression at that time if indicated. She is to contact me during that time with any questions, concerns or changes in symptoms. Patient agrees.   -SC        PT Plan    PT Frequency Other (comment)  -SC     Predicted Duration of Therapy Intervention (Therapy Eval) 6 month f/u assessment  -SC     PT Plan Comments return in Dec to reassess all circumferential measurements, order new garments if indicated.   -SC       User Key  (r) = Recorded By, (t) = Taken By, (c) = Cosigned By    Initials Name Provider Type    SC Danika Hutchins, PT Physical Therapist                       Time Calculation:   Start Time: 0815  Stop Time: 0832  Time Calculation (min): 17 min  Total Timed Code Minutes- PT: 17 minute(s)   Therapy Suggested Charges     Code   Minutes Charges    None           Therapy Charges for Today     Code Description Service Date Service Provider Modifiers Qty    40982606245 HC PT CARRY MOV HAND OBJ CURRENT 7/9/2018 Danika Hutchins, PT GP, CI 1    90744381149 HC PT CARRY MOV HAND OBJ PROJECTED 7/9/2018 Danika Hutchins, PT GP, CI 1    89749155333 HC PT THER PROC EA 15 MIN 7/9/2018 Danika Hutchins, PT GP 1          PT G-Codes  PT Professional Judgement Used?: Yes  Functional Limitation: Carrying, moving and handling objects  Carrying, Moving and Handling Objects Current Status (): At least 1 percent but less than 20 percent impaired, limited or restricted  Carrying, Moving and Handling Objects Goal Status (): At least 1 percent but less than 20 percent impaired, limited or restricted         Danika Renee PT  7/9/2018

## 2018-12-10 ENCOUNTER — HOSPITAL ENCOUNTER (OUTPATIENT)
Dept: PHYSICAL THERAPY | Facility: HOSPITAL | Age: 68
Setting detail: THERAPIES SERIES
Discharge: HOME | End: 2018-12-10

## 2018-12-10 DIAGNOSIS — Z17.1 MALIGNANT NEOPLASM OF UPPER-OUTER QUADRANT OF LEFT BREAST IN FEMALE, ESTROGEN RECEPTOR NEGATIVE (HCC): ICD-10-CM

## 2018-12-10 DIAGNOSIS — I97.2 POSTMASTECTOMY LYMPHEDEMA SYNDROME: Primary | ICD-10-CM

## 2018-12-10 DIAGNOSIS — C50.412 MALIGNANT NEOPLASM OF UPPER-OUTER QUADRANT OF LEFT BREAST IN FEMALE, ESTROGEN RECEPTOR NEGATIVE (HCC): ICD-10-CM

## 2018-12-10 PROCEDURE — 97535 SELF CARE MNGMENT TRAINING: CPT | Performed by: PHYSICAL THERAPIST

## 2018-12-10 PROCEDURE — 97164 PT RE-EVAL EST PLAN CARE: CPT | Performed by: PHYSICAL THERAPIST

## 2018-12-10 PROCEDURE — G8985 CARRY GOAL STATUS: HCPCS | Performed by: PHYSICAL THERAPIST

## 2018-12-10 PROCEDURE — G8984 CARRY CURRENT STATUS: HCPCS | Performed by: PHYSICAL THERAPIST

## 2018-12-10 NOTE — THERAPY RE-EVALUATION
Physical Therapy Lymphedema Re-Evaluation  [facility]     Patient Name: America Crews  : 1950  MRN: [id number]  Today's Date: 12/10/2018      Visit Date: 12/10/2018    Visit Dx:    ICD-10-CM ICD-9-CM   1. 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 Malignant neoplasm of upper-outer quadrant of left breast in female, estrogen receptor negative (CMS/HCC) C50.412 174.4    Z17.1 V86.1       Patient Active Problem List   Diagnosis   • Breast cancer (CMS/HCC)   • Lymphedema of upper extremity   • Medicare annual wellness visit, subsequent        Past Medical History:   Diagnosis Date   • Breast cancer (CMS/HCC)     Left   • Fibroids    • H/O abnormal mammogram     with biopsy showing lobular carcinoma in situ   • H/O left breast biopsy    • Hx of bilateral mastectomy    • Ovarian cysts    • Skin cancer     Nose        Past Surgical History:   Procedure Laterality Date   • BREAST BIOPSY Left    • BREAST BIOPSY Left    • BREAST BIOPSY Right    • BREAST SURGERY Bilateral     lobular carcinoma in situ  excision   • HYSTEROSCOPY     • MASTECTOMY Bilateral        Visit Dx:    ICD-10-CM ICD-9-CM   1. 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 Malignant neoplasm of upper-outer quadrant of left breast in female, estrogen receptor negative (CMS/HCC) C50.412 174.4    Z17.1 V86.1           Lymphedema     Row Name 12/10/18 0900             Subjective Pain    Able to rate subjective pain?  yes  -SC      Pre-Treatment Pain Level  0  -SC         Subjective Comments    Subjective Comments  I am doing well. I have increased swelling, mostly in the hand with increased activities like raking leaves. But otherwise doing well. I hadn't been using my pump but I got it back out and have been using it again. I do use the kinesiotape at all times. That really helps. I sleep in the night garment. I have had it 9 to 10 years so I do think it is time for a new one.   -SC          General ROM    GENERAL ROM COMMENTS  WNL  -SC         MMT (Manual Muscle Testing)    General MMT Comments  WNL  -SC         Lymphedema Edema Assessment    Ptting Edema Category  By grade out of 4  -SC      Pitting Edema  + 1/4 (+1 of 4);Mild  -SC      Edema Assessment Comment  soft, non fibrotic edema grossly left UE, stable, negative into chest wall  -SC         Skin Changes/Observations    Skin Observations Comment  intact  -SC         Lymphedema Measurements    Measurement Type(s)  Circumferential  -SC      Circumferential Areas  Upper extremities  -SC      Lymphedema Measurements Comments  with KT tape donned  -SC         BUE Circumferential (cm)    Measurement Location 1  axilla  -SC      Left 1  30 cm  -SC      Measurement Location 2  15 cm above elbow  -SC      Left 2  29 cm  -SC      Measurement Location 3  10 cm above elbow  -SC      Left 3  30.5 cm  -SC      Measurement Location 4  5 cm above elbow  -SC      Left 4  28.5 cm  -SC      Measurement Location 5  elbow  -SC      Left 5  27 cm  -SC      Measurement Location 6  5 cm below elbow  -SC      Left 6  29 cm  -SC      Measurement Location 7  10 cm below elbow  -SC      Left 7  27.3 cm  -SC      Measurement Location 8  15 cm below elbow  -SC      Left 8  24 cm  -SC      Measurement Location 9  20 cm below elbow  -SC      Left 9  20.5 cm  -SC      Measurement Location 10  wrist  -SC      Left 10  18.5 cm  -SC      Measurement Location 11  midpalm  -SC      Left 11  19.5 cm  -SC      Measurement Location 12  MCPs  -SC      Left 12  18.5 cm  -SC      Measurement Location 13  fingers net total  -SC      Measurement Location 14  net total L UE  -SC      Measurement Location 15  net decrease since initial evaluation  -SC      LUE Circumferential Total  302.3 cm  -SC         Compression/Skin Care    Compression/Skin Care Comments  presents with kinesiotape donned, in depth education of night garment options  -SC        User Key  (r) = Recorded By, (t) = Taken  By, (c) = Cosigned By    Initials Name Provider Type    Danika Peter PT Physical Therapist                          Therapy Education  Education Details: in depth education with demonstration of different makes and models in clinic and online of night compression garment options with discussion of fit, quality, effectiveness, length of use, etc.   Given: Symptoms/condition management, Edema management, Bandaging/dressing change, Other (comment)  Program: Progressed  How Provided: Verbal, Demonstration, Written  Provided to: Patient  Level of Understanding: Teach back education performed, Verbalized, Demonstrated      Exercises     Row Name 12/10/18 0900             Subjective Comments    Subjective Comments  I am doing well. I have increased swelling, mostly in the hand with increased activities like raking leaves. But otherwise doing well. I hadn't been using my pump but I got it back out and have been using it again. I do use the kinesiotape at all times. That really helps. I sleep in the night garment. I have had it 9 to 10 years so I do think it is time for a new one.   -SC         Subjective Pain    Able to rate subjective pain?  yes  -SC      Pre-Treatment Pain Level  0  -SC        User Key  (r) = Recorded By, (t) = Taken By, (c) = Cosigned By    Initials Name Provider Type    Danika Peter PT Physical Therapist                        PT OP Goals     Row Name 12/10/18 0900          PT Short Term Goals    STG 1  Patient independent and compliant with initial home exercise program focused on diaphragmatic breathing, range of motion, flexibility to decrease edema and improve lymphatic flow for decreased edema and decreased risk of infection.   -SC     STG 1 Progress  Met  -SC     STG 2  Patient demonstrate proper awareness of What is Lymphedema and 18 Steps of Prevention for improved prevention, management, care of symptoms and ease of transition to self-care of condition.   -SC     STG 2  Progress  Met  -SC     STG 3  Patient independent and compliant with self-wrapping techniques of compression bandages with spouse/family member as indicated for improved self-management of condition.   -SC     STG 3 Progress  Goal Revised compliant but unable to wrap at home due to lives alone  -SC     STG 4  Patient demonstrate decreased net edema of left upper extremity >/= 15 cm for decrease in edema, symptoms, decreased risk of infection and improved skin care/transition to self-care of condition.   -SC     STG 4 Progress  Met  -SC        Long Term Goals    LTG Date to Achieve  06/10/19  -SC     LTG 1  Patient independent and compliant with advanced Home Exercise Program for self-management of condition.   -SC     LTG 1 Progress  Met  -SC     LTG 2  Patient independent and compliant with compression garments as indicated for self-management of condition.   -SC     LTG 2 Progress  Met  -SC     LTG 3  Patient independent and compliant with home pneumatic compression pump for transition to self-care of condition.  -SC     LTG 3 Progress  Met  -SC     LTG 4  Patient demonstrate decreased net edema of left upper extremity >/= 30 cm for decrease in edema, symptoms, decreased risk of infection and improved skin care/transition to self-care of condition.   -SC     LTG 4 Progress  Met  -SC     LTG 5  Patient independent and compliant with custom or OTS night compression garment for the L UE to return to self-care of condition with decreased risk of progression of lymphedema or infection  -SC     LTG 5 Progress  New  -SC        Time Calculation    PT Goal Re-Cert Due Date  06/10/19  -SC       User Key  (r) = Recorded By, (t) = Taken By, (c) = Cosigned By    Initials Name Provider Type    Danika Peter PT Physical Therapist          PT Assessment/Plan     Row Name 12/10/18 0900          PT Assessment    Impairments  Impaired lymphatic circulation;Edema  -SC     Assessment Comments  Mrs. Crews returns for 6  month f/u reassessment of her chronic post mastectomy lymphedema syndrome L UE axilla to fingers. She presents with kinesiotape donned correctly left UE. She does have compression tank tops but was not wearing one currently. She reports improved compliance with home pneumatic compression pump and night compression garment. At this time her night garment is wearing out as it is between 9-10 years old. We went over all different types of compression garments for night use today and discuss effectiveness of garment with effectiveness of cost. She will return next week with all of her compression garments for me to check and asses and we will fit and order a new garment depending on appropriateness.   -SC        PT Plan    PT Frequency  Other (comment)  -SC     Predicted Duration of Therapy Intervention (Therapy Eval)  return next week for compression fitting then continue back at 6 month reassessments unless otherwise indicated  -SC     PT Plan Comments  fit for night compression garment  -SC       User Key  (r) = Recorded By, (t) = Taken By, (c) = Cosigned By    Initials Name Provider Type    SC Danika Hutchins, PT Physical Therapist                       Time Calculation:   Start Time: 0900  Stop Time: 0940  Time Calculation (min): 40 min   Therapy Suggested Charges     Code   Minutes Charges    None           Therapy Charges for Today     Code Description Service Date Service Provider Modifiers Qty    32121774274  PT CARRY MOV HAND OBJ CURRENT 12/10/2018 Danika Hutchins, PT GP, CI 1    26015710174 HC PT CARRY MOV HAND OBJ PROJECTED 12/10/2018 Danika Hutchins PT GP, CI 1    [id number] HC PT RE-EVAL ESTABLISHED PLAN 2 12/10/2018 Danika Hutchins, PT GP 1    [id number]  PT SELF CARE/MGMT/TRAIN EA 15 MIN 12/10/2018 Danika Hutchins PT GP 1          PT G-Codes  PT Professional Judgement Used?: Yes  Functional Limitation: Carrying, moving and handling objects  Carrying, Moving and Handling  Objects Current Status (): At least 1 percent but less than 20 percent impaired, limited or restricted  Carrying, Moving and Handling Objects Goal Status (): At least 1 percent but less than 20 percent impaired, limited or restricted         Danika Renee, PT  12/10/2018

## 2018-12-18 ENCOUNTER — HOSPITAL ENCOUNTER (OUTPATIENT)
Dept: PHYSICAL THERAPY | Facility: HOSPITAL | Age: 68
Setting detail: THERAPIES SERIES
Discharge: HOME | End: 2018-12-18

## 2018-12-18 DIAGNOSIS — C50.412 MALIGNANT NEOPLASM OF UPPER-OUTER QUADRANT OF LEFT BREAST IN FEMALE, ESTROGEN RECEPTOR NEGATIVE (HCC): ICD-10-CM

## 2018-12-18 DIAGNOSIS — I89.0 LYMPHEDEMA OF UPPER EXTREMITY: ICD-10-CM

## 2018-12-18 DIAGNOSIS — Z17.1 MALIGNANT NEOPLASM OF UPPER-OUTER QUADRANT OF LEFT BREAST IN FEMALE, ESTROGEN RECEPTOR NEGATIVE (HCC): ICD-10-CM

## 2018-12-18 DIAGNOSIS — I97.2 POSTMASTECTOMY LYMPHEDEMA SYNDROME: Primary | ICD-10-CM

## 2018-12-18 PROCEDURE — 97110 THERAPEUTIC EXERCISES: CPT | Performed by: PHYSICAL THERAPIST

## 2018-12-18 PROCEDURE — 97535 SELF CARE MNGMENT TRAINING: CPT | Performed by: PHYSICAL THERAPIST

## 2018-12-18 NOTE — THERAPY TREATMENT NOTE
Outpatient Physical Therapy Lymphedema Treatment Note  [facility]     Patient Name: America Crews  : 1950  MRN: [id number]  Today's Date: 2018        Visit Date: 2018    Visit Dx:    ICD-10-CM ICD-9-CM   1. 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 Malignant neoplasm of upper-outer quadrant of left breast in female, estrogen receptor negative (CMS/HCC) C50.412 174.4    Z17.1 V86.1   3. Lymphedema of upper extremity I89.0 457.1       Patient Active Problem List   Diagnosis   • Breast cancer (CMS/[facility])   • Lymphedema of upper extremity   • Medicare annual wellness visit, subsequent        Lymphedema     Row Name 18 0812             Subjective Pain    Able to rate subjective pain?  yes  -SC      Pre-Treatment Pain Level  0  -SC         Subjective Comments    Subjective Comments  I brought in my night garments so you could check them out. I tried to look online to find these garments. I really couldn't find pricing. I think I have had these 9 years.   -SC         Lymphedema Measurements    Lymphedema Measurements Comments  fitting completed for new OTS night garment, [facility]  -SC         Compression/Skin Care    Compression/Skin Care Comments  education of different night garment options and pricing  -SC        User Key  (r) = Recorded By, (t) = Taken By, (c) = Cosigned By    Initials Name Provider Type    Danika Peter, PT Physical Therapist                        PT Assessment/Plan     Row Name 18 0322          PT Assessment    Assessment Comments  fitting completed for OTS night compression garment options. Patient to order, return with fitted garment to determine proper fit and return to self-care of condition.   -SC        PT Plan    PT Plan Comments  assess garment  -SC       User Key  (r) = Recorded By, (t) = Taken By, (c) = Cosigned By    Initials Name Provider Type    Danika Peter, PT Physical Therapist                Exercises     Row Name 12/18/18 0847             Subjective Comments    Subjective Comments  I brought in my night garments so you could check them out. I tried to look online to find these garments. I really couldn't find pricing. I think I have had these 9 years.   -SC         Subjective Pain    Able to rate subjective pain?  yes  -SC      Pre-Treatment Pain Level  0  -SC        User Key  (r) = Recorded By, (t) = Taken By, (c) = Cosigned By    Initials Name Provider Type    Danika Peter, PT Physical Therapist                        PT OP Goals     Row Name 12/18/18 0847          PT Short Term Goals    STG 1  Patient independent and compliant with initial home exercise program focused on diaphragmatic breathing, range of motion, flexibility to decrease edema and improve lymphatic flow for decreased edema and decreased risk of infection.   -SC     STG 1 Progress  Met  -SC     STG 2  Patient demonstrate proper awareness of What is Lymphedema and 18 Steps of Prevention for improved prevention, management, care of symptoms and ease of transition to self-care of condition.   -SC     STG 2 Progress  Met  -SC     STG 3  Patient independent and compliant with self-wrapping techniques of compression bandages with spouse/family member as indicated for improved self-management of condition.   -SC     STG 3 Progress  Goal Revised compliant but unable to wrap at home due to lives alone  -SC     STG 4  Patient demonstrate decreased net edema of left upper extremity >/= 15 cm for decrease in edema, symptoms, decreased risk of infection and improved skin care/transition to self-care of condition.   -SC     STG 4 Progress  Met  -SC        Long Term Goals    LTG Date to Achieve  06/10/19  -SC     LTG 1  Patient independent and compliant with advanced Home Exercise Program for self-management of condition.   -SC     LTG 1 Progress  Met  -SC     LTG 2  Patient independent and compliant with compression garments as  indicated for self-management of condition.   -SC     LTG 2 Progress  Met  -SC     LTG 3  Patient independent and compliant with home pneumatic compression pump for transition to self-care of condition.  -SC     LTG 3 Progress  Met  -SC     LTG 4  Patient demonstrate decreased net edema of left upper extremity >/= 30 cm for decrease in edema, symptoms, decreased risk of infection and improved skin care/transition to self-care of condition.   -SC     LTG 4 Progress  Met  -SC     LTG 5  Patient independent and compliant with custom or OTS night compression garment for the L UE to return to self-care of condition with decreased risk of progression of lymphedema or infection  -SC     LTG 5 Progress  Ongoing  -SC     LTG 5 Progress Comments  measurements obtained today, due to OTS and pricing, will email patient and she will order, to return to determine proper fit  -SC        Time Calculation    PT Goal Re-Cert Due Date  06/10/19  -SC       User Key  (r) = Recorded By, (t) = Taken By, (c) = Cosigned By    Initials Name Provider Type    SC Danika Hutchins, PT Physical Therapist          Therapy Education  Education Details: night compression garment options, pricing and most effective for patient's lymphedema management  Given: Symptoms/condition management, Edema management, Other (comment)  Program: New  How Provided: Verbal, Demonstration, Written  Provided to: Patient  Level of Understanding: Teach back education performed, Verbalized, Demonstrated              Time Calculation:   Start Time: 0847  Stop Time: 0911  Time Calculation (min): 24 min   Therapy Suggested Charges     Code   Minutes Charges    None           Therapy Charges for Today     Code Description Service Date Service Provider Modifiers Qty    [id number]  PT THER PROC EA 15 MIN 12/18/2018 Danika Hutchins, PT GP 1    [id number]  PT SELF CARE/MGMT/TRAIN EA 15 MIN 12/18/2018 Danika Hutchins PT GP 1                    Danika HANEY  Thelma, PT  12/18/2018

## 2019-06-19 ENCOUNTER — APPOINTMENT (OUTPATIENT)
Dept: LAB | Facility: HOSPITAL | Age: 69
End: 2019-06-19

## 2019-06-19 ENCOUNTER — APPOINTMENT (OUTPATIENT)
Dept: ONCOLOGY | Facility: CLINIC | Age: 69
End: 2019-06-19

## 2019-06-19 NOTE — PROGRESS NOTES
Subjective     REASONS FOR FOLLOWUP:     1. T4N3, ER/PA negative, HER2 positive breast cancer; received dose-dense Adriamycin and Cytoxan and Taxol as well as Herceptin and radiation therapy, further total of a year of Herceptin. Completed March of 2011.  2. Mild decrease in EF although still within the normal range felt to be insignificant.   3. Chronic cough with negative CT scans and normal PFTs in 06/2010 and 12/2010.   4. Genetic testing deferred due to cost.   5. Left axillary skin lesion showing atypical vascular proliferation related to radiation. No signs of overt malignancy.   :     History of Present Illness  patient is a 68 y.o. female with a high-risk ER/PA negative HER-2 positive left breast cancer T4 N 3 treated with adjuvant dose dense Adriamycin Cytoxan and Taxol Herceptin and radiation COMPLETED 8 years ago.    Last year she was seen with complaint of a tender nodule in the chest which was felt to be bony prominence after weight loss.  A CAT scan was done which was thankfully benign.  She is gone on a special diet and is lost an additional 20 pounds and feels fine and has no complaints.  Her left arm lymphedema remains a big issue and she is going to the clinic in getting it wrapped periodically    She is  up-to-date for colonoscopy.    She has reached a 10-year khalif since diagnosis and I think we can release her from our care but I would like to re-send her for genetic testing as the cost is dropped significantly and also the indications of changed and because her father had pancreatic cancer in his 50s I think testing is important for her    Active Ambulatory Problems     Diagnosis Date Noted   • Breast cancer (CMS/HCC) 06/07/2016   • Lymphedema of upper extremity 06/13/2017   • Medicare annual wellness visit, subsequent 06/13/2017     Resolved Ambulatory Problems     Diagnosis Date Noted   • No Resolved Ambulatory Problems     Past Medical History:   Diagnosis Date   • Breast cancer (CMS/HCC)  2009   • Fibroids    • H/O abnormal mammogram 1990   • H/O left breast biopsy 1990   • Hx of bilateral mastectomy 2009   • Ovarian cysts    • Skin cancer      Past Surgical History:   Procedure Laterality Date   • BREAST BIOPSY Left 1990   • BREAST BIOPSY Left 2009   • BREAST BIOPSY Right    • BREAST SURGERY Bilateral 2000    lobular carcinoma in situ 1990 excision   • HYSTEROSCOPY  2000   • MASTECTOMY Bilateral 2009     ONCOLOGIC HISTORY:  The patient had a history of abnormal mammogram in 1990 with biopsy showing lobular carcinoma in situ. Biopsy measured 7 x 5 x 1.5 cm. She had focal atypical lobular hyperplasia with lobular carcinoma in situ and extensive fibrocystic disease with atypi c al duct hyperplasia. At that time no further therapy or preventative options were given, and the patient was followed closely with mammograms. In 2008 she had an MRI of the breast which revealed a mass at the 1 o'clock position posterior to the nipple m e asuring 1.2 x 1.6 cm which was felt to be a fibroadenoma. This was dated 07/31/08. Mammogram in 2008 was benign. The patient then had a routine mammogram on 10/06/09 at [facility] which showed thickening of the skin and two lymph nodes in the l eft axilla. Ultrasound confirmed a mass in the left upper outer quadrant and thickening of the areolar skin and subareolar tissues, plus two lymph nodes felt to be enlarged. The patient had excisional biopsy by Dr. Daniels at [facility] with removal of an ellipse of skin which showed some peau d'orange and infiltrating ductal carcinoma grade 2 with focal vascular space invasion including in the dermis of the segment of skin attached and positive margins. ER/MD negative, HER2/felice positive. The patient the n underwent left modified radical mastectomy with axillary dissection, and a simple mastectomy on the right, on 11/16/09. The right breast showed atypical lobular hyperplasia and three benign nodes. The left breast showed 1.2 cm  area of residual invasive ductal carcinoma grade 3, focal lymphatic invasion of nipple stroma. Margins were clear. There was a fibroadenoma focal lobular carcinoma in situ. Metastatic carcinoma in 8 of 14 lymph nodes with macrometastasis and no extranodal extension. Staging wo rkup including bone scan, CT scans and PET scan, all were negative for metastatic disease.   Decision was made to treat with dose-dense AC followed by weekly Taxol and Herceptin followed by Herceptin for the rest of the year and chest wall radiation. MUGA showed ejection fraction of 72%.   Emend was added with cycle 2 of treatment because of nausea.   Ejection fraction stable at 68% after four cycles of Adriamycin and Cytoxan. Herceptin and Taxol started around 3-04-10.   The patient tolerated the Taxol and Herceptin well.   Because of crackles in her lungs, CT chest was done on 5/15/10 which showed no abnormalities. PFT's were done and showed a mild restrictive defect. MUGA scan 5/21/10 shows stable EF of 65%. Plan to complete chest wall radiation an d q3 week Herceptin for the rest of the year. No hormonal therapy as she is ER/[place] negative.   MUGA scan 8/10 shows EF of 59%. Because it is still not 15 points from her original EF, we are going to repeat the MUGA this time in six weeks instead of 12 weeks and make sure there is no further drop.   MUGA scan 9/17/10 is 57% which is about 15 points from her original EF of 72% which I think is falsely high. I discussed this with Dr. Blade Camargo who agrees that she is still well within the normal range and the o riginal EF is probably falsely high and agrees with continuing Herceptin because of her risk of cardiac disease in this situation and we will follow MUGA scans at eight week intervals.   Repeat MUGA scan dated 11/26/10 showed an ejection fraction of 62% whi ch is up from 57% in September. The patient had a Doppler of the right upper extremity showing evidence of DVT. Lymphedema was felt to be  the cause of the swelling. A colonoscopy was done on 10/22/10 which was normal. Herceptin to continue until 03/20 11.   MUGA scan 01/28/11 was stable with an EF of 60%.   The patient was seen on 12/16/11, doing well. Chest x-ray was benign. She has no cardiac symptoms. Port is to be removed in 2-3 months with followup at four month intervals.   Patient seen 4/12 doing well. Genetic testing was delayed because she has a high deductible and she is waiting until the end of the year. She wants to keep her port in for now and I have no problems with this.   Patient is seen in 02/2013 doing very well. Chest x-ray appears benign and we will have her port removed.   Patient seen by a dermatologist in May 2013 and some lesions in her axilla were biopsied felt to be possibly recurrent breast cancer initially but then were called post radiation changes w ith atypical vascular proliferation. Patient seen in September 2013 doing well with no new skin lesions. Port has been removed. Chest CT was also done in May 2013 which was benign.   Close followup of axillary skin is planned because of the atypical vascular proliferation and the risk of lymphangiosarcoma.   The patient was seen on 03/16/2015 doing well clinically, 5 years from completion of chemotherapy and 4 years from Herceptin; very high risk disease and with close followup planned for the time being.     Review of Systems   Constitutional: Positive for fatigue (6/20/19).   Respiratory: Positive for chest tightness ( Lymphedema in chest). Negative for shortness of breath.    Cardiovascular: Negative for chest pain.   Musculoskeletal: Positive for joint swelling (Lymphedema left arm 6/20/19).   Psychiatric/Behavioral: The patient is not nervous/anxious.       A comprehensive 14 point review of systems was performed and was negative except as mentioned.      Current Outpatient Medications:   •  Unable to find, 1 each 1 (One) Time. Pt is not currently on any medications,  "Disp: , Rfl:       ALLERGIES:  No Known Allergies    Objective      Vitals:    06/20/19 1055   BP: 137/77   Pulse: 71   Resp: 16   Temp: 97.7 °F (36.5 °C)   SpO2: 98%   Weight: 73.6 kg (162 lb 4.8 oz)   Height: 157.3 cm (61.93\")  Comment: new ht w/shoes   PainSc: 0-No pain     Current Status 6/20/2019   ECOG score 0       Physical Exam    GENERAL:  Well-developed, well-nourished in no acute distress.   SKIN:  Warm, dry without rashes, purpura or petechiae.  HEAD:  Normocephalic.  EYES:  Pupils equal, round and reactive to light.  EOMs intact.  Conjunctivae normal.  EARS:  Hearing intact.  NOSE:  Septum midline.  No excoriations or nasal discharge.  MOUTH:  Tongue is well-papillated; no stomatitis or ulcers.  Lips normal.  THROAT:  Oropharynx without lesions or exudates.  NECK:  Supple with good range of motion; no thyromegaly or masses, no JVD.  LYMPHATICS:  No cervical, supraclavicular, axillary or inguinal adenopathy.  CHEST:  Lungs clear to percussion and auscultation. Good airflow.  BREASTS: Bilateral mastectomy scars.  There is a bony prominence near the medial and of her mastectomy scars just beneath one of her radiation tattoos.  This may represent a prominent xiphoid process.  No change  CARDIAC:  Regular rate and rhythm without murmurs, rubs or gallops. Normal S1,S2.  ABDOMEN:  Soft, nontender with no organomegaly or masses.  EXTREMITIES:  No clubbing, cyanosis significant lymphedema in the left arm all the way to the hand  NEUROLOGICAL:  Cranial Nerves II-XII grossly intact.  No focal neurological deficits.  PSYCHIATRIC:  Normal affect and mood.        RECENT LABS:  Hematology WBC   Date Value Ref Range Status   06/20/2019 6.11 3.40 - 10.80 10*3/mm3 Final     RBC   Date Value Ref Range Status   06/20/2019 4.37 3.77 - 5.28 10*6/mm3 Final     Hemoglobin   Date Value Ref Range Status   06/20/2019 13.6 12.0 - 15.9 g/dL Final     Hematocrit   Date Value Ref Range Status   06/20/2019 40.2 34.0 - 46.6 % Final "     Platelets   Date Value Ref Range Status   06/20/2019 156 140 - 450 10*3/mm3 Final          CT CHEST  FINDINGS: There is more prominent groundglass density and scarring at  the anterior aspect of the left apical region. There are no suspicious  pulmonary opacities or nodules. There are no pleural or pericardial  effusions. There is no lymphadenopathy within the chest. Aberrant origin  of the right subclavian artery is noted. There is  fine honeycombing at  the posterior lung bases which appear stable. There is no change in the  appearance of the anterior chest wall with postsurgical scarring. No  anterior chest wall lesion is seen. No suspicious bone lesion is seen.  No acute abnormality is seen in the visualized upper abdomen     IMPRESSION:  More prominent radiation pneumonitis and scarring at the  left apical region. There is no evidence for metastatic disease and  there is otherwise no new abnormality.     This report was finalized on 8/8/2017         Assessment/Plan   1.  T4 N3 ER/LA negative HER-2 positive breast cancer on the left treated with aggressive Herceptin based   chemotherapy and radiation and surgery. She is now over 6 years out from completion of her treatment.  2.  Lymphedema left arm stable  3.  Atypical vascular proliferation left axilla resolved  4.  Palpable area on the chest wall which may be a prominent xiphoid process.  The patient is concerned that she never felt this before but she has had nearly a 30 pound weight loss which certainly could make this more readily palpable.    Plan  1 refer to genetics  No follow-up is needed unless her genetic testing is positive                  6/20/2019      CC:

## 2019-06-20 ENCOUNTER — OFFICE VISIT (OUTPATIENT)
Dept: ONCOLOGY | Facility: CLINIC | Age: 69
End: 2019-06-20

## 2019-06-20 ENCOUNTER — LAB (OUTPATIENT)
Dept: LAB | Facility: HOSPITAL | Age: 69
End: 2019-06-20

## 2019-06-20 VITALS
DIASTOLIC BLOOD PRESSURE: 77 MMHG | RESPIRATION RATE: 16 BRPM | WEIGHT: 162.3 LBS | HEIGHT: 62 IN | BODY MASS INDEX: 29.87 KG/M2 | HEART RATE: 71 BPM | TEMPERATURE: 97.7 F | SYSTOLIC BLOOD PRESSURE: 137 MMHG | OXYGEN SATURATION: 98 %

## 2019-06-20 DIAGNOSIS — Z17.1 MALIGNANT NEOPLASM OF UPPER-OUTER QUADRANT OF LEFT BREAST IN FEMALE, ESTROGEN RECEPTOR NEGATIVE (HCC): Primary | ICD-10-CM

## 2019-06-20 DIAGNOSIS — C50.412 MALIGNANT NEOPLASM OF UPPER-OUTER QUADRANT OF LEFT BREAST IN FEMALE, ESTROGEN RECEPTOR NEGATIVE (HCC): Primary | ICD-10-CM

## 2019-06-20 DIAGNOSIS — C50.412 MALIGNANT NEOPLASM OF UPPER-OUTER QUADRANT OF LEFT FEMALE BREAST, UNSPECIFIED ESTROGEN RECEPTOR STATUS (HCC): Primary | ICD-10-CM

## 2019-06-20 LAB
ALBUMIN SERPL-MCNC: 4.2 G/DL (ref 3.5–5.2)
ALBUMIN/GLOB SERPL: 1.4 G/DL (ref 1.1–2.4)
ALP SERPL-CCNC: 108 U/L (ref 38–116)
ALT SERPL W P-5'-P-CCNC: 17 U/L (ref 0–33)
ANION GAP SERPL CALCULATED.3IONS-SCNC: 11.8 MMOL/L
AST SERPL-CCNC: 17 U/L (ref 0–32)
BASOPHILS # BLD AUTO: 0.04 10*3/MM3 (ref 0–0.2)
BASOPHILS NFR BLD AUTO: 0.7 % (ref 0–1.5)
BILIRUB SERPL-MCNC: 0.4 MG/DL (ref 0.2–1.2)
BUN BLD-MCNC: 14 MG/DL (ref 6–20)
BUN/CREAT SERPL: 19.7 (ref 7.3–30)
CALCIUM SPEC-SCNC: 9 MG/DL (ref 8.5–10.2)
CHLORIDE SERPL-SCNC: 100 MMOL/L (ref 98–107)
CO2 SERPL-SCNC: 26.2 MMOL/L (ref 22–29)
CREAT BLD-MCNC: 0.71 MG/DL (ref 0.6–1.1)
DEPRECATED RDW RBC AUTO: 43.5 FL (ref 37–54)
EOSINOPHIL # BLD AUTO: 0.12 10*3/MM3 (ref 0–0.4)
EOSINOPHIL NFR BLD AUTO: 2 % (ref 0.3–6.2)
ERYTHROCYTE [DISTWIDTH] IN BLOOD BY AUTOMATED COUNT: 12.9 % (ref 12.3–15.4)
GFR SERPL CREATININE-BSD FRML MDRD: 81 ML/MIN/1.73
GLOBULIN UR ELPH-MCNC: 2.9 GM/DL (ref 1.8–3.5)
GLUCOSE BLD-MCNC: 94 MG/DL (ref 74–124)
HCT VFR BLD AUTO: 40.2 % (ref 34–46.6)
HGB BLD-MCNC: 13.6 G/DL (ref 12–15.9)
IMM GRANULOCYTES # BLD AUTO: 0.01 10*3/MM3 (ref 0–0.05)
IMM GRANULOCYTES NFR BLD AUTO: 0.2 % (ref 0–0.5)
LYMPHOCYTES # BLD AUTO: 2.04 10*3/MM3 (ref 0.7–3.1)
LYMPHOCYTES NFR BLD AUTO: 33.4 % (ref 19.6–45.3)
MCH RBC QN AUTO: 31.1 PG (ref 26.6–33)
MCHC RBC AUTO-ENTMCNC: 33.8 G/DL (ref 31.5–35.7)
MCV RBC AUTO: 92 FL (ref 79–97)
MONOCYTES # BLD AUTO: 0.3 10*3/MM3 (ref 0.1–0.9)
MONOCYTES NFR BLD AUTO: 4.9 % (ref 5–12)
NEUTROPHILS # BLD AUTO: 3.6 10*3/MM3 (ref 1.7–7)
NEUTROPHILS NFR BLD AUTO: 58.8 % (ref 42.7–76)
NRBC BLD AUTO-RTO: 0 /100 WBC (ref 0–0.2)
PLATELET # BLD AUTO: 156 10*3/MM3 (ref 140–450)
PMV BLD AUTO: 9.9 FL (ref 6–12)
POTASSIUM BLD-SCNC: 4 MMOL/L (ref 3.5–4.7)
PROT SERPL-MCNC: 7.1 G/DL (ref 6.3–8)
RBC # BLD AUTO: 4.37 10*6/MM3 (ref 3.77–5.28)
SODIUM BLD-SCNC: 138 MMOL/L (ref 134–145)
WBC NRBC COR # BLD: 6.11 10*3/MM3 (ref 3.4–10.8)

## 2019-06-20 PROCEDURE — 99214 OFFICE O/P EST MOD 30 MIN: CPT | Performed by: INTERNAL MEDICINE

## 2019-06-20 PROCEDURE — 80053 COMPREHEN METABOLIC PANEL: CPT | Performed by: INTERNAL MEDICINE

## 2019-06-20 PROCEDURE — 36415 COLL VENOUS BLD VENIPUNCTURE: CPT | Performed by: INTERNAL MEDICINE

## 2019-06-20 PROCEDURE — 85025 COMPLETE CBC W/AUTO DIFF WBC: CPT | Performed by: INTERNAL MEDICINE

## 2019-07-30 ENCOUNTER — CLINICAL SUPPORT (OUTPATIENT)
Dept: OTHER | Facility: HOSPITAL | Age: 69
End: 2019-07-30

## 2019-07-30 DIAGNOSIS — C50.412 MALIGNANT NEOPLASM OF UPPER-OUTER QUADRANT OF LEFT BREAST IN FEMALE, ESTROGEN RECEPTOR NEGATIVE (HCC): Primary | ICD-10-CM

## 2019-07-30 DIAGNOSIS — Z80.3 FAMILY HISTORY OF MALIGNANT NEOPLASM OF BREAST: ICD-10-CM

## 2019-07-30 DIAGNOSIS — Z17.1 MALIGNANT NEOPLASM OF UPPER-OUTER QUADRANT OF LEFT BREAST IN FEMALE, ESTROGEN RECEPTOR NEGATIVE (HCC): Primary | ICD-10-CM

## 2019-07-30 PROCEDURE — G0463 HOSPITAL OUTPT CLINIC VISIT: HCPCS

## 2019-07-30 NOTE — PATIENT INSTRUCTIONS
Pt seen by Dr. Ramos for genetic counseling and testing. Lab drawn with 21g butterfly needle in Right x 1 attempt. Sent to [facility]. Pt informed she would receive a phone call when test results.

## 2019-10-31 ENCOUNTER — DOCUMENTATION (OUTPATIENT)
Dept: PHYSICAL THERAPY | Facility: HOSPITAL | Age: 69
End: 2019-10-31

## 2019-10-31 DIAGNOSIS — I97.2 POSTMASTECTOMY LYMPHEDEMA SYNDROME: Primary | ICD-10-CM

## 2019-10-31 DIAGNOSIS — Z17.1 MALIGNANT NEOPLASM OF UPPER-OUTER QUADRANT OF LEFT BREAST IN FEMALE, ESTROGEN RECEPTOR NEGATIVE (HCC): ICD-10-CM

## 2019-10-31 DIAGNOSIS — C50.412 MALIGNANT NEOPLASM OF UPPER-OUTER QUADRANT OF LEFT BREAST IN FEMALE, ESTROGEN RECEPTOR NEGATIVE (HCC): ICD-10-CM

## 2019-10-31 DIAGNOSIS — I89.0 LYMPHEDEMA OF UPPER EXTREMITY: ICD-10-CM

## 2019-10-31 DIAGNOSIS — Z00.00 MEDICARE ANNUAL WELLNESS VISIT, SUBSEQUENT: ICD-10-CM

## 2019-10-31 NOTE — THERAPY DISCHARGE NOTE
Outpatient Physical Therapy Discharge Summary         Patient Name: America Crews  : 1950  MRN: [id number]    Today's Date: 10/31/2019    Visit Dx:    ICD-10-CM ICD-9-CM   1. 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 Malignant neoplasm of upper-outer quadrant of left breast in female, estrogen receptor negative (CMS/[facility]) C50.412 174.4    Z17.1 V86.1   3. Lymphedema of upper extremity I89.0 457.1   4. Medicare annual wellness visit, subsequent Z00.00 V70.0           OP PT Discharge Summary  Date of Discharge: 10/31/19  Reason for Discharge: All goals achieved, Maximum functional potential achieved, Independent  Outcomes Achieved: Able to achieve all goals within established timeline  Discharge Destination: Home with home program  Discharge Instructions/Additional Comments: to contact me in the future if indicated        Danika Renee, PT  10/31/2019

## 2020-02-18 ENCOUNTER — OFFICE VISIT (OUTPATIENT)
Dept: INTERNAL MEDICINE | Facility: CLINIC | Age: 70
End: 2020-02-18

## 2020-02-18 VITALS
WEIGHT: 161.1 LBS | TEMPERATURE: 97.4 F | DIASTOLIC BLOOD PRESSURE: 76 MMHG | HEIGHT: 62 IN | SYSTOLIC BLOOD PRESSURE: 114 MMHG | HEART RATE: 71 BPM | BODY MASS INDEX: 29.64 KG/M2 | OXYGEN SATURATION: 99 %

## 2020-02-18 DIAGNOSIS — J40 BRONCHITIS: Primary | ICD-10-CM

## 2020-02-18 PROCEDURE — 99213 OFFICE O/P EST LOW 20 MIN: CPT | Performed by: FAMILY MEDICINE

## 2020-02-18 NOTE — PROGRESS NOTES
America Crews is a 69 y.o. female.      Assessment/Plan   Problem List Items Addressed This Visit        Respiratory    Bronchitis - Primary         Amoxicillin    Return if symptoms worsen or fail to improve, for Recheck, Next scheduled follow up.      Chief Complaint   Patient presents with   • [facility] follow up to upper respirator     Social History     Tobacco Use   • Smoking status: Never Smoker   • Smokeless tobacco: Never Used   Substance Use Topics   • Alcohol use: Yes     Comment: Seldom   • Drug use: Defer       History of Present Illness   Patient follow-up appointment after being seen in urgent care treated with over-the-counter remedies still has some head congestion has had recent low-grade fever with nasal discharge no chest pain or shortness of breath no worsening cough or productive cough  The following portions of the patient's history were reviewed and updated as appropriate:PMHroutine: Social history , Allergies, Current Medications, Active Problem List and Health Maintenance    Review of Systems   Constitutional: Positive for fatigue. Negative for activity change and unexpected weight change.   HENT: Positive for congestion, postnasal drip and sore throat. Negative for ear pain.    Eyes: Negative for pain and discharge.   Respiratory: Positive for cough. Negative for chest tightness, shortness of breath and wheezing.    Cardiovascular: Negative for chest pain and palpitations.   Gastrointestinal: Negative for abdominal pain, diarrhea and vomiting.   Endocrine: Negative.    Genitourinary: Negative.    Musculoskeletal: Negative for joint swelling.   Skin: Negative for color change, rash and wound.   Allergic/Immunologic: Negative.    Neurological: Negative for seizures and syncope.   Psychiatric/Behavioral: Negative.        Objective   Vitals:    02/18/20 1248   BP: 114/76   BP Location: Right arm   Patient Position: Sitting   Cuff Size: Large Adult   Pulse: 71   Temp:  "97.4 °F (36.3 °C)   TempSrc: Oral   SpO2: 99%   Weight: 73.1 kg (161 lb 1.6 oz)   Height: 157.3 cm (61.93\")     Body mass index is 29.53 kg/m².  Physical Exam   Constitutional: She is oriented to person, place, and time. She appears well-developed and well-nourished.  Non-toxic appearance. No distress.   HENT:   Head: Normocephalic and atraumatic. Hair is normal.   Right Ear: External ear normal. No drainage, swelling or tenderness. Tympanic membrane is retracted.   Left Ear: External ear normal. No drainage, swelling or tenderness. Tympanic membrane is retracted.   Nose: Mucosal edema present. No epistaxis.   Mouth/Throat: Uvula is midline and mucous membranes are normal. No oral lesions. No uvula swelling. Posterior oropharyngeal erythema present. No oropharyngeal exudate.   Eyes: Pupils are equal, round, and reactive to light. Conjunctivae and EOM are normal. Right eye exhibits no discharge. Left eye exhibits no discharge. No scleral icterus.   Neck: Normal range of motion. Neck supple.   Cardiovascular: Normal rate, regular rhythm and normal heart sounds. Exam reveals no gallop.   No murmur heard.  Pulmonary/Chest: Breath sounds normal. No stridor. No respiratory distress. She has no wheezes. She has no rales. She exhibits no tenderness.   Abdominal: Soft. There is no tenderness.   Lymphadenopathy:     She has cervical adenopathy.   Neurological: She is alert and oriented to person, place, and time. She exhibits normal muscle tone.   Skin: Skin is warm and dry. No rash noted. She is not diaphoretic.   Psychiatric: She has a normal mood and affect. Her behavior is normal. Judgment and thought content normal.   Nursing note and vitals reviewed.    Reviewed Data:  No visits with results within 1 Month(s) from this visit.   Latest known visit with results is:   Lab on 06/20/2019   Component Date Value Ref Range Status   • Glucose 06/20/2019 94  74 - 124 mg/dL Final   • BUN 06/20/2019 14  6 - 20 mg/dL Final   • " Creatinine 06/20/2019 0.71  0.60 - 1.10 mg/dL Final   • Sodium 06/20/2019 138  134 - 145 mmol/L Final   • Potassium 06/20/2019 4.0  3.5 - 4.7 mmol/L Final   • Chloride 06/20/2019 100  98 - 107 mmol/L Final   • CO2 06/20/2019 26.2  22.0 - 29.0 mmol/L Final   • Calcium 06/20/2019 9.0  8.5 - 10.2 mg/dL Final   • Total Protein 06/20/2019 7.1  6.3 - 8.0 g/dL Final   • Albumin 06/20/2019 4.20  3.50 - 5.20 g/dL Final   • ALT (SGPT) 06/20/2019 17  0 - 33 U/L Final   • AST (SGOT) 06/20/2019 17  0 - 32 U/L Final   • Alkaline Phosphatase 06/20/2019 108  38 - 116 U/L Final   • Total Bilirubin 06/20/2019 0.4  0.2 - 1.2 mg/dL Final   • eGFR Non  Amer 06/20/2019 81  >60 mL/min/1.73 Final   • Globulin 06/20/2019 2.9  1.8 - 3.5 gm/dL Final   • A/G Ratio 06/20/2019 1.4  1.1 - 2.4 g/dL Final   • BUN/Creatinine Ratio 06/20/2019 19.7  7.3 - 30.0 Final   • Anion Gap 06/20/2019 11.8  mmol/L Final   • WBC 06/20/2019 6.11  3.40 - 10.80 10*3/mm3 Final   • RBC 06/20/2019 4.37  3.77 - 5.28 10*6/mm3 Final   • Hemoglobin 06/20/2019 13.6  12.0 - 15.9 g/dL Final   • Hematocrit 06/20/2019 40.2  34.0 - 46.6 % Final   • MCV 06/20/2019 92.0  79.0 - 97.0 fL Final   • MCH 06/20/2019 31.1  26.6 - 33.0 pg Final   • MCHC 06/20/2019 33.8  31.5 - 35.7 g/dL Final   • RDW 06/20/2019 12.9  12.3 - 15.4 % Final   • RDW-SD 06/20/2019 43.5  37.0 - 54.0 fl Final   • MPV 06/20/2019 9.9  6.0 - 12.0 fL Final   • Platelets 06/20/2019 156  140 - 450 10*3/mm3 Final   • Neutrophil % 06/20/2019 58.8  42.7 - 76.0 % Final   • Lymphocyte % 06/20/2019 33.4  19.6 - 45.3 % Final   • Monocyte % 06/20/2019 4.9* 5.0 - 12.0 % Final   • Eosinophil % 06/20/2019 2.0  0.3 - 6.2 % Final   • Basophil % 06/20/2019 0.7  0.0 - 1.5 % Final   • Immature Grans % 06/20/2019 0.2  0.0 - 0.5 % Final   • Neutrophils, Absolute 06/20/2019 3.60  1.70 - 7.00 10*3/mm3 Final   • Lymphocytes, Absolute 06/20/2019 2.04  0.70 - 3.10 10*3/mm3 Final   • Monocytes, Absolute 06/20/2019 0.30  0.10 - 0.90  10*3/mm3 Final   • Eosinophils, Absolute 06/20/2019 0.12  0.00 - 0.40 10*3/mm3 Final   • Basophils, Absolute 06/20/2019 0.04  0.00 - 0.20 10*3/mm3 Final   • Immature Grans, Absolute 06/20/2019 0.01  0.00 - 0.05 10*3/mm3 Final   • nRBC 06/20/2019 0.0  0.0 - 0.2 /100 WBC Final

## 2020-03-12 ENCOUNTER — TELEPHONE (OUTPATIENT)
Dept: INTERNAL MEDICINE | Facility: CLINIC | Age: 70
End: 2020-03-12

## 2020-03-12 DIAGNOSIS — I89.0 LYMPHEDEMA OF UPPER EXTREMITY: Primary | ICD-10-CM

## 2020-03-12 NOTE — TELEPHONE ENCOUNTER
Patient stated it is for her lymphedema.  She would like for this to be sent to [facility] physical therapy.  Please advise.

## 2020-03-12 NOTE — TELEPHONE ENCOUNTER
Patient called stating that she needs an order to [facility] (ph: [phone]  Fax: [phone]) for physical therapy since it has been over a year since she has had physical therapy.  Please advise.

## 2020-04-13 ENCOUNTER — APPOINTMENT (OUTPATIENT)
Dept: PHYSICAL THERAPY | Facility: HOSPITAL | Age: 70
End: 2020-04-13

## 2020-05-08 ENCOUNTER — TELEPHONE (OUTPATIENT)
Dept: CASE MANAGEMENT | Facility: OTHER | Age: 70
End: 2020-05-08

## 2020-05-08 NOTE — TELEPHONE ENCOUNTER
Called pt to schedule subsequent medicare wellness visit. No answer, left voicemail message containing RN-ACM contact information, [phone]

## 2020-06-16 ENCOUNTER — OFFICE VISIT (OUTPATIENT)
Dept: INTERNAL MEDICINE | Facility: CLINIC | Age: 70
End: 2020-06-16

## 2020-06-16 ENCOUNTER — LAB (OUTPATIENT)
Dept: LAB | Facility: HOSPITAL | Age: 70
End: 2020-06-16

## 2020-06-16 VITALS
DIASTOLIC BLOOD PRESSURE: 62 MMHG | HEIGHT: 62 IN | WEIGHT: 160.7 LBS | OXYGEN SATURATION: 98 % | SYSTOLIC BLOOD PRESSURE: 142 MMHG | BODY MASS INDEX: 29.57 KG/M2 | HEART RATE: 67 BPM

## 2020-06-16 DIAGNOSIS — Z00.00 MEDICARE ANNUAL WELLNESS VISIT, SUBSEQUENT: Primary | ICD-10-CM

## 2020-06-16 PROBLEM — J40 BRONCHITIS: Status: RESOLVED | Noted: 2020-02-18 | Resolved: 2020-06-16

## 2020-06-16 LAB
ALBUMIN SERPL-MCNC: 4.5 G/DL (ref 3.5–5.2)
ALBUMIN/GLOB SERPL: 1.4 G/DL
ALP SERPL-CCNC: 90 U/L (ref 39–117)
ALT SERPL W P-5'-P-CCNC: 18 U/L (ref 1–33)
ANION GAP SERPL CALCULATED.3IONS-SCNC: 10 MMOL/L (ref 5–15)
AST SERPL-CCNC: 23 U/L (ref 1–32)
BASOPHILS # BLD AUTO: 0.04 10*3/MM3 (ref 0–0.2)
BASOPHILS NFR BLD AUTO: 0.6 % (ref 0–1.5)
BILIRUB SERPL-MCNC: 0.5 MG/DL (ref 0.2–1.2)
BUN BLD-MCNC: 17 MG/DL (ref 8–23)
BUN/CREAT SERPL: 23.6 (ref 7–25)
CALCIUM SPEC-SCNC: 9.8 MG/DL (ref 8.6–10.5)
CHLORIDE SERPL-SCNC: 97 MMOL/L (ref 98–107)
CHOLEST SERPL-MCNC: 166 MG/DL (ref 0–200)
CO2 SERPL-SCNC: 26 MMOL/L (ref 22–29)
CREAT BLD-MCNC: 0.72 MG/DL (ref 0.57–1)
DEPRECATED RDW RBC AUTO: 45.7 FL (ref 37–54)
EOSINOPHIL # BLD AUTO: 0.11 10*3/MM3 (ref 0–0.4)
EOSINOPHIL NFR BLD AUTO: 1.8 % (ref 0.3–6.2)
ERYTHROCYTE [DISTWIDTH] IN BLOOD BY AUTOMATED COUNT: 13.1 % (ref 12.3–15.4)
GFR SERPL CREATININE-BSD FRML MDRD: 80 ML/MIN/1.73
GLOBULIN UR ELPH-MCNC: 3.3 GM/DL
GLUCOSE BLD-MCNC: 90 MG/DL (ref 65–99)
HCT VFR BLD AUTO: 40.7 % (ref 34–46.6)
HDLC SERPL-MCNC: 69 MG/DL (ref 40–60)
HGB BLD-MCNC: 13.3 G/DL (ref 12–15.9)
IMM GRANULOCYTES # BLD AUTO: 0.01 10*3/MM3 (ref 0–0.05)
IMM GRANULOCYTES NFR BLD AUTO: 0.2 % (ref 0–0.5)
LDLC SERPL CALC-MCNC: 84 MG/DL (ref 0–100)
LDLC/HDLC SERPL: 1.22 {RATIO}
LYMPHOCYTES # BLD AUTO: 1.94 10*3/MM3 (ref 0.7–3.1)
LYMPHOCYTES NFR BLD AUTO: 31.3 % (ref 19.6–45.3)
MCH RBC QN AUTO: 30.6 PG (ref 26.6–33)
MCHC RBC AUTO-ENTMCNC: 32.7 G/DL (ref 31.5–35.7)
MCV RBC AUTO: 93.8 FL (ref 79–97)
MONOCYTES # BLD AUTO: 0.37 10*3/MM3 (ref 0.1–0.9)
MONOCYTES NFR BLD AUTO: 6 % (ref 5–12)
NEUTROPHILS # BLD AUTO: 3.73 10*3/MM3 (ref 1.7–7)
NEUTROPHILS NFR BLD AUTO: 60.1 % (ref 42.7–76)
NRBC BLD AUTO-RTO: 0 /100 WBC (ref 0–0.2)
PLATELET # BLD AUTO: 197 10*3/MM3 (ref 140–450)
PMV BLD AUTO: 10.2 FL (ref 6–12)
POTASSIUM BLD-SCNC: 4.1 MMOL/L (ref 3.5–5.2)
PROT SERPL-MCNC: 7.8 G/DL (ref 6–8.5)
RBC # BLD AUTO: 4.34 10*6/MM3 (ref 3.77–5.28)
SODIUM BLD-SCNC: 133 MMOL/L (ref 136–145)
TRIGL SERPL-MCNC: 64 MG/DL (ref 0–150)
VLDLC SERPL-MCNC: 12.8 MG/DL (ref 5–40)
WBC NRBC COR # BLD: 6.2 10*3/MM3 (ref 3.4–10.8)

## 2020-06-16 PROCEDURE — 80053 COMPREHEN METABOLIC PANEL: CPT | Performed by: FAMILY MEDICINE

## 2020-06-16 PROCEDURE — 85025 COMPLETE CBC W/AUTO DIFF WBC: CPT | Performed by: FAMILY MEDICINE

## 2020-06-16 PROCEDURE — 80061 LIPID PANEL: CPT | Performed by: FAMILY MEDICINE

## 2020-06-16 PROCEDURE — G0439 PPPS, SUBSEQ VISIT: HCPCS | Performed by: FAMILY MEDICINE

## 2020-06-16 PROCEDURE — 36415 COLL VENOUS BLD VENIPUNCTURE: CPT | Performed by: FAMILY MEDICINE

## 2020-06-16 NOTE — PROGRESS NOTES
The ABCs of the Annual Wellness Visit  Subsequent Medicare Wellness Visit    Chief Complaint   Patient presents with   • Medicare Wellness-subsequent       Subjective   History of Present Illness:  America Crews is a 69 y.o. female who presents for a Subsequent Medicare Wellness Visit.    HEALTH RISK ASSESSMENT    Recent Hospitalizations:  No hospitalization(s) within the last year.    Current Medical Providers:  Patient Care Team:  Ernesto Hansen MD as PCP - General (Family Medicine)  Tho Ramos MD as PCP - Claims Attributed  Morgan Valdez MD as Referring Physician (Breast Surgery)  Cindy Nettles MD as Consulting Physician (Hematology and Oncology)  Chino Flood MD as Consulting Physician (Hematology and Oncology)    Smoking Status:  Social History     Tobacco Use   Smoking Status Never Smoker   Smokeless Tobacco Never Used       Alcohol Consumption:  Social History     Substance and Sexual Activity   Alcohol Use Yes    Comment: Seldom       Depression Screen:   PHQ-2/PHQ-9 Depression Screening 6/16/2020   Little interest or pleasure in doing things 0   Feeling down, depressed, or hopeless 0   Total Score 0       Fall Risk Screen:  AVINASHADI Fall Risk Assessment was completed, and patient is at LOW risk for falls.Assessment completed on:6/16/2020    Health Habits and Functional and Cognitive Screening:  Functional & Cognitive Status 6/16/2020   Do you have difficulty preparing food and eating? No   Do you have difficulty bathing yourself, getting dressed or grooming yourself? No   Do you have difficulty using the toilet? No   Do you have difficulty moving around from place to place? No   Do you have trouble with steps or getting out of a bed or a chair? No   Current Diet Well Balanced Diet   Dental Exam Up to date   Eye Exam Up to date   Exercise (times per week) 7 times per week   Current Exercise Activities Include Yard Work   Do you need help using the phone?  No   Are you deaf or do you  have serious difficulty hearing?  No   Do you need help with transportation? No   Do you need help shopping? No   Do you need help preparing meals?  No   Do you need help with housework?  No   Do you need help with laundry? No   Do you need help taking your medications? No   Do you need help managing money? No   Do you ever drive or ride in a car without wearing a seat belt? No   Have you felt unusual stress, anger or loneliness in the last month? No   Who do you live with? Alone   If you need help, do you have trouble finding someone available to you? No   Have you been bothered in the last four weeks by sexual problems? No   Do you have difficulty concentrating, remembering or making decisions? No         Does the patient have evidence of cognitive impairment? No    Asprin use counseling:Does not need ASA (and currently is not on it)    Age-appropriate Screening Schedule:  Refer to the list below for future screening recommendations based on patient's age, sex and/or medical conditions. Orders for these recommended tests are listed in the plan section. The patient has been provided with a written plan.    Health Maintenance   Topic Date Due   • COLONOSCOPY  07/01/2020   • INFLUENZA VACCINE  08/01/2020   • TDAP/TD VACCINES (2 - Td) 06/13/2027   • MAMMOGRAM  Discontinued   • ZOSTER VACCINE  Discontinued          The following portions of the patient's history were reviewed and updated as appropriate: allergies, current medications, past family history, past medical history, past social history, past surgical history and problem list.    Outpatient Medications Prior to Visit   Medication Sig Dispense Refill   • amoxicillin (AMOXIL) 875 MG tablet Take 1 tablet by mouth 2 (Two) Times a Day. 20 tablet 0     No facility-administered medications prior to visit.        Patient Active Problem List   Diagnosis   • Breast cancer (CMS/[facility])   • Lymphedema of upper extremity   • Medicare annual wellness visit, subsequent     No  "longer monitored through hematology oncology followed by gynecology  Advanced Care Planning:  ACP discussion was held with the patient during this visit. Patient has an advance directive in EMR which is still valid.   Justina Saab - sister  Review of Systems    Compared to one year ago, the patient feels her physical health is the same.  Compared to one year ago, the patient feels her mental health is the same.    Reviewed chart for potential of high risk medication in the elderly: not applicable  Reviewed chart for potential of harmful drug interactions in the elderly:not applicable    Objective         Vitals:    06/16/20 1058   BP: 142/62   BP Location: Right arm   Patient Position: Sitting   Cuff Size: Adult   Pulse: 67   SpO2: 98%   Weight: 72.9 kg (160 lb 11.2 oz)   Height: 157.3 cm (61.93\")   PainSc: 0-No pain       Body mass index is 29.46 kg/m².  Discussed the patient's BMI with her. The BMI is above average; BMI management plan is completed.    Physical Exam          Assessment/Plan   Medicare Risks and Personalized Health Plan  CMS Preventative Services Quick Reference  Advance Directive Discussion  Hearing Problem  Immunizations Discussed/Encouraged (specific immunizations; Shingrix )  Ringing ears  The above risks/problems have been discussed with the patient.  Pertinent information has been shared with the patient in the After Visit Summary.  Follow up plans and orders are seen below in the Assessment/Plan Section.    Diagnoses and all orders for this visit:    1. Medicare annual wellness visit, subsequent (Primary)  -     Lipid Panel  -     CBC & Differential  -     Comprehensive Metabolic Panel  -     CBC Auto Differential      Follow Up:  Return if symptoms worsen or fail to improve, for Recheck, Next scheduled follow up.     An After Visit Summary and PPPS were given to the patient.       Follow-up results of blood work      "

## 2021-03-03 DIAGNOSIS — Z23 IMMUNIZATION DUE: ICD-10-CM

## 2021-05-06 ENCOUNTER — TELEPHONE (OUTPATIENT)
Dept: GASTROENTEROLOGY | Facility: CLINIC | Age: 71
End: 2021-05-06

## 2021-05-06 NOTE — TELEPHONE ENCOUNTER
----- Message from Dena Cho sent at 5/5/2021  4:05 PM EDT -----  Regarding: Routine scope  Contact: [phone]  PT needs to complete open access for a routine scope

## 2021-05-07 ENCOUNTER — TELEPHONE (OUTPATIENT)
Dept: GASTROENTEROLOGY | Facility: CLINIC | Age: 71
End: 2021-05-07

## 2021-05-07 NOTE — TELEPHONE ENCOUNTER
Last scope 2010, no record available-- no personal or family hx of polyps or colon ca--no ASA or blood thinners-- Patient not currently taking any medication      OA form scanned into media

## 2021-05-18 ENCOUNTER — PREP FOR SURGERY (OUTPATIENT)
Dept: OTHER | Facility: HOSPITAL | Age: 71
End: 2021-05-18

## 2021-05-18 ENCOUNTER — TELEPHONE (OUTPATIENT)
Dept: GASTROENTEROLOGY | Facility: CLINIC | Age: 71
End: 2021-05-18

## 2021-05-18 DIAGNOSIS — Z12.11 SCREEN FOR COLON CANCER: Primary | ICD-10-CM

## 2021-06-14 ENCOUNTER — HOSPITAL ENCOUNTER (OUTPATIENT)
Facility: HOSPITAL | Age: 71
Setting detail: HOSPITAL OUTPATIENT SURGERY
Discharge: HOME | End: 2021-06-14
Attending: INTERNAL MEDICINE | Admitting: INTERNAL MEDICINE

## 2021-06-14 ENCOUNTER — ANESTHESIA EVENT (OUTPATIENT)
Dept: GASTROENTEROLOGY | Facility: HOSPITAL | Age: 71
End: 2021-06-14

## 2021-06-14 ENCOUNTER — ANESTHESIA (OUTPATIENT)
Dept: GASTROENTEROLOGY | Facility: HOSPITAL | Age: 71
End: 2021-06-14

## 2021-06-14 VITALS
SYSTOLIC BLOOD PRESSURE: 137 MMHG | OXYGEN SATURATION: 100 % | HEIGHT: 61 IN | WEIGHT: 181 LBS | DIASTOLIC BLOOD PRESSURE: 64 MMHG | BODY MASS INDEX: 34.17 KG/M2 | HEART RATE: 72 BPM | RESPIRATION RATE: 14 BRPM | TEMPERATURE: 98.4 F

## 2021-06-14 DIAGNOSIS — Z12.11 SCREEN FOR COLON CANCER: ICD-10-CM

## 2021-06-14 PROCEDURE — 45380 COLONOSCOPY AND BIOPSY: CPT | Performed by: INTERNAL MEDICINE

## 2021-06-14 PROCEDURE — S0260 H&P FOR SURGERY: HCPCS | Performed by: INTERNAL MEDICINE

## 2021-06-14 PROCEDURE — 25010000002 PROPOFOL 10 MG/ML EMULSION: Performed by: NURSE ANESTHETIST, CERTIFIED REGISTERED

## 2021-06-14 PROCEDURE — 88305 TISSUE EXAM BY PATHOLOGIST: CPT | Performed by: INTERNAL MEDICINE

## 2021-06-14 PROCEDURE — 45385 COLONOSCOPY W/LESION REMOVAL: CPT | Performed by: INTERNAL MEDICINE

## 2021-06-14 RX ORDER — LIDOCAINE HYDROCHLORIDE 20 MG/ML
INJECTION, SOLUTION INFILTRATION; PERINEURAL AS NEEDED
Status: DISCONTINUED | OUTPATIENT
Start: 2021-06-14 | End: 2021-06-14 | Stop reason: SURG

## 2021-06-14 RX ORDER — SODIUM CHLORIDE, SODIUM LACTATE, POTASSIUM CHLORIDE, CALCIUM CHLORIDE 600; 310; 30; 20 MG/100ML; MG/100ML; MG/100ML; MG/100ML
30 INJECTION, SOLUTION INTRAVENOUS CONTINUOUS PRN
Status: DISCONTINUED | OUTPATIENT
Start: 2021-06-14 | End: 2021-06-14 | Stop reason: HOSPADM

## 2021-06-14 RX ORDER — PROPOFOL 10 MG/ML
VIAL (ML) INTRAVENOUS CONTINUOUS PRN
Status: DISCONTINUED | OUTPATIENT
Start: 2021-06-14 | End: 2021-06-14 | Stop reason: SURG

## 2021-06-14 RX ADMIN — SODIUM CHLORIDE, POTASSIUM CHLORIDE, SODIUM LACTATE AND CALCIUM CHLORIDE 30 ML/HR: 600; 310; 30; 20 INJECTION, SOLUTION INTRAVENOUS at 13:26

## 2021-06-14 RX ADMIN — PROPOFOL 180 MCG/KG/MIN: 10 INJECTION, EMULSION INTRAVENOUS at 14:07

## 2021-06-14 RX ADMIN — LIDOCAINE HYDROCHLORIDE 60 MG: 20 INJECTION, SOLUTION INFILTRATION; PERINEURAL at 14:07

## 2021-06-14 NOTE — H&P
"[facility]  Pre Procedure History & Physical    Chief Complaint:   Colonoscopy screening    Subjective     HPI:   Patient 71-year-old female with history of breast cancer, skin cancer and ovarian cyst presenting for screening.  Patient with no GI complaints here for colonoscopy.    Past Medical History:   Past Medical History:   Diagnosis Date   • Breast cancer (CMS/HCC) 2009    Left   • Fibroids    • H/O abnormal mammogram 1990    with biopsy showing lobular carcinoma in situ   • H/O left breast biopsy 1990   • Hx of bilateral mastectomy 2009   • Ovarian cysts    • Skin cancer     Nose       Past Surgical History:  Past Surgical History:   Procedure Laterality Date   • BREAST BIOPSY Left 1990   • BREAST BIOPSY Left 2009   • BREAST BIOPSY Right    • BREAST SURGERY Bilateral 2000    lobular carcinoma in situ 1990 excision   • COLONOSCOPY  07/2010    Dr Clarke   • HYSTEROSCOPY  2000   • MASTECTOMY Bilateral 2009       Family History:  Family History   Problem Relation Age of Onset   • Other Father         Pancreatic disease   • Cancer Paternal Aunt    • Breast cancer Paternal Aunt         Paternal great aunt   • COPD Mother    • Emphysema Mother    • No Known Problems Sister    • No Known Problems Brother    • Malig Hyperthermia Neg Hx        Social History:   reports that she has never smoked. She has never used smokeless tobacco. She reports current alcohol use. Drug use questions deferred to the physician.    Medications:   No medications prior to admission.       Allergies:  Patient has no known allergies.    ROS:    Pertinent items are noted in HPI     Objective     Blood pressure 157/87, pulse 75, temperature 98.4 °F (36.9 °C), temperature source Oral, resp. rate 16, height 154.9 cm (61\"), weight 82.1 kg (181 lb), SpO2 100 %, not currently breastfeeding.    Physical Exam   Constitutional: Pt is oriented to person, place, and time and well-developed, well-nourished, and in no distress. "   Mouth/Throat: Oropharynx is clear and moist.   Neck: Normal range of motion.   Cardiovascular: Normal rate, regular rhythm and normal heart sounds.    Pulmonary/Chest: Effort normal and breath sounds normal.   Abdominal: Soft. Nontender  Skin: Skin is warm and dry.   Psychiatric: Mood, memory, affect and judgment normal.     Assessment/Plan     Diagnosis:  Colonoscopy screening    Anticipated Surgical Procedure:  Colonoscopy    The risks, benefits, and alternatives of this procedure have been discussed with the patient or the responsible party- the patient understands and agrees to proceed.

## 2021-06-14 NOTE — ANESTHESIA POSTPROCEDURE EVALUATION
"Patient: America Crews    Procedure Summary     Date: 06/14/21 Room / Location:  [facility]    Anesthesia Start: 1400 Anesthesia Stop: 1431    Procedure: COLONOSCOPY to cecum and TI with cold polypectomy and cold snare polypectomy (N/A ) Diagnosis:       Screen for colon cancer      Colon polyps      Diverticulosis      Internal hemorrhoids      (Screen for colon cancer [Z12.11])    Surgeons: Blaise Clarke MD Provider: Guy Betts MD    Anesthesia Type: MAC ASA Status: 3          Anesthesia Type: MAC    Vitals  Vitals Value Taken Time   /64 06/14/21 1453   Temp     Pulse 72 06/14/21 1453   Resp 14 06/14/21 1453   SpO2 100 % 06/14/21 1453           Post Anesthesia Care and Evaluation    Patient location during evaluation: bedside  Patient participation: complete - patient participated  Level of consciousness: awake and alert  Pain management: adequate  Airway patency: patent  Anesthetic complications: No anesthetic complications    Cardiovascular status: acceptable  Respiratory status: acceptable  Hydration status: acceptable    Comments: /64 (BP Location: Left arm, Patient Position: Sitting)   Pulse 72   Temp 36.9 °C (98.4 °F) (Oral)   Resp 14   Ht 154.9 cm (61\")   Wt 82.1 kg (181 lb)   SpO2 100%   BMI 34.20 kg/m²       "

## 2021-06-14 NOTE — ANESTHESIA PREPROCEDURE EVALUATION
Anesthesia Evaluation     Patient summary reviewed and Nursing notes reviewed   NPO Solid Status: > 8 hours  NPO Liquid Status: > 4 hours           Airway   Mallampati: II  TM distance: >3 FB  Neck ROM: full  no difficulty expected  Dental - normal exam     Pulmonary - normal exam   Cardiovascular - normal exam        Neuro/Psych  GI/Hepatic/Renal/Endo    (+) obesity,       Musculoskeletal     Abdominal  - normal exam   Substance History      OB/GYN          Other      history of cancer                    Anesthesia Plan    ASA 3     MAC       Anesthetic plan, all risks, benefits, and alternatives have been provided, discussed and informed consent has been obtained with: patient.

## 2021-06-14 NOTE — BRIEF OP NOTE
COLONOSCOPY  Progress Note    America Crews  6/14/2021    Pre-op Diagnosis:   Screen for colon cancer [Z12.11]       Post-Op Diagnosis Codes:     * Screen for colon cancer [Z12.11]     * Colon polyps [K63.5]     * Diverticulosis [K57.90]     * Internal hemorrhoids [K64.8]    Procedure/CPT® Codes:        Procedure(s):  COLONOSCOPY to cecum and TI with cold polypectomy and cold snare polypectomy    Surgeon(s):  Blaise Clarke MD    Anesthesia: Monitored Anesthesia Care    Staff:   Endo Technician: Fina Najera  Endo Nurse: Ludy Baeza RN         Estimated Blood Loss: minimal    Urine Voided: * No values recorded between 6/14/2021  1:58 PM and 6/14/2021  2:31 PM *    Specimens:                Specimens     ID Source Type Tests Collected By Collected At Frozen?    A Large Intestine, Right / Ascending Colon Polyp · TISSUE PATHOLOGY EXAM   Blaise Clarke MD 6/14/21 9115     Description: polyp    B Large Intestine, Left / Descending Colon Polyp · TISSUE PATHOLOGY EXAM   Blaise Clarke MD 6/14/21 8938     Description: polyp                Drains: * No LDAs found *    Findings: Colonoscopy to the terminal ileum with normal ileum mucosa.  Ascending colon polyp removed cold biopsy descending colon polyp removed cold snare.  Sigmoid diverticulosis and internal hemorrhoids were seen.    Complications: None          Blaise Clarke MD     Date: 6/14/2021  Time: 14:32 EDT

## 2021-06-16 LAB
CYTO UR: NORMAL
LAB AP CASE REPORT: NORMAL
PATH REPORT.FINAL DX SPEC: NORMAL
PATH REPORT.GROSS SPEC: NORMAL

## 2021-06-25 ENCOUNTER — OFFICE VISIT (OUTPATIENT)
Dept: INTERNAL MEDICINE | Facility: CLINIC | Age: 71
End: 2021-06-25

## 2021-06-25 VITALS
OXYGEN SATURATION: 99 % | WEIGHT: 182.8 LBS | BODY MASS INDEX: 34.51 KG/M2 | HEIGHT: 61 IN | DIASTOLIC BLOOD PRESSURE: 74 MMHG | SYSTOLIC BLOOD PRESSURE: 136 MMHG | HEART RATE: 62 BPM

## 2021-06-25 DIAGNOSIS — N95.1 MENOPAUSAL STATE: ICD-10-CM

## 2021-06-25 DIAGNOSIS — Z00.00 MEDICARE ANNUAL WELLNESS VISIT, SUBSEQUENT: Primary | ICD-10-CM

## 2021-06-25 PROCEDURE — G0439 PPPS, SUBSEQ VISIT: HCPCS | Performed by: FAMILY MEDICINE

## 2021-06-25 NOTE — PROGRESS NOTES
The ABCs of the Annual Wellness Visit  Subsequent Medicare Wellness Visit    Chief Complaint   Patient presents with   • Medicare Wellness-subsequent       Subjective   History of Present Illness:  America Crews is a 70 y.o. female who presents for a Subsequent Medicare Wellness Visit.    HEALTH RISK ASSESSMENT    Recent Hospitalizations:  No hospitalization(s) within the last year.    Current Medical Providers:  Patient Care Team:  Ernesto Hansen MD as PCP - General (Family Medicine)  Morgan Valdez MD as Referring Physician (Breast Surgery)  Urbano Nettles MD as Consulting Physician (Hematology and Oncology)  Chino Flood MD as Consulting Physician (Hematology and Oncology)    Smoking Status:  Social History     Tobacco Use   Smoking Status Never Smoker   Smokeless Tobacco Never Used       Alcohol Consumption:  Social History     Substance and Sexual Activity   Alcohol Use Yes    Comment: Seldom       Depression Screen:   PHQ-2/PHQ-9 Depression Screening 6/25/2021   Little interest or pleasure in doing things 0   Feeling down, depressed, or hopeless 0   Total Score 0       Fall Risk Screen:  MARIAH Fall Risk Assessment was completed, and patient is at LOW risk for falls.Assessment completed on:6/25/2021    Health Habits and Functional and Cognitive Screening:  Functional & Cognitive Status 6/25/2021   Do you have difficulty preparing food and eating? No   Do you have difficulty bathing yourself, getting dressed or grooming yourself? No   Do you have difficulty using the toilet? No   Do you have difficulty moving around from place to place? No   Do you have trouble with steps or getting out of a bed or a chair? No   Current Diet Well Balanced Diet   Dental Exam Up to date   Eye Exam Up to date   Exercise (times per week) 7 times per week   Current Exercises Include Gardening   Current Exercise Activities Include -   Do you need help using the phone?  No   Are you deaf or do you have serious  difficulty hearing?  No   Do you need help with transportation? No   Do you need help shopping? No   Do you need help preparing meals?  No   Do you need help with housework?  No   Do you need help with laundry? No   Do you need help taking your medications? No   Do you need help managing money? No   Do you ever drive or ride in a car without wearing a seat belt? No   Have you felt unusual stress, anger or loneliness in the last month? No   Who do you live with? Alone   If you need help, do you have trouble finding someone available to you? No   Have you been bothered in the last four weeks by sexual problems? No   Do you have difficulty concentrating, remembering or making decisions? No         Does the patient have evidence of cognitive impairment? No    Asprin use counseling:Does not need ASA (and currently is not on it)    Age-appropriate Screening Schedule:  Refer to the list below for future screening recommendations based on patient's age, sex and/or medical conditions. Orders for these recommended tests are listed in the plan section. The patient has been provided with a written plan.    Health Maintenance   Topic Date Due   • DXA SCAN  07/27/2019   • INFLUENZA VACCINE  08/01/2021   • TDAP/TD VACCINES (2 - Td or Tdap) 06/13/2027   • MAMMOGRAM  Discontinued   • ZOSTER VACCINE  Discontinued          The following portions of the patient's history were reviewed and updated as appropriate: allergies, current medications, past family history, past medical history, past social history, past surgical history and problem list.    No outpatient medications prior to visit.     No facility-administered medications prior to visit.       Patient Active Problem List   Diagnosis   • Breast cancer (CMS/HCC)   • Lymphedema of upper extremity   • Medicare annual wellness visit, subsequent   • Screen for colon cancer       Advanced Care Planning:  ACP discussion was held with the patient during this visit. Patient has an  "advance directive in EMR which is still valid.     Review of Systems    Compared to one year ago, the patient feels her physical health is the same.  Compared to one year ago, the patient feels her mental health is the same.    Reviewed chart for potential of high risk medication in the elderly: yes  Reviewed chart for potential of harmful drug interactions in the elderly:yes    Objective         Vitals:    06/25/21 1043   BP: 136/74   BP Location: Right arm   Patient Position: Sitting   Cuff Size: Large Adult   Pulse: 62   SpO2: 99%   Weight: 82.9 kg (182 lb 12.8 oz)   Height: 154.9 cm (61\")       Body mass index is 34.54 kg/m².  Discussed the patient's BMI with her. The BMI is above average; BMI management plan is completed.  Some protein shakes helpful  Physical Exam          Assessment/Plan   Medicare Risks and Personalized Health Plan  CMS Preventative Services Quick Reference  Advance Directive Discussion  Inactivity/Sedentary    The above risks/problems have been discussed with the patient.  Pertinent information has been shared with the patient in the After Visit Summary.  Follow up plans and orders are seen below in the Assessment/Plan Section.    Diagnoses and all orders for this visit:    1. Medicare annual wellness visit, subsequent (Primary)    2. Menopausal state  -     DEXA Bone Density Axial; Future      Follow Up:  Return if symptoms worsen or fail to improve, for Recheck, Annual physical, Medicare Wellness.     An After Visit Summary and PPPS were given to the patient.               "

## 2021-07-30 ENCOUNTER — TELEPHONE (OUTPATIENT)
Dept: GASTROENTEROLOGY | Facility: CLINIC | Age: 71
End: 2021-07-30

## 2021-07-30 NOTE — TELEPHONE ENCOUNTER
----- Message from Blaise Clarke MD sent at 6/30/2021 10:19 AM EDT -----  Polyp benign repeat: 5 years as discussed

## 2021-09-25 ENCOUNTER — IMMUNIZATION (OUTPATIENT)
Dept: VACCINE CLINIC | Facility: HOSPITAL | Age: 71
End: 2021-09-25

## 2021-09-25 PROCEDURE — 91300 HC SARSCOV02 VAC 30MCG/0.3ML IM: CPT | Performed by: INTERNAL MEDICINE

## 2021-09-25 PROCEDURE — 0001A: CPT | Performed by: INTERNAL MEDICINE

## 2021-09-30 ENCOUNTER — HOSPITAL ENCOUNTER (OUTPATIENT)
Dept: BONE DENSITY | Facility: HOSPITAL | Age: 71
Discharge: HOME | End: 2021-09-30
Admitting: FAMILY MEDICINE

## 2021-09-30 DIAGNOSIS — N95.1 MENOPAUSAL STATE: ICD-10-CM

## 2021-09-30 PROCEDURE — 77080 DXA BONE DENSITY AXIAL: CPT

## 2023-01-23 ENCOUNTER — OFFICE VISIT (OUTPATIENT)
Dept: FAMILY MEDICINE CLINIC | Facility: CLINIC | Age: 73
End: 2023-01-23
Payer: MEDICARE

## 2023-01-23 VITALS
OXYGEN SATURATION: 98 % | SYSTOLIC BLOOD PRESSURE: 146 MMHG | BODY MASS INDEX: 36.44 KG/M2 | HEIGHT: 61 IN | TEMPERATURE: 97.7 F | HEART RATE: 96 BPM | WEIGHT: 193 LBS | DIASTOLIC BLOOD PRESSURE: 86 MMHG

## 2023-01-23 DIAGNOSIS — N95.8 OTHER SPECIFIED MENOPAUSAL AND PERIMENOPAUSAL DISORDERS: ICD-10-CM

## 2023-01-23 DIAGNOSIS — Z13.6 SCREENING FOR ISCHEMIC HEART DISEASE: ICD-10-CM

## 2023-01-23 DIAGNOSIS — R03.0 ELEVATED BP WITHOUT DIAGNOSIS OF HYPERTENSION: ICD-10-CM

## 2023-01-23 DIAGNOSIS — Z00.00 MEDICARE ANNUAL WELLNESS VISIT, SUBSEQUENT: Primary | ICD-10-CM

## 2023-01-23 DIAGNOSIS — R09.89 OTHER SPECIFIED SYMPTOMS AND SIGNS INVOLVING THE CIRCULATORY AND RESPIRATORY SYSTEMS: ICD-10-CM

## 2023-01-23 PROCEDURE — G0439 PPPS, SUBSEQ VISIT: HCPCS | Performed by: STUDENT IN AN ORGANIZED HEALTH CARE EDUCATION/TRAINING PROGRAM

## 2023-01-23 PROCEDURE — 1170F FXNL STATUS ASSESSED: CPT | Performed by: STUDENT IN AN ORGANIZED HEALTH CARE EDUCATION/TRAINING PROGRAM

## 2023-01-23 PROCEDURE — 1126F AMNT PAIN NOTED NONE PRSNT: CPT | Performed by: STUDENT IN AN ORGANIZED HEALTH CARE EDUCATION/TRAINING PROGRAM

## 2023-01-23 PROCEDURE — 1159F MED LIST DOCD IN RCRD: CPT | Performed by: STUDENT IN AN ORGANIZED HEALTH CARE EDUCATION/TRAINING PROGRAM

## 2023-01-23 RX ORDER — DIPHENOXYLATE HYDROCHLORIDE AND ATROPINE SULFATE 2.5; .025 MG/1; MG/1
TABLET ORAL DAILY
COMMUNITY

## 2023-01-23 RX ORDER — ASCORBIC ACID/MULTIVIT-MIN 1000 MG
EFFERVESCENT POWDER IN PACKET ORAL
COMMUNITY

## 2023-01-23 NOTE — PROGRESS NOTES
The ABCs of the Annual Wellness Visit  Subsequent Medicare Wellness Visit    Subjective      America Crews is a 72 y.o. female who presents for a Subsequent Medicare Wellness Visit.    The following portions of the patient's history were reviewed and   updated as appropriate: allergies, current medications, past family history, past medical history, past social history, past surgical history and problem list.    Compared to one year ago, the patient feels her physical   health is the same.    Compared to one year ago, the patient feels her mental   health is the same.    Recent Hospitalizations:  She was not admitted to the hospital during the last year.       Current Medical Providers:  Patient Care Team:  Naz Lubin MD as PCP - General (Family Medicine)  Morgan Valdez MD as Referring Physician (Breast Surgery)  Urbano Nettles MD as Consulting Physician (Hematology and Oncology)  Chino Flood MD as Consulting Physician (Hematology and Oncology)    Outpatient Medications Prior to Visit   Medication Sig Dispense Refill   • Elderberry 575 MG/5ML syrup Take  by mouth.     • Multiple Vitamins-Minerals (Emergen-C Vitamin C) pack Take  by mouth.     • multivitamin (MULTI-VITAMIN DAILY PO) Take  by mouth Daily.       No facility-administered medications prior to visit.       No opioid medication identified on active medication list. I have reviewed chart for other potential  high risk medication/s and harmful drug interactions in the elderly.          Aspirin is not on active medication list.  Aspirin use is not indicated based on review of current medical condition/s. Risk of harm outweighs potential benefits.  .    Patient Active Problem List   Diagnosis   • Breast cancer (HCC)   • Lymphedema of upper extremity   • Medicare annual wellness visit, subsequent   • Screen for colon cancer     Advance Care Planning  Advance Directive is on file.  ACP discussion was held with the patient during this  "visit. Patient has an advance directive in EMR which is still valid.      Objective    Vitals:    01/23/23 1328 01/23/23 1337   BP: 150/90 146/86   BP Location: Right arm Right arm   Patient Position: Sitting Sitting   Cuff Size: Adult Adult   Pulse: 96    Temp: 97.7 °F (36.5 °C)    TempSrc: Infrared    SpO2: 98%    Weight: 87.5 kg (193 lb)    Height: 154.9 cm (61\")    PainSc: 0-No pain      Estimated body mass index is 36.47 kg/m² as calculated from the following:    Height as of this encounter: 154.9 cm (61\").    Weight as of this encounter: 87.5 kg (193 lb).    Class 2 Severe Obesity (BMI >=35 and <=39.9). Obesity-related health conditions include the following: hypertension. Obesity is unchanged. BMI is is above average; BMI management plan is completed. We discussed low calorie, low carb based diet program, portion control, increasing exercise, joining a fitness center or start home based exercise program, Weight Watchers or other Commercial based weight reduction program and an reanna-based approach such as [email or web address] Pal or Lose It.      Does the patient have evidence of cognitive impairment?   No            HEALTH RISK ASSESSMENT    Smoking Status:  Social History     Tobacco Use   Smoking Status Never   Smokeless Tobacco Never     Alcohol Consumption:  Social History     Substance and Sexual Activity   Alcohol Use Yes    Comment: Seldom     Fall Risk Screen:    STEADI Fall Risk Assessment was completed, and patient is at LOW risk for falls.Assessment completed on:1/23/2023    Depression Screening:  PHQ-2/PHQ-9 Depression Screening 1/23/2023   Little Interest or Pleasure in Doing Things 0-->not at all   Feeling Down, Depressed or Hopeless 0-->not at all   PHQ-9: Brief Depression Severity Measure Score 0       Health Habits and Functional and Cognitive Screening:  Functional & Cognitive Status 6/25/2021   Do you have difficulty preparing food and eating? No   Do you have difficulty bathing yourself, getting " dressed or grooming yourself? No   Do you have difficulty using the toilet? No   Do you have difficulty moving around from place to place? No   Do you have trouble with steps or getting out of a bed or a chair? No   Current Diet Well Balanced Diet   Dental Exam Up to date   Eye Exam Up to date   Exercise (times per week) 7 times per week   Current Exercises Include Gardening   Current Exercise Activities Include -   Do you need help using the phone?  No   Are you deaf or do you have serious difficulty hearing?  No   Do you need help with transportation? No   Do you need help shopping? No   Do you need help preparing meals?  No   Do you need help with housework?  No   Do you need help with laundry? No   Do you need help taking your medications? No   Do you need help managing money? No   Do you ever drive or ride in a car without wearing a seat belt? No   Have you felt unusual stress, anger or loneliness in the last month? No   Who do you live with? Alone   If you need help, do you have trouble finding someone available to you? No   Have you been bothered in the last four weeks by sexual problems? No   Do you have difficulty concentrating, remembering or making decisions? No       Age-appropriate Screening Schedule:  Refer to the list below for future screening recommendations based on patient's age, sex and/or medical conditions. Orders for these recommended tests are listed in the plan section. The patient has been provided with a written plan.    Health Maintenance   Topic Date Due   • DXA SCAN  09/30/2023   • TDAP/TD VACCINES (2 - Td or Tdap) 06/13/2027   • INFLUENZA VACCINE  Completed   • MAMMOGRAM  Discontinued   • ZOSTER VACCINE  Discontinued                CMS Preventative Services Quick Reference  Risk Factors Identified During Encounter:    Dental Screening Recommended  Vision Screening Recommended    The above risks/problems have been discussed with the patient.  Pertinent information has been shared with  the patient in the After Visit Summary.    Diagnoses and all orders for this visit:    1. Medicare annual wellness visit, subsequent (Primary)    2. Screening for ischemic heart disease  -     Comprehensive Metabolic Panel  -     Lipid Panel    3. Elevated BP without diagnosis of hypertension  -     Comprehensive Metabolic Panel  -     TSH    4. Other specified symptoms and signs involving the circulatory and respiratory systems  -     TSH    5. Other specified menopausal and perimenopausal disorders  -     DEXA Bone Density Axial; Future        Follow Up:   Next Medicare Wellness visit to be scheduled in 1 year.      An After Visit Summary and PPPS were made available to the patient.

## 2023-01-24 LAB
ALBUMIN SERPL-MCNC: 4.5 G/DL (ref 3.5–5.2)
ALBUMIN/GLOB SERPL: 1.5 G/DL
ALP SERPL-CCNC: 100 U/L (ref 39–117)
ALT SERPL-CCNC: 24 U/L (ref 1–33)
AST SERPL-CCNC: 22 U/L (ref 1–32)
BILIRUB SERPL-MCNC: 0.3 MG/DL (ref 0–1.2)
BUN SERPL-MCNC: 14 MG/DL (ref 8–23)
BUN/CREAT SERPL: 18.4 (ref 7–25)
CALCIUM SERPL-MCNC: 10 MG/DL (ref 8.6–10.5)
CHLORIDE SERPL-SCNC: 99 MMOL/L (ref 98–107)
CHOLEST SERPL-MCNC: 195 MG/DL (ref 0–200)
CO2 SERPL-SCNC: 28.8 MMOL/L (ref 22–29)
CREAT SERPL-MCNC: 0.76 MG/DL (ref 0.57–1)
EGFRCR SERPLBLD CKD-EPI 2021: 82.3 ML/MIN/1.73
GLOBULIN SER CALC-MCNC: 3.1 GM/DL
GLUCOSE SERPL-MCNC: 93 MG/DL (ref 65–99)
HDLC SERPL-MCNC: 59 MG/DL (ref 40–60)
LDLC SERPL CALC-MCNC: 96 MG/DL (ref 0–100)
POTASSIUM SERPL-SCNC: 4.5 MMOL/L (ref 3.5–5.2)
PROT SERPL-MCNC: 7.6 G/DL (ref 6–8.5)
SODIUM SERPL-SCNC: 136 MMOL/L (ref 136–145)
TRIGL SERPL-MCNC: 239 MG/DL (ref 0–150)
TSH SERPL DL<=0.005 MIU/L-ACNC: 2.11 UIU/ML (ref 0.27–4.2)
VLDLC SERPL CALC-MCNC: 40 MG/DL (ref 5–40)

## 2023-02-27 ENCOUNTER — OFFICE VISIT (OUTPATIENT)
Dept: FAMILY MEDICINE CLINIC | Facility: CLINIC | Age: 73
End: 2023-02-27
Payer: MEDICARE

## 2023-02-27 VITALS
TEMPERATURE: 97.1 F | WEIGHT: 192 LBS | BODY MASS INDEX: 36.25 KG/M2 | HEIGHT: 61 IN | DIASTOLIC BLOOD PRESSURE: 80 MMHG | HEART RATE: 88 BPM | OXYGEN SATURATION: 98 % | SYSTOLIC BLOOD PRESSURE: 142 MMHG

## 2023-02-27 DIAGNOSIS — R03.0 ELEVATED BP WITHOUT DIAGNOSIS OF HYPERTENSION: Primary | ICD-10-CM

## 2023-02-27 PROCEDURE — 99213 OFFICE O/P EST LOW 20 MIN: CPT | Performed by: STUDENT IN AN ORGANIZED HEALTH CARE EDUCATION/TRAINING PROGRAM

## 2023-02-27 NOTE — ASSESSMENT & PLAN NOTE
Controlled based on home readings.  BP in office 142/80 which is above goal but home readings reveal consistently <140/90.  Asymptomatic.  No antihypertensives required. I would recommend she continue home BP monitoring 1-2x weekly.  If consistently >140/90, requested patient come in for manual BP check.

## 2023-02-27 NOTE — PROGRESS NOTES
"Chief Complaint  Hypertension (1 month follow up/-recheck bp/-pt bought bp machine and recent readings )    Subjective        America Crews presents to [facility]  History of Present Illness  72-year-old female with history of breast cancer who presents for follow-up of elevated blood pressure.    At her Medicare wellness appointment last month, patient's blood pressure was above normal at 150/90.  Repeat was 146/86.  She was asymptomatic at that time.  Since that appointment she has purchased a blood pressure cuff and has been taking her blood pressure regularly with readings consistently less than 140/90.  She remains asymptomatic.  She brings her blood pressure cuff to the office today and BP with manual cuff was 142/80.  Her automatic cuff showed BP of 156/85.      Objective   Vital Signs:  /80 (BP Location: Right arm, Patient Position: Sitting, Cuff Size: Adult)   Pulse 88   Temp 97.1 °F (36.2 °C) (Infrared)   Ht 154.9 cm (61\")   Wt 87.1 kg (192 lb)   SpO2 98%   BMI 36.28 kg/m²   Estimated body mass index is 36.28 kg/m² as calculated from the following:    Height as of this encounter: 154.9 cm (61\").    Weight as of this encounter: 87.1 kg (192 lb).         Physical Exam  Constitutional:       General: She is not in acute distress.  Eyes:      Conjunctiva/sclera: Conjunctivae normal.   Cardiovascular:      Rate and Rhythm: Normal rate and regular rhythm.   Pulmonary:      Effort: Pulmonary effort is normal. No respiratory distress.      Breath sounds: Normal breath sounds.   Skin:     General: Skin is warm and dry.   Neurological:      Mental Status: She is alert and oriented to person, place, and time.   Psychiatric:         Mood and Affect: Mood normal.         Behavior: Behavior normal.        Result Review :  The following data was reviewed by: Naz Lubin MD on 02/27/2023:  Common labs    Common Labs 1/23/23 1/23/23    1409 1409   Glucose 93    BUN 14  "   Creatinine 0.76    Sodium 136    Potassium 4.5    Chloride 99    Calcium 10.0    Total Protein 7.6    Albumin 4.5    Total Bilirubin 0.3    Alkaline Phosphatase 100    AST (SGOT) 22    ALT (SGPT) 24    Total Cholesterol  195   Triglycerides  239 (A)   HDL Cholesterol  59   LDL Cholesterol   96   (A) Abnormal value       Comments are available for some flowsheets but are not being displayed.           Data reviewed: none             Assessment and Plan   Diagnoses and all orders for this visit:    1. Elevated BP without diagnosis of hypertension (Primary)  Assessment & Plan:  Controlled based on home readings.  BP in office 142/80 which is above goal but home readings reveal consistently <140/90.  Asymptomatic.  No antihypertensives required. I would recommend she continue home BP monitoring 1-2x weekly.  If consistently >140/90, requested patient come in for manual BP check.            I spent 20 minutes caring for America on this date of service. This time includes time spent by me in the following activities:preparing for the visit, performing a medically appropriate examination and/or evaluation , counseling and educating the patient/family/caregiver and documenting information in the medical record  Follow Up   No follow-ups on file.  Patient was given instructions and counseling regarding her condition or for health maintenance advice. Please see specific information pulled into the AVS if appropriate.

## 2023-06-21 ENCOUNTER — TELEPHONE (OUTPATIENT)
Dept: FAMILY MEDICINE CLINIC | Facility: CLINIC | Age: 73
End: 2023-06-21

## 2023-06-21 NOTE — TELEPHONE ENCOUNTER
Caller: America Crews    Relationship to patient: Self    Best call back number: [phone]     Date of positive COVID19 test: 06/19/23 AND 06/21/23    COVID19 symptoms: SORE THROAT / CHEST CONGESTION    Additional information or concerns: PATIENT WOULD LIKE MEDICATION TO HELP EASE SYMPTOMS AND SPEED UP RECOVERY    What is the patients preferred pharmacy: [id number] [place], [place] - [address] AT [facility] [address] - [phone] [facility] [phone]  [phone]

## 2023-06-21 NOTE — TELEPHONE ENCOUNTER
I would recommend starting Mucinex DM for cough, congestion. Adding flonase daily can also help with these symptoms. Okay to take Tylenol extra strength for body aches, headaches, fevers.     We can also prescribe Paxlovid (antiviral medication) if symptoms have only been present for <5days. Looks like first positive test is <5days ago but please confirm when symptoms began.     If she develops severe shortness of breath, please seek immediate medical attention at the closest ER.

## 2023-08-10 ENCOUNTER — TELEPHONE (OUTPATIENT)
Dept: FAMILY MEDICINE CLINIC | Facility: CLINIC | Age: 73
End: 2023-08-10
Payer: MEDICARE

## 2023-08-10 DIAGNOSIS — I89.0 LYMPHEDEMA: Primary | ICD-10-CM

## 2023-08-10 NOTE — TELEPHONE ENCOUNTER
Any fever, chills, or shortness of breath? Given length of symptoms, she should probably be seen and have xray.

## 2023-08-10 NOTE — TELEPHONE ENCOUNTER
Pt called in and stated that she is still having cough, phelm and congestion in her lungs and wants to see if the lingering symptoms are normal. Please advise.

## 2023-08-10 NOTE — TELEPHONE ENCOUNTER
Pt stated that in 2020 she was given a referral by another provider for physical therapy. She states that she did not go and is requesting a new referral. She would like to go to the Physical therapy at the [place] for lymphoedema    Please advise.

## 2023-08-11 ENCOUNTER — OFFICE VISIT (OUTPATIENT)
Dept: FAMILY MEDICINE CLINIC | Facility: CLINIC | Age: 73
End: 2023-08-11
Payer: MEDICARE

## 2023-08-11 VITALS
HEART RATE: 108 BPM | TEMPERATURE: 98 F | OXYGEN SATURATION: 98 % | DIASTOLIC BLOOD PRESSURE: 82 MMHG | HEIGHT: 61 IN | SYSTOLIC BLOOD PRESSURE: 148 MMHG | WEIGHT: 189 LBS | BODY MASS INDEX: 35.68 KG/M2

## 2023-08-11 DIAGNOSIS — R05.1 ACUTE COUGH: ICD-10-CM

## 2023-08-11 DIAGNOSIS — R06.02 SHORTNESS OF BREATH: Primary | ICD-10-CM

## 2023-08-11 PROCEDURE — 71046 X-RAY EXAM CHEST 2 VIEWS: CPT | Performed by: NURSE PRACTITIONER

## 2023-08-11 PROCEDURE — 99213 OFFICE O/P EST LOW 20 MIN: CPT | Performed by: NURSE PRACTITIONER

## 2023-08-11 PROCEDURE — 1160F RVW MEDS BY RX/DR IN RCRD: CPT | Performed by: NURSE PRACTITIONER

## 2023-08-11 PROCEDURE — 1159F MED LIST DOCD IN RCRD: CPT | Performed by: NURSE PRACTITIONER

## 2023-08-11 RX ORDER — AZITHROMYCIN 250 MG/1
TABLET, FILM COATED ORAL
Qty: 6 TABLET | Refills: 0 | Status: SHIPPED | OUTPATIENT
Start: 2023-08-11

## 2023-08-11 RX ORDER — ALBUTEROL SULFATE 90 UG/1
2 AEROSOL, METERED RESPIRATORY (INHALATION) EVERY 4 HOURS PRN
Qty: 8 G | Refills: 0 | Status: SHIPPED | OUTPATIENT
Start: 2023-08-11

## 2023-08-11 NOTE — PROGRESS NOTES
"Chief Complaint  Shortness of Breath (Pt had Covid in June and has lingering productive cough and SOB. Requesting CXR. )    Subjective        America Crews presents to [facility]  History of Present Illness  America Crews is a 72 y.o. female who presents to the clinic today with concerns of shortness of breath.  Patient has had cough and shortness of breath that has worsened since she was diagnosed with COVID-19 in June.  Her cough has been productive.  It seems to be worse at night and during the day.  Her shortness of breath is only with activity, like yard work.  She denies new leg swelling, orthopnea, fever, or chest pain.  She denies personal history of asthma or COPD.  She denies history of smoking.      Review of Systems   Respiratory:  Positive for cough and shortness of breath.      Objective   Vital Signs:  /82   Pulse 108   Temp 98 øF (36.7 øC)   Ht 154.9 cm (61\")   Wt 85.7 kg (189 lb)   SpO2 98%   BMI 35.71 kg/mý   Estimated body mass index is 35.71 kg/mý as calculated from the following:    Height as of this encounter: 154.9 cm (61\").    Weight as of this encounter: 85.7 kg (189 lb).           Physical Exam  Vitals reviewed.   Constitutional:       General: She is not in acute distress.     Appearance: Normal appearance. She is not ill-appearing, toxic-appearing or diaphoretic.   HENT:      Head: Normocephalic and atraumatic.   Eyes:      General: No scleral icterus.        Right eye: No discharge.         Left eye: No discharge.      Extraocular Movements: Extraocular movements intact.   Cardiovascular:      Rate and Rhythm: Normal rate and regular rhythm.   Pulmonary:      Breath sounds: Examination of the left-lower field reveals wheezing. Wheezing present.   Neurological:      General: No focal deficit present.      Mental Status: She is alert and oriented to person, place, and time.      Motor: No weakness.      Gait: Gait normal.   Psychiatric:        "  Mood and Affect: Mood normal.         Behavior: Behavior normal.      Result Review :                   Assessment and Plan   Diagnoses and all orders for this visit:    1. Shortness of breath (Primary)  -     XR Chest PA & Lateral (In Office)  -     albuterol sulfate  (90 Base) MCG/ACT inhaler; Inhale 2 puffs Every 4 (Four) Hours As Needed for Shortness of Air.  Dispense: 8 g; Refill: 0    2. Acute cough  -     azithromycin (Zithromax Z-Nabeel) 250 MG tablet; Take 2 tablets the first day, then 1 tablet daily for 4 days.  Dispense: 6 tablet; Refill: 0  -     albuterol sulfate  (90 Base) MCG/ACT inhaler; Inhale 2 puffs Every 4 (Four) Hours As Needed for Shortness of Air.  Dispense: 8 g; Refill: 0      Wheezing to left lower lobe auscultation.  Chest x-ray performed today.  Will move forward with treating with azithromycin.  Await radiologist reading. Albuterol prescribed for patient to use as needed for wheezing or shortness of breath. We discussed how to use the inhaler.        Follow Up   Return if symptoms worsen or fail to improve.  Patient was given instructions and counseling regarding her condition or for health maintenance advice. Please see specific information pulled into the AVS if appropriate.     Electronically signed by BARI Aguirre, 08/14/23, 4:25 PM EDT.

## 2023-08-30 ENCOUNTER — HOSPITAL ENCOUNTER (OUTPATIENT)
Dept: PHYSICAL THERAPY | Facility: HOSPITAL | Age: 73
Setting detail: THERAPIES SERIES
Discharge: HOME | End: 2023-08-30
Payer: MEDICARE

## 2023-08-30 DIAGNOSIS — I89.0 LYMPHEDEMA OF UPPER EXTREMITY: ICD-10-CM

## 2023-08-30 DIAGNOSIS — Z17.1 MALIGNANT NEOPLASM OF UPPER-OUTER QUADRANT OF LEFT BREAST IN FEMALE, ESTROGEN RECEPTOR NEGATIVE: Primary | ICD-10-CM

## 2023-08-30 DIAGNOSIS — C50.412 MALIGNANT NEOPLASM OF UPPER-OUTER QUADRANT OF LEFT BREAST IN FEMALE, ESTROGEN RECEPTOR NEGATIVE: Primary | ICD-10-CM

## 2023-08-30 PROCEDURE — 97161 PT EVAL LOW COMPLEX 20 MIN: CPT

## 2023-08-30 PROCEDURE — 97535 SELF CARE MNGMENT TRAINING: CPT

## 2023-08-30 NOTE — THERAPY EVALUATION
Physical Therapy Lymphedema Initial Evaluation  [facility]     Patient Name: America Crews  : 1950  MRN: [id number]  Today's Date: 2023      Visit Date: 2023    Visit Dx:    ICD-10-CM ICD-9-CM   1. Malignant neoplasm of upper-outer quadrant of left breast in female, estrogen receptor negative  C50.412 174.4    Z17.1 V86.1   2. Lymphedema of upper extremity  I89.0 457.1       Patient Active Problem List   Diagnosis    Breast cancer    Lymphedema of upper extremity    Medicare annual wellness visit, subsequent    Screen for colon cancer    Elevated BP without diagnosis of hypertension        Past Medical History:   Diagnosis Date    Breast cancer     Left    Colon polyp     Fibroids     H/O abnormal mammogram     with biopsy showing lobular carcinoma in situ    H/O left breast biopsy     History of medical problems     Hx of bilateral mastectomy 2009    Lymphedema 2010    Ovarian cysts     Skin cancer     Nose        Past Surgical History:   Procedure Laterality Date    BREAST BIOPSY Left 1990    BREAST BIOPSY Left 2009    BREAST BIOPSY Right     BREAST SURGERY Bilateral     lobular carcinoma in situ  excision    COLONOSCOPY  2010    Dr Clarke    COLONOSCOPY N/A 2021    Procedure: COLONOSCOPY to cecum and TI with cold polypectomy and cold snare polypectomy;  Surgeon: Blaise Clarke MD;  Location: [facility] ENDOSCOPY;  Service: Gastroenterology;  Laterality: N/A;  pre: screening  Post: diverticulosis, polyps    COLONOSCOPY  2021    HYSTEROSCOPY  2000    LYMPH NODE BIOPSY  2009    MASTECTOMY Bilateral 2009       Visit Dx:    ICD-10-CM ICD-9-CM   1. Malignant neoplasm of upper-outer quadrant of left breast in female, estrogen receptor negative  C50.412 174.4    Z17.1 V86.1   2. Lymphedema of upper extremity  I89.0 457.1        Patient History       Row Name 23 0900             History    Chief Complaint Swelling  -LB      Date Current Problem(s)  Began 11/16/09  -LB      Brief Description of Current Complaint Pt with hx of LUE, chest, possible RUE lymphedema. She has hx of B mastectomy in 2009. She has had lymphedema  for last 10 years or more and has compression pump that she still uses. She Kinesiotapes daily for continued LUE lymphedema. She states she previously trialed CDT but was unable to tolerate UE compression custom or OTC. She occasionally does a gentle massage but not regularly. She has gained some weight over the last few years. She did [employer] in 2020 but no longer formally works out. She works in her yard. She went to [place] in last few months and states she used K tape during trip but did not bring compression pump.  -LB      Previous treatment for THIS PROBLEM Rehabilitation  -LB      Patient/Caregiver Goals Know what to do to help the symptoms  -LB      Hand Dominance right-handed  -LB      Occupation/sports/leisure activities enjoys yardwork, retired  -LB      How has patient tried to help current problem? daily use of compression pump, KTape  -LB      History of Previous Related Injuries hx of LUE lymphedema  -LB         Pain     Pain at Present 2  -LB      Pain at Best 2  -LB      Pain at Worst 2  -LB      Pain Description Aching;Heaviness  -LB      What Performance Factors Make the Current Problem(s) WORSE? increased activity  -LB      What Performance Factors Make the Current Problem(s) BETTER? Ktape, use of pump  -LB      Is your sleep disturbed? No  -LB      Difficulties at work? Pt does not work.  -LB      Difficulties with ADL's? No issues.  -LB      Difficulties with recreational activities? No issues.  -LB         Fall Risk Assessment    Any falls in the past year: No  -LB         Services    Prior Rehab/Home Health Experiences Yes  -LB      When was the prior experience with Rehab/Home Health 2019  -LB      Where was the prior experience with Rehab/Home Health [facility]  -LB      Are you currently receiving Home Health services No   -LB      Do you plan to receive Home Health services in the near future No  -LB         Daily Activities    Primary Language English  -LB      Pt Participated in POC and Goals Yes  -LB         Safety    Are you being hurt, hit, or frightened by anyone at home or in your life? No  -LB      Are you being neglected by a caregiver No  -LB      Have you had any of the following issues with N/A  -LB                User Key  (r) = Recorded By, (t) = Taken By, (c) = Cosigned By      Initials Name Provider Type    LB Lolita Yousif, PT Physical Therapist                     Lymphedema       Row Name 08/30/23 0800             Subjective Pain    Able to rate subjective pain? yes  -LB      Pre-Treatment Pain Level 2  -LB         Subjective Comments    Subjective Comments I am just here to kind of check on things and see if anything has gotten worse.  -LB         Lymphedema Assessment    Lymphedema Classification secondary;LUE:;stage 2 (Spontaneously Irreversible);RUE:;stage 1 (Spontaneously Reversible)  -LB      Lymphedema Cancer Related Sx axillary dissection;radical mastectomy;sentinel node biopsy;simple mastectomy  -LB      Lymphedema Surgery Comments 2009  -LB      Lymph Nodes Removed # 15  -LB      Positive Lymph Nodes # 8  -LB      Chemo Received yes  -LB      Radiation Therapy Received yes  -LB      Infections or Cellulitis? no  -LB         Posture/Observations    Posture/Observations Comments LUE lymphedema  -LB         General ROM    GENERAL ROM COMMENTS WNL  -LB         Lymphedema Edema Assessment    Ptting Edema Category By grade out of 4  -LB      Pitting Edema + 2/4;Mild;Moderate  left  -LB         Lymphedema Sensation    Lymphedema Sensation Tests light touch  -LB      Lymphedema Light Touch LUE:;mild impairment;RUE:;WNL  -LB         Lymphedema Pulses/Capillary Refill    Lymphedema Pulses/Capillary Refill capillary refill  -LB      Capillary Refill upper extremity capillary refill  -LB      Upper Extremity  Capillary Refill right:;left:;less than 3 seconds  -LB         Lymphedema Measurements    Measurement Type(s) Circumferential  -LB      Circumferential Areas Upper extremities  -LB         LUE Quick Girth (cm)    Axilla 38.5 cm  -LB      Mid upper arm 38.4 cm  -LB      Elbow 33.8 cm  -LB      Mid forearm 30.4 cm  -LB      Wrist crease 20.5 cm  -LB      Web space 22 cm  -LB      Met-heads 20.1 cm  -LB      LUE Quick Girth Total 203.7  -LB         RUE Quick Girth (cm)    Axilla 36.8 cm  -LB      Mid upper arm 36 cm  -LB      Elbow 29 cm  -LB      Mid forearm 27.7 cm  -LB      Wrist crease 17 cm  -LB      Web space 19.3 cm  -LB      Met-heads 18.5 cm  -LB      RUE Quick Girth Total 184.3  -LB         Manual Lymphatic Drainage    Manual Therapy issued video instructing in self MLD  -LB         Compression/Skin Care    Compression/Skin Care Comments reviewed current POC, discussed options for compression including CDT, reduction kit, return to compression garments  -LB                User Key  (r) = Recorded By, (t) = Taken By, (c) = Cosigned By      Initials Name Provider Type    Lolita Frausto PT Physical Therapist                                    Therapy Education  Education Details: discussed compression garment options, CDT, reduction kit, return to compression garment wear, reviewed current management pain  Given: Symptoms/condition management, Edema management  Program: New, Reinforced  How Provided: Verbal  Provided to: Patient  Level of Understanding: Verbalized  35306 - PT Self Care/Mgmt Minutes: 15       OP Exercises       Row Name 08/30/23 0800             Subjective Comments    Subjective Comments I am just here to kind of check on things and see if anything has gotten worse.  -LB         Subjective Pain    Able to rate subjective pain? yes  -LB      Pre-Treatment Pain Level 2  -LB                User Key  (r) = Recorded By, (t) = Taken By, (c) = Cosigned By      Initials Name Provider Type    AGAPITO Yousif  Lolita, PT Physical Therapist                                 PT OP Goals       Row Name 08/30/23 1200          Long Term Goals    LTG Date to Achieve 09/29/23  -LB     LTG 1 Pt will be independent with conitnued management of LUE lymphedema.  -LB     LTG 1 Progress New  -LB     LTG 2 Pt will demonstrate a net dec in LUE cirucmferential measurements by >6cm.  -LB     LTG 2 Progress New  -LB        Time Calculation    PT Goal Re-Cert Due Date 11/28/23  -LB               User Key  (r) = Recorded By, (t) = Taken By, (c) = Cosigned By      Initials Name Provider Type    LB Lolita Yousif, BENJA Physical Therapist                     PT Assessment/Plan       Row Name 08/30/23 1247          PT Assessment    Functional Limitations Limitation in home management;Limitations in community activities;Performance in leisure activities;Performance in self-care ADL;Other (comment)  -LB     Impairments Impaired lymphatic circulation;Edema  -LB     Assessment Comments Pt [facility] to [facility] Lymphedema clinic. She has complex hx of B mastectomy with L axillary dissection (8/15 LNs) and R SLNB in 2009. She has since had LUE lymphedema Stage II. She currently uses pneumatic pump daily and wears Kinesiotape to address hand swelling primarily. She states she previously was fitted for LUE compression both OTC and custom and they did not work for her. She returns for reassessment of condition. Pt is new to me so we did circumferential measurements today and plan to repeat at next visit. Due to current lymphedema suggested CDT vs. trial of reduction kit vs. CDT followed by return to Ktape to determine ability to maintain. Pt prefers to continue current management plan now and if UE changes over next few months she will return to clinic and decide next steps. Pt remains appropriate for continued skilled PT services to address LUE deficits as needed and adjust POC as needed.  -LB     Rehab Potential Good  -LB     Patient/caregiver participated in  establishment of treatment plan and goals Yes  -LB     Patient would benefit from skilled therapy intervention Yes  -LB        PT Plan    PT Frequency 1x/week;2x/week;3x/week  -LB     Predicted Duration of Therapy Intervention (PT) depending on pt preference 2-12 visits  -LB     Planned CPT's? PT EVAL LOW COMPLEXITY: 64326;PT RE-EVAL: 97925;PT THER PROC EA 15 MIN: 06456;PT THER ACT EA 15 MIN: 26451;PT MANUAL THERAPY EA 15 MIN: 77405;PT SELF CARE/HOME MGMT/TRAIN EA 15: 19090;PT BIS XTRACELL FLUID ANALYSIS: 11555  -LB     PT Plan Comments repeat circumferential measurements, adjust compression/treatment if pt willing/if LUE girth increases  -LB               User Key  (r) = Recorded By, (t) = Taken By, (c) = Cosigned By      Initials Name Provider Type    Lolita Frausto, BENJA Physical Therapist                       Outcome Measure Options: Quick DASH  Quick DASH  Open a tight or new jar.: No Difficulty  Do heavy household chores (e.g., wash walls, wash floors): No Difficulty  Carry a shopping bag or briefcase: No Difficulty  Wash your back: No Difficulty  Use a knife to cut food: No Difficulty  Recreational activities in which you take some force or impact through your arm, should or hand (e.g. golf, hammering, tennis, etc.): No Difficulty  During the past week, to what extent has your arm, shoulder, or hand problem interfered with your normal social activites with family, friends, neighbors or groups?: Not at all  During the past week, were you limited in your work or other regular daily activities as a result of your arm, shoulder or hand problem?: Not limited at all  Arm, Shoulder, or hand pain: None  Tingling (pins and needles) in your arm, shoulder, or hand: None  During the past week, how much difficulty have you had sleeping because of the pain in your arm, shoulder or hand?: No difficulty  Number of Questions Answered: 11  Quick DASH Score: 0         Time Calculation:   Start Time: 0830  Stop Time: 0915  Time  Calculation (min): 45 min  Total Timed Code Minutes- PT: 15 minute(s)  Timed Charges  79668 - PT Self Care/Mgmt Minutes: 15  Total Minutes  Timed Charges Total Minutes: 15   Total Minutes: 15   Therapy Charges for Today       Code Description Service Date Service Provider Modifiers Qty    76368072536 HC PT SELF CARE/MGMT/TRAIN EA 15 MIN 8/30/2023 Lolita Yousif, PT GP 1    87914530711 HC PT EVAL LOW COMPLEXITY 2 8/30/2023 Lolita Yousif, PT GP 1            PT G-Codes  Outcome Measure Options: Quick DASH  Quick DASH Score: 0         Lolita Yousif PT  8/30/2023

## 2023-10-02 ENCOUNTER — HOSPITAL ENCOUNTER (OUTPATIENT)
Dept: BONE DENSITY | Facility: HOSPITAL | Age: 73
Discharge: HOME | End: 2023-10-02
Admitting: STUDENT IN AN ORGANIZED HEALTH CARE EDUCATION/TRAINING PROGRAM
Payer: MEDICARE

## 2023-10-02 DIAGNOSIS — N95.8 OTHER SPECIFIED MENOPAUSAL AND PERIMENOPAUSAL DISORDERS: ICD-10-CM

## 2023-10-02 PROCEDURE — 77080 DXA BONE DENSITY AXIAL: CPT

## 2023-11-14 ENCOUNTER — TELEPHONE (OUTPATIENT)
Dept: FAMILY MEDICINE CLINIC | Facility: CLINIC | Age: 73
End: 2023-11-14
Payer: MEDICARE

## 2023-11-14 NOTE — TELEPHONE ENCOUNTER
I would recommend all 3 vaccines. I would not recommend getting flu and COVID at that same time. Any other combination is fine, however.

## 2023-11-14 NOTE — TELEPHONE ENCOUNTER
Pt called in wanting to know if she should get the RSV, FLU, and COVID vaccines.    She had COVID back in June.     Please advise.

## 2023-11-30 ENCOUNTER — HOSPITAL ENCOUNTER (OUTPATIENT)
Dept: PHYSICAL THERAPY | Facility: HOSPITAL | Age: 73
Setting detail: THERAPIES SERIES
Discharge: HOME | End: 2023-11-30
Payer: MEDICARE

## 2023-11-30 DIAGNOSIS — Z17.1 MALIGNANT NEOPLASM OF UPPER-OUTER QUADRANT OF LEFT BREAST IN FEMALE, ESTROGEN RECEPTOR NEGATIVE: Primary | ICD-10-CM

## 2023-11-30 DIAGNOSIS — I89.0 LYMPHEDEMA OF UPPER EXTREMITY: ICD-10-CM

## 2023-11-30 DIAGNOSIS — C50.412 MALIGNANT NEOPLASM OF UPPER-OUTER QUADRANT OF LEFT BREAST IN FEMALE, ESTROGEN RECEPTOR NEGATIVE: Primary | ICD-10-CM

## 2023-11-30 PROCEDURE — 97164 PT RE-EVAL EST PLAN CARE: CPT

## 2023-11-30 PROCEDURE — 97535 SELF CARE MNGMENT TRAINING: CPT

## 2023-11-30 NOTE — THERAPY RE-EVALUATION
Physical Therapy Lymphedema Re-Evaluation  [facility]     Patient Name: America Crews  : 1950  MRN: [id number]  Today's Date: 2023      Visit Date: 2023    Visit Dx:    ICD-10-CM ICD-9-CM   1. Malignant neoplasm of upper-outer quadrant of left breast in female, estrogen receptor negative  C50.412 174.4    Z17.1 V86.1   2. Lymphedema of upper extremity  I89.0 457.1       Patient Active Problem List   Diagnosis    Breast cancer    Lymphedema of upper extremity    Medicare annual wellness visit, subsequent    Screen for colon cancer    Elevated BP without diagnosis of hypertension        Past Medical History:   Diagnosis Date    Breast cancer     Left    Colon polyp     Fibroids     H/O abnormal mammogram     with biopsy showing lobular carcinoma in situ    H/O left breast biopsy     History of medical problems     Hx of bilateral mastectomy 2009    Lymphedema 2010    Ovarian cysts     Skin cancer     Nose        Past Surgical History:   Procedure Laterality Date    BREAST BIOPSY Left 1990    BREAST BIOPSY Left 2009    BREAST BIOPSY Right     BREAST SURGERY Bilateral     lobular carcinoma in situ  excision    COLONOSCOPY  2010    Dr Clarke    COLONOSCOPY N/A 2021    Procedure: COLONOSCOPY to cecum and TI with cold polypectomy and cold snare polypectomy;  Surgeon: Blaise Clarke MD;  Location: [facility] ENDOSCOPY;  Service: Gastroenterology;  Laterality: N/A;  pre: screening  Post: diverticulosis, polyps    COLONOSCOPY  2021    HYSTEROSCOPY  2000    LYMPH NODE BIOPSY  2009    MASTECTOMY Bilateral        Visit Dx:    ICD-10-CM ICD-9-CM   1. Malignant neoplasm of upper-outer quadrant of left breast in female, estrogen receptor negative  C50.412 174.4    Z17.1 V86.1   2. Lymphedema of upper extremity  I89.0 457.1            Lymphedema       Row Name 23 0900             Subjective Pain    Able to rate subjective pain? yes  -LB       Pre-Treatment Pain Level 0  -LB         Subjective    Subjective Comments I think I am doing well. I am about the same as far as I can tell. I am using the pump and continue to tape. I have done bandaging in the past and it didn't seem to decrease in size compared to where it is today.  -LB         Lymphedema Assessment    Lymphedema Classification secondary;LUE:;stage 2 (Spontaneously Irreversible);RUE:;stage 1 (Spontaneously Reversible)  -LB      Lymphedema Cancer Related Sx axillary dissection;radical mastectomy;sentinel node biopsy;simple mastectomy  -LB      Lymphedema Surgery Comments 2009  -LB      Lymph Nodes Removed # 15  -LB      Positive Lymph Nodes # 8  -LB      Chemo Received yes  -LB      Radiation Therapy Received yes  -LB      Infections or Cellulitis? no  -LB         Posture/Observations    Posture/Observations Comments LUE lymphedema  -LB         Lymphedema Edema Assessment    Ptting Edema Category By grade out of 4  -LB      Pitting Edema + 2/4;Mild;Moderate  left  -LB         Lymphedema Sensation    Lymphedema Sensation Tests light touch  -LB      Lymphedema Light Touch LUE:;mild impairment;RUE:;WNL  -LB         Lymphedema Pulses/Capillary Refill    Lymphedema Pulses/Capillary Refill capillary refill  -LB      Capillary Refill upper extremity capillary refill  -LB      Upper Extremity Capillary Refill right:;left:;less than 3 seconds  -LB         Lymphedema Measurements    Measurement Type(s) Circumferential  -LB      Circumferential Areas Upper extremities  -LB         LUE Quick Girth (cm)    Axilla 37.2 cm  -LB      Mid upper arm 36.9 cm  -LB      Elbow 32.5 cm  -LB      Mid forearm 31.1 cm  -LB      Wrist crease 20.5 cm  -LB      Web space 22 cm  -LB      Met-heads 20 cm  -LB      LUE Quick Girth Total 200.2  -LB         RUE Quick Girth (cm)    Axilla 36.1 cm  -LB      Mid upper arm 33.6 cm  -LB      Elbow 28.5 cm  -LB      Mid forearm 27.1 cm  -LB      Wrist crease 16.5 cm  -LB      Web space  19.3 cm  -LB      Met-heads 18.5 cm  -LB      RUE Quick Girth Total 179.6  -LB         Compression/Skin Care    Compression/Skin Care Comments reviewed current POC, discussed options for compression including CDT, reduction kit, return to compression garments  -LB                User Key  (r) = Recorded By, (t) = Taken By, (c) = Cosigned By      Initials Name Provider Type    Lolita Frausto, PT Physical Therapist                                    Therapy Education  Education Details: discussed compression garment options, CDT, reduction kit, return to compression garment wear, reviewed current management pain  Given: Symptoms/condition management, Edema management  Program: Reinforced  How Provided: Verbal  Provided to: Patient  Level of Understanding: Verbalized  52554 - PT Self Care/Mgmt Minutes: 15       OP Exercises       Row Name 11/30/23 0900             Subjective    Subjective Comments I think I am doing well. I am about the same as far as I can tell. I am using the pump and continue to tape. I have done bandaging in the past and it didn't seem to decrease in size compared to where it is today.  -LB         Subjective Pain    Able to rate subjective pain? yes  -LB      Pre-Treatment Pain Level 0  -LB                User Key  (r) = Recorded By, (t) = Taken By, (c) = Cosigned By      Initials Name Provider Type    Lolita Frausto, BENJA Physical Therapist                                 PT OP Goals       Row Name 11/30/23 1200          Long Term Goals    LTG Date to Achieve 09/29/23  -LB     LTG 1 Pt will be independent with continued management of LUE lymphedema.  -LB     LTG 1 Progress Ongoing  -LB     LTG 1 Progress Comments Pt independent with current management plan.  -LB     LTG 2 Pt will demonstrate a net dec in LUE cirucmferential measurements by >6cm.  -LB     LTG 2 Progress Ongoing  -LB     LTG 2 Progress Comments Net circumference did dec by 3 cm. however, pt has lost weight and difference in net  circumference L compared to R remained stable  -LB               User Key  (r) = Recorded By, (t) = Taken By, (c) = Cosigned By      Initials Name Provider Type    LB Lolita Yousif, PT Physical Therapist                     PT Assessment/Plan       Row Name 11/30/23 0608          PT Assessment    Functional Limitations Limitation in home management;Limitations in community activities;Performance in leisure activities;Performance in self-care ADL;Other (comment)  -LB     Impairments Impaired lymphatic circulation;Edema  -LB     Assessment Comments Pt returns for reassessment of LUE lymphedema. She continues to demonstrate inc LUE lymphedema throughout LUE hand, forearm, upper arm. Repeat circumferential measurements B are slightly dec L compared to L 3 months ago, however the difference between L and R limbs is stable. Pt states she has lost weight in last 3 months most likely causing net dec. Pt continues to use compression pump daily and Kinesiotape. She states she was previously treated with full CDT with no real improvement in condition and feels comfortable with current management. We reviewed precautions and encouraged protection of LUE. Pt will return in 3 months to repeat assessment and ensure stability.  -LB     Rehab Potential Good  -LB     Patient/caregiver participated in establishment of treatment plan and goals Yes  -LB     Patient would benefit from skilled therapy intervention Yes  -LB        PT Plan    PT Frequency Other (comment)  -LB     Predicted Duration of Therapy Intervention (PT) return in 3 months  -LB     Planned CPT's? PT EVAL LOW COMPLEXITY: 49296;PT RE-EVAL: 63822;PT THER PROC EA 15 MIN: 56873;PT THER ACT EA 15 MIN: 11701;PT MANUAL THERAPY EA 15 MIN: 00683;PT NEUROMUSC RE-EDUCATION EA 15 MIN: 04734;PT SELF CARE/HOME MGMT/TRAIN EA 15: 12402;PT BIS XTRACELL FLUID ANALYSIS: 08878  -LB     PT Plan Comments repeat circumferential measurements, adjust treatment/initiate CDT if inc in girth or  pt wishes to begin  -AGAPITO               User Key  (r) = Recorded By, (t) = Taken By, (c) = Cosigned By      Initials Name Provider Type    Lolita Frausto, PT Physical Therapist                                 Time Calculation:   Start Time: 0915  Stop Time: 0945  Time Calculation (min): 30 min  Total Timed Code Minutes- PT: 15 minute(s)  Timed Charges  09423 - PT Self Care/Mgmt Minutes: 15  Total Minutes  Timed Charges Total Minutes: 15   Total Minutes: 15   Therapy Charges for Today       Code Description Service Date Service Provider Modifiers Qty    78957931462  PT SELF CARE/MGMT/TRAIN EA 15 MIN 11/30/2023 Lolita Yousif, PT GP 1    [id number]  PT RE-EVAL ESTABLISHED PLAN 2 11/30/2023 Lolita Yousif, PT GP 1                      Lolita Yousif PT  11/30/2023

## 2024-01-31 ENCOUNTER — OFFICE VISIT (OUTPATIENT)
Dept: FAMILY MEDICINE CLINIC | Facility: CLINIC | Age: 74
End: 2024-01-31
Payer: MEDICARE

## 2024-01-31 VITALS
BODY MASS INDEX: 36.06 KG/M2 | WEIGHT: 191 LBS | TEMPERATURE: 97.1 F | OXYGEN SATURATION: 99 % | SYSTOLIC BLOOD PRESSURE: 140 MMHG | HEART RATE: 71 BPM | HEIGHT: 61 IN | DIASTOLIC BLOOD PRESSURE: 80 MMHG

## 2024-01-31 DIAGNOSIS — Z00.00 MEDICARE ANNUAL WELLNESS VISIT, SUBSEQUENT: Primary | ICD-10-CM

## 2024-01-31 PROCEDURE — 1170F FXNL STATUS ASSESSED: CPT | Performed by: STUDENT IN AN ORGANIZED HEALTH CARE EDUCATION/TRAINING PROGRAM

## 2024-01-31 PROCEDURE — 1159F MED LIST DOCD IN RCRD: CPT | Performed by: STUDENT IN AN ORGANIZED HEALTH CARE EDUCATION/TRAINING PROGRAM

## 2024-01-31 PROCEDURE — G0439 PPPS, SUBSEQ VISIT: HCPCS | Performed by: STUDENT IN AN ORGANIZED HEALTH CARE EDUCATION/TRAINING PROGRAM

## 2024-01-31 PROCEDURE — 1160F RVW MEDS BY RX/DR IN RCRD: CPT | Performed by: STUDENT IN AN ORGANIZED HEALTH CARE EDUCATION/TRAINING PROGRAM

## 2024-01-31 NOTE — PROGRESS NOTES
The ABCs of the Annual Wellness Visit  Subsequent Medicare Wellness Visit    Chief Complaint   Patient presents with    Medicare Wellness-subsequent     Pt complains of her left thumb shaking       Subjective    History of Present Illness:  America Crews is a 73 y.o. female who presents for a Subsequent Medicare Wellness Visit.    The following portions of the patient's history were reviewed and   updated as appropriate: allergies, current medications, past family history, past medical history, past social history, past surgical history, and problem list.    Compared to one year ago, the patient feels her physical   health is the same.    Compared to one year ago, the patient feels her mental   health is the same.    Recent Hospitalizations:  She was not admitted to the hospital during the last year.       Current Medical Providers:  Patient Care Team:  Naz Lubin MD as PCP - General (Family Medicine)  Morgan Valdez MD as Referring Physician (Breast Surgery)  Urbano Nettles MD as Consulting Physician (Hematology and Oncology)  Chino Flood MD as Consulting Physician (Hematology and Oncology)    Outpatient Medications Prior to Visit   Medication Sig Dispense Refill    albuterol sulfate  (90 Base) MCG/ACT inhaler Inhale 2 puffs Every 4 (Four) Hours As Needed for Shortness of Air. 8 g 0    Elderberry 575 MG/5ML syrup Take  by mouth.      Multiple Vitamins-Minerals (Emergen-C Vitamin C) pack Take  by mouth.      multivitamin (THERAGRAN) tablet tablet Take  by mouth Daily.      azithromycin (Zithromax Z-Nabeel) 250 MG tablet Take 2 tablets the first day, then 1 tablet daily for 4 days. (Patient not taking: Reported on 1/31/2024) 6 tablet 0     No facility-administered medications prior to visit.       No opioid medication identified on active medication list. I have reviewed chart for other potential  high risk medication/s and harmful drug interactions in the elderly.        Aspirin is not  "on active medication list.  Aspirin use is not indicated based on review of current medical condition/s. Risk of harm outweighs potential benefits.  .    Patient Active Problem List   Diagnosis    Breast cancer    Lymphedema of upper extremity    Medicare annual wellness visit, subsequent    Screen for colon cancer    Elevated BP without diagnosis of hypertension     Advance Care Planning  Advance Directive is on file.  ACP discussion was held with the patient during this visit. Patient has an advance directive in EMR which is still valid.           Objective    Vitals:    01/31/24 1256   BP: 140/80   BP Location: Right arm   Patient Position: Sitting   Cuff Size: Adult   Pulse: 71   Temp: 97.1 °F (36.2 °C)   TempSrc: Infrared   SpO2: 99%   Weight: 86.6 kg (191 lb)   Height: 154.9 cm (60.98\")   PainSc: 0-No pain     Estimated body mass index is 36.11 kg/m² as calculated from the following:    Height as of this encounter: 154.9 cm (60.98\").    Weight as of this encounter: 86.6 kg (191 lb).           Does the patient have evidence of cognitive impairment? No    Physical Exam  Constitutional:       General: She is not in acute distress.  Eyes:      Conjunctiva/sclera: Conjunctivae normal.   Cardiovascular:      Rate and Rhythm: Normal rate and regular rhythm.   Pulmonary:      Effort: Pulmonary effort is normal. No respiratory distress.      Breath sounds: Normal breath sounds.   Abdominal:      Palpations: Abdomen is soft. There is no mass.      Tenderness: There is no abdominal tenderness.   Musculoskeletal:      Comments: Lymphedema of RUE   Skin:     General: Skin is warm and dry.   Neurological:      Mental Status: She is alert and oriented to person, place, and time.      Motor: No weakness.      Gait: Gait normal.      Comments: Tremor of right thumb at rest, not with intention   Psychiatric:         Mood and Affect: Mood normal.         Behavior: Behavior normal.                 HEALTH RISK " ASSESSMENT    Smoking Status:  Social History     Tobacco Use   Smoking Status Never    Passive exposure: Never   Smokeless Tobacco Never     Alcohol Consumption:  Social History     Substance and Sexual Activity   Alcohol Use Yes    Comment: Seldom     Fall Risk Screen:    AVINASHADI Fall Risk Assessment was completed, and patient is at LOW risk for falls.Assessment completed on:2024    Depression Screenin/31/2024     1:00 PM   PHQ-2/PHQ-9 Depression Screening   Little Interest or Pleasure in Doing Things 0-->not at all   Feeling Down, Depressed or Hopeless 0-->not at all   PHQ-9: Brief Depression Severity Measure Score 0       Health Habits and Functional and Cognitive Screenin/31/2024     8:00 PM   Functional & Cognitive Status   Do you have difficulty preparing food and eating? No   Do you have difficulty bathing yourself, getting dressed or grooming yourself? No   Do you have difficulty using the toilet? No   Do you have difficulty moving around from place to place? No   Do you have trouble with steps or getting out of a bed or a chair? No   Current Diet Well Balanced Diet   Dental Exam Up to date   Eye Exam Up to date   Exercise (times per week) 7 times per week   Current Exercises Include Yard Work   Do you need help using the phone?  No   Are you deaf or do you have serious difficulty hearing?  No   Do you need help to go to places out of walking distance? No   Do you need help shopping? No   Do you need help preparing meals?  No   Do you need help with housework?  No   Do you need help with laundry? No   Do you need help taking your medications? No   Do you need help managing money? No   Do you ever drive or ride in a car without wearing a seat belt? No   Have you felt unusual stress, anger or loneliness in the last month? No   Who do you live with? Alone   If you need help, do you have trouble finding someone available to you? No       Age-appropriate Screening Schedule:  Refer to the  list below for future screening recommendations based on patient's age, sex and/or medical conditions. Orders for these recommended tests are listed in the plan section. The patient has been provided with a written plan.    Health Maintenance   Topic Date Due    COVID-19 Vaccine (5 - 2023-24 season) 09/01/2023    BMI FOLLOWUP  08/10/2024    ANNUAL WELLNESS VISIT  01/31/2025    DXA SCAN  10/02/2025    COLORECTAL CANCER SCREENING  06/14/2026    TDAP/TD VACCINES (2 - Td or Tdap) 06/13/2027    INFLUENZA VACCINE  Completed    Pneumococcal Vaccine 65+  Completed    HEPATITIS C SCREENING  Discontinued    ZOSTER VACCINE  Discontinued              Assessment & Plan   CMS Preventative Services Quick Reference  Risk Factors Identified During Encounter  Immunizations Discussed/Encouraged: COVID19  Dental Screening Recommended  Vision Screening Recommended  The above risks/problems have been discussed with the patient.  Follow up actions/plans if indicated are seen below in the Assessment/Plan Section.  Pertinent information has been shared with the patient in the After Visit Summary.    Diagnoses and all orders for this visit:    1. Medicare annual wellness visit, subsequent (Primary)  Assessment & Plan:  Colonoscopy: Last 6/2021 with 5yr repeat  Mammogram: s/p double mastectomy   LDCT: never smoker  DEXA: indicated at age 65    Immunizations: eligible for COVID  Labs: reviewed today  The 10-year ASCVD risk score (Nahum GIMENEZ, et al., 2019) is: 18.8%    Values used to calculate the score:      Age: 73 years      Sex: Female      Is Non- : No      Diabetic: No      Tobacco smoker: No      Systolic Blood Pressure: 140 mmHg      Is BP treated: No      HDL Cholesterol: 59 mg/dL      Total Cholesterol: 195 mg/dL      Patient was counseled in regards to maintaining a healthy lifestyle, rich in whole grains, fruits and vegetables. Limit high saturated fats and processed sugars. Maintain an active lifestyle to  promote overall health and well being.             Follow Up:   No follow-ups on file.     An After Visit Summary and PPPS were made available to the patient.

## 2024-02-01 NOTE — ASSESSMENT & PLAN NOTE
Colonoscopy: Last 6/2021 with 5yr repeat  Mammogram: s/p double mastectomy   LDCT: never smoker  DEXA: indicated at age 63    Immunizations: eligible for COVID  Labs: reviewed today  The 10-year ASCVD risk score (Nahum GIMENEZ, et al., 2019) is: 18.8%    Values used to calculate the score:      Age: 73 years      Sex: Female      Is Non- : No      Diabetic: No      Tobacco smoker: No      Systolic Blood Pressure: 140 mmHg      Is BP treated: No      HDL Cholesterol: 59 mg/dL      Total Cholesterol: 195 mg/dL      Patient was counseled in regards to maintaining a healthy lifestyle, rich in whole grains, fruits and vegetables. Limit high saturated fats and processed sugars. Maintain an active lifestyle to promote overall health and well being.

## 2024-02-28 ENCOUNTER — HOSPITAL ENCOUNTER (OUTPATIENT)
Dept: PHYSICAL THERAPY | Facility: HOSPITAL | Age: 74
Setting detail: THERAPIES SERIES
Discharge: HOME | End: 2024-02-28
Payer: MEDICARE

## 2024-02-28 DIAGNOSIS — Z00.00 MEDICARE ANNUAL WELLNESS VISIT, SUBSEQUENT: Primary | ICD-10-CM

## 2024-02-28 DIAGNOSIS — I89.0 LYMPHEDEMA OF UPPER EXTREMITY: ICD-10-CM

## 2024-02-28 DIAGNOSIS — C50.412 MALIGNANT NEOPLASM OF UPPER-OUTER QUADRANT OF LEFT BREAST IN FEMALE, ESTROGEN RECEPTOR NEGATIVE: ICD-10-CM

## 2024-02-28 DIAGNOSIS — Z17.1 MALIGNANT NEOPLASM OF UPPER-OUTER QUADRANT OF LEFT BREAST IN FEMALE, ESTROGEN RECEPTOR NEGATIVE: ICD-10-CM

## 2024-02-28 PROCEDURE — 97535 SELF CARE MNGMENT TRAINING: CPT

## 2024-02-28 PROCEDURE — 97164 PT RE-EVAL EST PLAN CARE: CPT

## 2024-02-28 NOTE — THERAPY RE-EVALUATION
Physical Therapy Lymphedema Re-Evaluation  [facility]     Patient Name: America Crews  : 1950  MRN: [id number]  Today's Date: 2024      Visit Date: 2024    Visit Dx:  No diagnosis found.    Patient Active Problem List   Diagnosis    Breast cancer    Lymphedema of upper extremity    Medicare annual wellness visit, subsequent    Screen for colon cancer    Elevated BP without diagnosis of hypertension        Past Medical History:   Diagnosis Date    Breast cancer     Left    Colon polyp     Fibroids     H/O abnormal mammogram     with biopsy showing lobular carcinoma in situ    H/O left breast biopsy     History of medical problems     Hx of bilateral mastectomy 2009    Lymphedema 2010    Ovarian cysts     Skin cancer     Nose        Past Surgical History:   Procedure Laterality Date    BREAST BIOPSY Left     BREAST BIOPSY Left 2009    BREAST BIOPSY Right     BREAST SURGERY Bilateral     lobular carcinoma in situ  excision    COLONOSCOPY  2010    Dr Clarke    COLONOSCOPY N/A 2021    Procedure: COLONOSCOPY to cecum and TI with cold polypectomy and cold snare polypectomy;  Surgeon: Blaise Clarke MD;  Location: [facility] ENDOSCOPY;  Service: Gastroenterology;  Laterality: N/A;  pre: screening  Post: diverticulosis, polyps    COLONOSCOPY  2021    HYSTEROSCOPY  2000    LYMPH NODE BIOPSY  2009    MASTECTOMY Bilateral 2009       Visit Dx:  No diagnosis found.         Lymphedema       Row Name 24 0700             Subjective Pain    Able to rate subjective pain? yes  -LB      Pre-Treatment Pain Level 0  -LB         Subjective    Subjective Comments I am doing ok. I think my pump could use some updates. I have had it awhile and the velcro is wearing off.  -LB         Lymphedema Assessment    Lymphedema Classification secondary;LUE:;stage 2 (Spontaneously Irreversible);RUE:;stage 1 (Spontaneously Reversible)  -LB      Lymphedema Cancer Related Sx  axillary dissection;radical mastectomy;sentinel node biopsy;simple mastectomy  -LB      Lymphedema Surgery Comments 2009  -LB      Lymph Nodes Removed # 15  -LB      Positive Lymph Nodes # 8  -LB      Chemo Received yes  -LB      Radiation Therapy Received yes  -LB      Infections or Cellulitis? no  -LB         Posture/Observations    Posture/Observations Comments LUE lymphedema  -LB         Lymphedema Edema Assessment    Ptting Edema Category By grade out of 4  -LB      Pitting Edema + 2/4;Mild;Moderate  left  -LB         Lymphedema Sensation    Lymphedema Sensation Tests light touch  -LB      Lymphedema Light Touch LUE:;mild impairment;RUE:;WNL  -LB         Lymphedema Pulses/Capillary Refill    Lymphedema Pulses/Capillary Refill capillary refill  -LB      Capillary Refill upper extremity capillary refill  -LB      Upper Extremity Capillary Refill right:;left:;less than 3 seconds  -LB         Lymphedema Measurements    Measurement Type(s) Circumferential  -LB      Circumferential Areas Upper extremities  -LB         LUE Quick Girth (cm)    Axilla 38.5 cm  -LB      Mid upper arm 37.7 cm  -LB      Elbow 34 cm  -LB      Mid forearm 32.2 cm  -LB      Wrist crease 21.2 cm  -LB      Web space 22.5 cm  -LB      Met-heads 20.2 cm  -LB      LUE Quick Girth Total 206.3  -LB         RUE Quick Girth (cm)    Axilla 37.2 cm  -LB      Mid upper arm 36 cm  -LB      Elbow 29.2 cm  -LB      Mid forearm 26 cm  -LB      Wrist crease 17.1 cm  -LB      Web space 20.4 cm  -LB      Met-heads 19.2 cm  -LB      RUE Quick Girth Total 185.1  -LB                User Key  (r) = Recorded By, (t) = Taken By, (c) = Cosigned By      Initials Name Provider Type    Lolita Frausto, PT Physical Therapist                                    Therapy Education  Education Details: discussed updating compression pump, CDT  Given: Symptoms/condition management, Edema management  Program: Reinforced  How Provided: Verbal  Provided to: Patient  Level of  Understanding: Verbalized  85285 - PT Self Care/Mgmt Minutes: 15       OP Exercises       Row Name 02/28/24 0700             Subjective    Subjective Comments I am doing ok. I think my pump could use some updates. I have had it awhile and the velcro is wearing off.  -LB         Subjective Pain    Able to rate subjective pain? yes  -LB      Pre-Treatment Pain Level 0  -LB                User Key  (r) = Recorded By, (t) = Taken By, (c) = Cosigned By      Initials Name Provider Type    Lolita Frausto, PT Physical Therapist                                 PT OP Goals       Row Name 02/28/24 0700          Long Term Goals    LTG Date to Achieve 09/29/23  -LB     LTG 1 Pt will be independent with continued management of LUE lymphedema.  -LB     LTG 1 Progress Ongoing  -LB     LTG 1 Progress Comments Pt independent with current management plan.  -LB     LTG 2 Pt will demonstrate a net dec in LUE cirucmferential measurements by >6cm.  -LB     LTG 2 Progress Ongoing  -LB     LTG 2 Progress Comments Stable BUE measurements differential, no loss either side comparatively  -LB               User Key  (r) = Recorded By, (t) = Taken By, (c) = Cosigned By      Initials Name Provider Type    Lolita Frausto, PT Physical Therapist                     PT Assessment/Plan       Row Name 02/28/24 0738          PT Assessment    Functional Limitations Limitation in home management;Limitations in community activities;Performance in leisure activities;Performance in self-care ADL;Other (comment)  -LB     Impairments Impaired lymphatic circulation;Edema  -LB     Assessment Comments Pt returns for reassessment of LUE lymphedema. She continues to demonstrate inc LUE lymphedema throughout LUE hand, forearm, upper arm. Repeat circumferential measurements B are inc but B inc are equivalent. therefore, the difference between L and R limbs is stable. Pt states she has gained a little weight in last 3 months most likely causing net inc. Pt  continues to use compression pump daily and Kinesiotape. We discussed updating compression pump as she has velcro attachments that are wearing out. She states she was previously treated with full CDT with no real improvement in condition and feels comfortable with current management. We reviewed precautions and encouraged protection of LUE. Pt will return in 6 months to repeat assessment and ensure stability.  -LB     Rehab Potential Good  -LB     Patient/caregiver participated in establishment of treatment plan and goals Yes  -LB     Patient would benefit from skilled therapy intervention Yes  -LB        PT Plan    PT Frequency Other (comment)  -LB     Predicted Duration of Therapy Intervention (PT) return in 6 months  -LB     Planned CPT's? PT EVAL LOW COMPLEXITY: 61941;PT THER PROC EA 15 MIN: 25641;PT RE-EVAL: 99668;PT THER ACT EA 15 MIN: 90550;PT NEUROMUSC RE-EDUCATION EA 15 MIN: 16610;PT MANUAL THERAPY EA 15 MIN: 80815;PT SELF CARE/HOME MGMT/TRAIN EA 15: 82486;PT BIS XTRACELL FLUID ANALYSIS: 75621  -LB     PT Plan Comments repeat circumferential measurements, adjust treatment/initiate CDT if inc in girth or pt wishes to begin  -LB               User Key  (r) = Recorded By, (t) = Taken By, (c) = Cosigned By      Initials Name Provider Type    Lolita Frausto PT Physical Therapist                                 Time Calculation:   Start Time: 0700  Stop Time: 0730  Time Calculation (min): 30 min  Total Timed Code Minutes- PT: 15 minute(s)  Timed Charges  78409 - PT Self Care/Mgmt Minutes: 15  Total Minutes  Timed Charges Total Minutes: 15   Total Minutes: 15   Therapy Charges for Today       Code Description Service Date Service Provider Modifiers Qty    [id number]  PT SELF CARE/MGMT/TRAIN EA 15 MIN 2/28/2024 Lolita Yousif, PT GP 1                      Lolita Yousif PT  2/28/2024

## 2024-08-14 ENCOUNTER — HOSPITAL ENCOUNTER (OUTPATIENT)
Dept: PHYSICAL THERAPY | Facility: HOSPITAL | Age: 74
Setting detail: THERAPIES SERIES
Discharge: HOME | End: 2024-08-14
Payer: MEDICARE

## 2024-08-14 DIAGNOSIS — Z17.1 MALIGNANT NEOPLASM OF UPPER-OUTER QUADRANT OF LEFT BREAST IN FEMALE, ESTROGEN RECEPTOR NEGATIVE: ICD-10-CM

## 2024-08-14 DIAGNOSIS — C50.412 MALIGNANT NEOPLASM OF UPPER-OUTER QUADRANT OF LEFT BREAST IN FEMALE, ESTROGEN RECEPTOR NEGATIVE: ICD-10-CM

## 2024-08-14 DIAGNOSIS — I89.0 LYMPHEDEMA OF UPPER EXTREMITY: Primary | ICD-10-CM

## 2024-08-14 PROCEDURE — 97535 SELF CARE MNGMENT TRAINING: CPT

## 2024-08-14 PROCEDURE — 97164 PT RE-EVAL EST PLAN CARE: CPT

## 2024-08-14 NOTE — THERAPY RE-EVALUATION
Physical Therapy Lymphedema Re-Evaluation  [facility]     Patient Name: America Crews  : 1950  MRN: [id number]  Today's Date: 2024      Visit Date: 2024    Visit Dx:    ICD-10-CM ICD-9-CM   1. Lymphedema of upper extremity  I89.0 457.1   2. Malignant neoplasm of upper-outer quadrant of left breast in female, estrogen receptor negative  C50.412 174.4    Z17.1 V86.1       Patient Active Problem List   Diagnosis    Breast cancer    Lymphedema of upper extremity    Medicare annual wellness visit, subsequent    Screen for colon cancer    Elevated BP without diagnosis of hypertension        Past Medical History:   Diagnosis Date    Breast cancer     Left    Colon polyp     Fibroids     H/O abnormal mammogram     with biopsy showing lobular carcinoma in situ    H/O left breast biopsy     History of medical problems     Hx of bilateral mastectomy 2009    Lymphedema 2010    Ovarian cysts     Skin cancer     Nose        Past Surgical History:   Procedure Laterality Date    BREAST BIOPSY Left 1990    BREAST BIOPSY Left 2009    BREAST BIOPSY Right     BREAST SURGERY Bilateral     lobular carcinoma in situ  excision    COLONOSCOPY  2010    Dr Clarke    COLONOSCOPY N/A 2021    Procedure: COLONOSCOPY to cecum and TI with cold polypectomy and cold snare polypectomy;  Surgeon: Blaise Clarke MD;  Location: [facility] ENDOSCOPY;  Service: Gastroenterology;  Laterality: N/A;  pre: screening  Post: diverticulosis, polyps    COLONOSCOPY  2021    HYSTEROSCOPY  2000    LYMPH NODE BIOPSY  2009    MASTECTOMY Bilateral        Visit Dx:    ICD-10-CM ICD-9-CM   1. Lymphedema of upper extremity  I89.0 457.1   2. Malignant neoplasm of upper-outer quadrant of left breast in female, estrogen receptor negative  C50.412 174.4    Z17.1 V86.1            Lymphedema       Row Name 24 0700             Subjective Pain    Able to rate subjective pain? yes  -LB       Pre-Treatment Pain Level 0  -LB         Subjective    Subjective Comments I am doing ok. I am traveling by car next month to [facility]. I am going to [place] next year in September.  -LB         Lymphedema Assessment    Lymphedema Classification secondary;LUE:;stage 2 (Spontaneously Irreversible);RUE:;stage 1 (Spontaneously Reversible)  -LB      Lymphedema Cancer Related Sx axillary dissection;radical mastectomy;sentinel node biopsy;simple mastectomy  -LB      Lymphedema Surgery Comments 2009  -LB      Lymph Nodes Removed # 15  -LB      Positive Lymph Nodes # 8  -LB      Chemo Received yes  -LB      Radiation Therapy Received yes  -LB      Infections or Cellulitis? no  -LB         Posture/Observations    Posture/Observations Comments LUE lymphedema  -LB         Lymphedema Edema Assessment    Ptting Edema Category By grade out of 4  -LB      Pitting Edema + 2/4;Moderate  left  -LB         Lymphedema Sensation    Lymphedema Sensation Tests light touch  -LB      Lymphedema Light Touch LUE:;mild impairment;RUE:;WNL  -LB         Lymphedema Measurements    Measurement Type(s) Circumferential  -LB      Circumferential Areas Upper extremities  -LB         LUE Quick Girth (cm)    Axilla 38.6 cm  -LB      Mid upper arm 38.5 cm  -LB      Elbow 33.5 cm  -LB      Mid forearm 32.1 cm  -LB      Wrist crease 20.5 cm  -LB      Web space 21.9 cm  -LB      Met-heads 20.2 cm  -LB      LUE Quick Girth Total 205.3  -LB         BUE Circumferential (cm)    Measurement Location 1 chest  -LB      Left 1 89 cm  -LB      Measurement Location 2 hips  -LB      Left 2 121 cm  -LB      LUE Circumferential Total 210 cm  -LB                User Key  (r) = Recorded By, (t) = Taken By, (c) = Cosigned By      Initials Name Provider Type    Lolita Frausto PT Physical Therapist                                    Therapy Education  Education Details: discussed use of pump, reduction kit, use of reduction kit with glove to avoid swelling in the hand,  travel considerations and POC following travel  Given: Symptoms/condition management, Edema management, HEP  Program: New, Reinforced  How Provided: Verbal, Demonstration  Provided to: Patient  Level of Understanding: Teach back education performed, Verbalized, Demonstrated  34484 - PT Self Care/Mgmt Minutes: 30       OP Exercises       Row Name 08/14/24 0700             Subjective    Subjective Comments I am doing ok. I am traveling by car next month to [facility]. I am going to [facility] next year in September.  -LB         Subjective Pain    Able to rate subjective pain? yes  -LB      Pre-Treatment Pain Level 0  -LB                User Key  (r) = Recorded By, (t) = Taken By, (c) = Cosigned By      Initials Name Provider Type    Lolita Frausto, PT Physical Therapist                                 PT OP Goals       Row Name 08/14/24 1000          Long Term Goals    LTG Date to Achieve 09/29/23  -LB     LTG 1 Pt will be independent with continued management of LUE lymphedema.  -LB     LTG 1 Progress Ongoing  -LB     LTG 1 Progress Comments reviewed current POC and discussed future POC  -LB     LTG 2 Pt will demonstrate a net dec in LUE cirucmferential measurements by >6cm.  -LB     LTG 2 Progress Ongoing  -LB     LTG 2 Progress Comments Stable to slightly inc LUE measurements, will begin formal treatment following travel.  -LB               User Key  (r) = Recorded By, (t) = Taken By, (c) = Cosigned By      Initials Name Provider Type    Lolita Frausto, PT Physical Therapist                     PT Assessment/Plan       Row Name 08/14/24 1032          PT Assessment    Functional Limitations Limitation in home management;Limitations in community activities;Performance in leisure activities;Performance in self-care ADL;Other (comment)  -LB     Impairments Impaired lymphatic circulation;Edema  -LB     Assessment Comments Pt returns for reassessment of LUE lymphedema. She continues to demonstrate inc LUE lymphedema  throughout LUE hand, forearm, upper arm. Repeat circumferential measurements B are inc slightly from last visit. Pt continues to use compression pump daily, exercising, and elevated LUE. We will begin reduction kit today 20-30 mmHg. She will travel next month and we will continue formal CDT at that point working towards most likely custom compression garment for LUE. We reviewed precautions for travel and use of compression and encouraged protection of LUE. Pt will benefit from initiation of formal CDT working around upcoming travel plans.  -LB     Rehab Potential Good  -LB     Patient/caregiver participated in establishment of treatment plan and goals Yes  -LB     Patient would benefit from skilled therapy intervention Yes  -LB        PT Plan    PT Frequency Other (comment)  -LB     Predicted Duration of Therapy Intervention (PT) 8-10 visits to begin formal CDT  -LB     Planned CPT's? PT EVAL LOW COMPLEXITY: 83271;PT RE-EVAL: 56120;PT THER PROC EA 15 MIN: 40907;PT THER ACT EA 15 MIN: 83483;PT MANUAL THERAPY EA 15 MIN: 50682;PT NEUROMUSC RE-EDUCATION EA 15 MIN: 64742;PT SELF CARE/HOME MGMT/TRAIN EA 15: 98575;PT BIS XTRACELL FLUID ANALYSIS: 90269  -LB     PT Plan Comments assess tolerance to pump, reduction kit, begin CDT, follow up after travel to begin formal treatment  -LB               User Key  (r) = Recorded By, (t) = Taken By, (c) = Cosigned By      Initials Name Provider Type    LB Lolita Yousif, PT Physical Therapist                                 Time Calculation:   Start Time: 0700  Stop Time: 0745  Time Calculation (min): 45 min  Total Timed Code Minutes- PT: 30 minute(s)  Timed Charges  24497 - PT Self Care/Mgmt Minutes: 30  Total Minutes  Timed Charges Total Minutes: 30   Total Minutes: 30   Therapy Charges for Today       Code Description Service Date Service Provider Modifiers Qty    51530938927 HC PT SELF CARE/MGMT/TRAIN EA 15 MIN 8/14/2024 Lolita Yousif, PT GP 2    68164886433 HC PT RE-EVAL  ESTABLISHED PLAN 2 8/14/2024 Lolita Yousif, PT GP 1                      Lolita Yousif, PT  8/14/2024

## 2024-08-27 ENCOUNTER — HOSPITAL ENCOUNTER (OUTPATIENT)
Dept: PHYSICAL THERAPY | Facility: HOSPITAL | Age: 74
Setting detail: THERAPIES SERIES
Discharge: HOME | End: 2024-08-27
Payer: MEDICARE

## 2024-08-27 DIAGNOSIS — Z17.1 MALIGNANT NEOPLASM OF UPPER-OUTER QUADRANT OF LEFT BREAST IN FEMALE, ESTROGEN RECEPTOR NEGATIVE: ICD-10-CM

## 2024-08-27 DIAGNOSIS — I89.0 LYMPHEDEMA OF UPPER EXTREMITY: Primary | ICD-10-CM

## 2024-08-27 DIAGNOSIS — C50.412 MALIGNANT NEOPLASM OF UPPER-OUTER QUADRANT OF LEFT BREAST IN FEMALE, ESTROGEN RECEPTOR NEGATIVE: ICD-10-CM

## 2024-08-27 PROCEDURE — 97535 SELF CARE MNGMENT TRAINING: CPT

## 2024-08-27 NOTE — THERAPY TREATMENT NOTE
Outpatient Physical Therapy Lymphedema Treatment Note  [facility]     Patient Name: America Crews  : 1950  MRN: [id number]  Today's Date: 2024        Visit Date: 2024    Visit Dx:    ICD-10-CM ICD-9-CM   1. Lymphedema of upper extremity  I89.0 457.1   2. Malignant neoplasm of upper-outer quadrant of left breast in female, estrogen receptor negative  C50.412 174.4    Z17.1 V86.1       Patient Active Problem List   Diagnosis    Breast cancer    Lymphedema of upper extremity    Medicare annual wellness visit, subsequent    Screen for colon cancer    Elevated BP without diagnosis of hypertension         Lymphedema       Row Name 24 0700             Subjective Pain    Able to rate subjective pain? yes  -LB      Pre-Treatment Pain Level 0  -LB         Subjective    Subjective Comments I am doing ok. The reduction kit isn't going to work for me. I tried it with the glove and I tried it with the bandaging and it just wouldn't work. I don't know if I will ever wear a compression sleeve. It wouldn't work before with my wrist.  -LB         Lymphedema Assessment    Lymphedema Classification secondary;LUE:;stage 2 (Spontaneously Irreversible);RUE:;stage 1 (Spontaneously Reversible)  -LB      Lymphedema Cancer Related Sx axillary dissection;radical mastectomy;sentinel node biopsy;simple mastectomy  -LB      Lymphedema Surgery Comments 2009  -LB      Lymph Nodes Removed # 15  -LB      Positive Lymph Nodes # 8  -LB      Chemo Received yes  -LB      Radiation Therapy Received yes  -LB      Infections or Cellulitis? no  -LB         Posture/Observations    Posture/Observations Comments LUE lymphedema  -LB         Lymphedema Edema Assessment    Ptting Edema Category By grade out of 4  -LB      Pitting Edema + 2/4;Moderate  left  -LB         Lymphedema Sensation    Lymphedema Sensation Tests light touch  -LB      Lymphedema Light Touch LUE:;mild impairment;RUE:;WNL  -LB         Lymphedema Measurements     Addended by: Katiana Morris on: 3/11/2022 12:19 PM     Modules accepted: Orders Measurement Type(s) Circumferential  -LB      Circumferential Areas Upper extremities  -LB         LUE Quick Girth (cm)    Axilla 38 cm  -LB      Mid upper arm 37.6 cm  -LB      Elbow 34.2 cm  -LB      Mid forearm 32 cm  -LB      Wrist crease 20.2 cm  -LB      Web space 22.6 cm  -LB      Met-heads 20.5 cm  -LB      LUE Quick Girth Total 205.1  -LB         Compression/Skin Care    Compression/Skin Care Comments encouraged continued pump use, consideration of full CDT but being aware that a daytime compression sleeve would be necessary to maintain smaller size  -LB                User Key  (r) = Recorded By, (t) = Taken By, (c) = Cosigned By      Initials Name Provider Type    Lolita Frausto, PT Physical Therapist                                   PT Assessment/Plan       Row Name 08/27/24 0827          PT Assessment    Assessment Comments Pt states reduction kit was not working for her due to issues with her hand. She attempted to wear glove as well as bandage and stated neither helped. She is not sure she will tolerate a compression sleeve following CDT. LUE circumferential measurements are stable today. We discussed continued use of pneumatic pump and decided to revisit CDT after her upcoming trip.  -LB        PT Plan    PT Plan Comments repeat circumferential measurements following massage, consider CDT after upcoming travel  -LB               User Key  (r) = Recorded By, (t) = Taken By, (c) = Cosigned By      Initials Name Provider Type    Lolita Frausto, PT Physical Therapist                        OP Exercises       Row Name 08/27/24 0700             Subjective    Subjective Comments I am doing ok. The reduction kit isn't going to work for me. I tried it with the glove and I tried it with the bandaging and it just wouldn't work. I don't know if I will ever wear a compression sleeve. It wouldn't work before with my wrist.  -LB         Subjective Pain    Able to rate subjective pain? yes  -LB       Pre-Treatment Pain Level 0  -LB                User Key  (r) = Recorded By, (t) = Taken By, (c) = Cosigned By      Initials Name Provider Type    Lolita Frausto PT Physical Therapist                                 PT OP Goals       Row Name 08/27/24 0800          Long Term Goals    LTG Date to Achieve 09/29/23  -LB     LTG 1 Pt will be independent with continued management of LUE lymphedema.  -LB     LTG 1 Progress Ongoing  -LB     LTG 2 Pt will demonstrate a net dec in LUE cirucmferential measurements by >6cm.  -LB     LTG 2 Progress Ongoing  -LB               User Key  (r) = Recorded By, (t) = Taken By, (c) = Cosigned By      Initials Name Provider Type    Lolita Frausto PT Physical Therapist                    Therapy Education  Education Details: reviewed daily use of pump, discussed compression garment use following CDT, CDT options  Given: Symptoms/condition management, Edema management, HEP  Program: Reinforced  How Provided: Verbal, Demonstration  Provided to: Patient  Level of Understanding: Verbalized  15937 - PT Self Care/Mgmt Minutes: 15              Time Calculation:   Start Time: 0745  Stop Time: 0800  Time Calculation (min): 15 min  Total Timed Code Minutes- PT: 15 minute(s)  Timed Charges  44140 - PT Self Care/Mgmt Minutes: 15  Total Minutes  Timed Charges Total Minutes: 15   Total Minutes: 15   Therapy Charges for Today       Code Description Service Date Service Provider Modifiers Qty    31670916172  PT SELF CARE/MGMT/TRAIN EA 15 MIN 8/27/2024 Lolita Yousif, PT GP 1                      Lolita Yousif PT  8/27/2024

## 2024-09-05 ENCOUNTER — HOSPITAL ENCOUNTER (OUTPATIENT)
Dept: PHYSICAL THERAPY | Facility: HOSPITAL | Age: 74
Setting detail: THERAPIES SERIES
Discharge: HOME | End: 2024-09-05
Payer: MEDICARE

## 2024-09-05 DIAGNOSIS — I89.0 LYMPHEDEMA OF UPPER EXTREMITY: Primary | ICD-10-CM

## 2024-09-05 DIAGNOSIS — Z17.1 MALIGNANT NEOPLASM OF UPPER-OUTER QUADRANT OF LEFT BREAST IN FEMALE, ESTROGEN RECEPTOR NEGATIVE: ICD-10-CM

## 2024-09-05 DIAGNOSIS — C50.412 MALIGNANT NEOPLASM OF UPPER-OUTER QUADRANT OF LEFT BREAST IN FEMALE, ESTROGEN RECEPTOR NEGATIVE: ICD-10-CM

## 2024-09-05 PROCEDURE — 97535 SELF CARE MNGMENT TRAINING: CPT

## 2024-09-05 NOTE — THERAPY RE-EVALUATION
Outpatient Physical Therapy Lymphedema Treatment Note  [facility]     Patient Name: America Crews  : 1950  MRN: [id number]  Today's Date: 2024        Visit Date: 2024    Visit Dx:    ICD-10-CM ICD-9-CM   1. Lymphedema of upper extremity  I89.0 457.1   2. Malignant neoplasm of upper-outer quadrant of left breast in female, estrogen receptor negative  C50.412 174.4    Z17.1 V86.1       Patient Active Problem List   Diagnosis    Breast cancer    Lymphedema of upper extremity    Medicare annual wellness visit, subsequent    Screen for colon cancer    Elevated BP without diagnosis of hypertension         Lymphedema       Row Name 24 0900             Subjective Pain    Able to rate subjective pain? yes  -LB      Pre-Treatment Pain Level 0  -LB         Subjective    Subjective Comments I am doing fine. I did get pretty sunburned this week. I leave next week for vacation. I am still using the pump.  -LB         Lymphedema Assessment    Lymphedema Classification secondary;LUE:;stage 2 (Spontaneously Irreversible);RUE:;stage 1 (Spontaneously Reversible)  -LB      Lymphedema Cancer Related Sx axillary dissection;radical mastectomy;sentinel node biopsy;simple mastectomy  -LB      Lymphedema Surgery Comments 2009  -LB      Lymph Nodes Removed # 15  -LB      Positive Lymph Nodes # 8  -LB      Chemo Received yes  -LB      Radiation Therapy Received yes  -LB      Infections or Cellulitis? no  -LB         Posture/Observations    Posture/Observations Comments LUE lymphedema  -LB         Lymphedema Edema Assessment    Ptting Edema Category By grade out of 4  -LB      Pitting Edema + 2/4;Moderate  left  -LB         Lymphedema Sensation    Lymphedema Sensation Tests light touch  -LB      Lymphedema Light Touch LUE:;mild impairment;RUE:;WNL  -LB         Lymphedema Measurements    Measurement Type(s) Circumferential  -LB      Circumferential Areas Upper extremities  -LB         LUE Quick Girth (cm)    Axilla  39 cm  -LB      Mid upper arm 38.7 cm  -LB      Elbow 33.5 cm  -LB      Mid forearm 31.5 cm  -LB      Wrist crease 19.5 cm  -LB      Web space 21.9 cm  -LB      Met-heads 19.9 cm  -LB      LUE Quick Girth Total 204  -LB                User Key  (r) = Recorded By, (t) = Taken By, (c) = Cosigned By      Initials Name Provider Type    Lolita Frausto, PT Physical Therapist                                   PT Assessment/Plan       Row Name 09/05/24 1422          PT Assessment    Assessment Comments Pt continues to be compliant with pneumatic pump. Stable LUE circumferential measurements today. Pt does have obvious sunburn L upper arm. Discussed sun protection, continued managment of LUE lymphedema. Pt will leave for vacation next week. Encouraged her to continue intiation of CDT following vacation.  -LB        PT Plan    PT Plan Comments consider CDT once pt returns from trip  -LB               User Key  (r) = Recorded By, (t) = Taken By, (c) = Cosigned By      Initials Name Provider Type    Lolita Frausto, PT Physical Therapist                        OP Exercises       Row Name 09/05/24 0900             Subjective    Subjective Comments I am doing fine. I did get pretty sunburned this week. I leave next week for vacation. I am still using the pump.  -LB         Subjective Pain    Able to rate subjective pain? yes  -LB      Pre-Treatment Pain Level 0  -LB                User Key  (r) = Recorded By, (t) = Taken By, (c) = Cosigned By      Initials Name Provider Type    Lolita Frausto PT Physical Therapist                                 PT OP Goals       Row Name 09/05/24 1400          Long Term Goals    LTG Date to Achieve 09/29/23  -LB     LTG 1 Pt will be independent with continued management of LUE lymphedema.  -LB     LTG 1 Progress Ongoing  -LB     LTG 2 Pt will demonstrate a net dec in LUE cirucmferential measurements by >6cm.  -LB     LTG 2 Progress Ongoing  -LB               User Key  (r) = Recorded By,  (t) = Taken By, (c) = Cosigned By      Initials Name Provider Type    Lolita Frausto PT Physical Therapist                    Therapy Education  Education Details: reviewed POC, consideration of CDT, continued use of pump  Given: Symptoms/condition management, Edema management  Program: Reinforced  How Provided: Verbal  Provided to: Patient  Level of Understanding: Verbalized  35681 - PT Self Care/Mgmt Minutes: 15              Time Calculation:   Start Time: 0915  Stop Time: 0935  Time Calculation (min): 20 min  Total Timed Code Minutes- PT: 15 minute(s)  Timed Charges  06318 - PT Self Care/Mgmt Minutes: 15  Total Minutes  Timed Charges Total Minutes: 15   Total Minutes: 15   Therapy Charges for Today       Code Description Service Date Service Provider Modifiers Qty    40710395182 HC PT SELF CARE/MGMT/TRAIN EA 15 MIN 9/5/2024 Loltia Yousif, PT GP 1                      Lolita Yousif PT  9/5/2024

## 2024-09-25 ENCOUNTER — HOSPITAL ENCOUNTER (OUTPATIENT)
Dept: PHYSICAL THERAPY | Facility: HOSPITAL | Age: 74
Setting detail: THERAPIES SERIES
Discharge: HOME | End: 2024-09-25
Payer: MEDICARE

## 2024-09-25 DIAGNOSIS — I89.0 LYMPHEDEMA OF UPPER EXTREMITY: Primary | ICD-10-CM

## 2024-09-25 DIAGNOSIS — Z17.1 MALIGNANT NEOPLASM OF UPPER-OUTER QUADRANT OF LEFT BREAST IN FEMALE, ESTROGEN RECEPTOR NEGATIVE: ICD-10-CM

## 2024-09-25 DIAGNOSIS — C50.412 MALIGNANT NEOPLASM OF UPPER-OUTER QUADRANT OF LEFT BREAST IN FEMALE, ESTROGEN RECEPTOR NEGATIVE: ICD-10-CM

## 2024-09-25 PROCEDURE — 97535 SELF CARE MNGMENT TRAINING: CPT

## 2024-11-27 ENCOUNTER — APPOINTMENT (OUTPATIENT)
Dept: PHYSICAL THERAPY | Facility: HOSPITAL | Age: 74
End: 2024-11-27
Payer: MEDICARE

## 2024-11-29 ENCOUNTER — HOSPITAL ENCOUNTER (OUTPATIENT)
Dept: PHYSICAL THERAPY | Facility: HOSPITAL | Age: 74
Setting detail: THERAPIES SERIES
Discharge: HOME | End: 2024-11-29
Payer: MEDICARE

## 2024-11-29 DIAGNOSIS — Z17.1 MALIGNANT NEOPLASM OF UPPER-OUTER QUADRANT OF LEFT BREAST IN FEMALE, ESTROGEN RECEPTOR NEGATIVE: ICD-10-CM

## 2024-11-29 DIAGNOSIS — I89.0 LYMPHEDEMA OF UPPER EXTREMITY: Primary | ICD-10-CM

## 2024-11-29 DIAGNOSIS — C50.412 MALIGNANT NEOPLASM OF UPPER-OUTER QUADRANT OF LEFT BREAST IN FEMALE, ESTROGEN RECEPTOR NEGATIVE: ICD-10-CM

## 2024-11-29 PROCEDURE — 97535 SELF CARE MNGMENT TRAINING: CPT

## 2024-12-09 ENCOUNTER — TELEPHONE (OUTPATIENT)
Dept: FAMILY MEDICINE CLINIC | Facility: CLINIC | Age: 74
End: 2024-12-09

## 2024-12-09 NOTE — TELEPHONE ENCOUNTER
Caller: America Crews    Relationship: Self    Best call back number: [phone]     What is the best time to reach you: ANY TIME    Who are you requesting to speak with (clinical staff, provider,  specific staff member): DR ALMENDAREZ    What was the call regarding: PATIENT STATES THAT SHE WOULD LIKE SOMEONE TO CALL HER BACK TO DISCUSS MASSAGE MACHINE.     Is it okay if the provider responds through MyChart: NO

## 2024-12-18 ENCOUNTER — TELEPHONE (OUTPATIENT)
Dept: FAMILY MEDICINE CLINIC | Facility: CLINIC | Age: 74
End: 2024-12-18
Payer: MEDICARE

## 2024-12-18 NOTE — TELEPHONE ENCOUNTER
A gentleman stopped by the office and dropped an office note request off for us. I have placed this request in Dr. Lubin's folder for review. Will you contact patient to make sure she requested a lymphedema compression device? We have to confirm the patient requested and from this company before releasing any records.

## 2025-02-04 ENCOUNTER — OFFICE VISIT (OUTPATIENT)
Dept: FAMILY MEDICINE CLINIC | Facility: CLINIC | Age: 75
End: 2025-02-04
Payer: MEDICARE

## 2025-02-04 VITALS
SYSTOLIC BLOOD PRESSURE: 144 MMHG | TEMPERATURE: 97.3 F | HEART RATE: 76 BPM | OXYGEN SATURATION: 98 % | BODY MASS INDEX: 31.34 KG/M2 | HEIGHT: 61 IN | WEIGHT: 166 LBS | DIASTOLIC BLOOD PRESSURE: 78 MMHG

## 2025-02-04 DIAGNOSIS — Z13.0 SCREENING FOR DEFICIENCY ANEMIA: ICD-10-CM

## 2025-02-04 DIAGNOSIS — Z00.00 MEDICARE ANNUAL WELLNESS VISIT, SUBSEQUENT: Primary | ICD-10-CM

## 2025-02-04 DIAGNOSIS — R73.9 ELEVATED BLOOD SUGAR LEVEL: ICD-10-CM

## 2025-02-04 DIAGNOSIS — Z13.6 SCREENING FOR ISCHEMIC HEART DISEASE: ICD-10-CM

## 2025-02-04 DIAGNOSIS — B35.1 ONYCHOMYCOSIS: ICD-10-CM

## 2025-02-04 DIAGNOSIS — Z78.0 POSTMENOPAUSAL: ICD-10-CM

## 2025-02-04 DIAGNOSIS — L60.0 INGROWN TOENAIL: ICD-10-CM

## 2025-02-04 PROCEDURE — 1160F RVW MEDS BY RX/DR IN RCRD: CPT | Performed by: STUDENT IN AN ORGANIZED HEALTH CARE EDUCATION/TRAINING PROGRAM

## 2025-02-04 PROCEDURE — 1159F MED LIST DOCD IN RCRD: CPT | Performed by: STUDENT IN AN ORGANIZED HEALTH CARE EDUCATION/TRAINING PROGRAM

## 2025-02-04 PROCEDURE — 1126F AMNT PAIN NOTED NONE PRSNT: CPT | Performed by: STUDENT IN AN ORGANIZED HEALTH CARE EDUCATION/TRAINING PROGRAM

## 2025-02-04 PROCEDURE — G0439 PPPS, SUBSEQ VISIT: HCPCS | Performed by: STUDENT IN AN ORGANIZED HEALTH CARE EDUCATION/TRAINING PROGRAM

## 2025-02-04 NOTE — ASSESSMENT & PLAN NOTE
Patient was counseled in regards to maintaining a healthy lifestyle, rich in whole grains, fruits and vegetables. Limit high saturated fats and processed sugars. Maintain an active lifestyle to promote overall health and well being.

## 2025-02-05 LAB
ALBUMIN SERPL-MCNC: 4.3 G/DL (ref 3.5–5.2)
ALBUMIN/GLOB SERPL: 1.7 G/DL
ALP SERPL-CCNC: 118 U/L (ref 39–117)
ALT SERPL-CCNC: 19 U/L (ref 1–33)
AST SERPL-CCNC: 20 U/L (ref 1–32)
BASOPHILS # BLD AUTO: 0.05 10*3/MM3 (ref 0–0.2)
BASOPHILS NFR BLD AUTO: 0.7 % (ref 0–1.5)
BILIRUB SERPL-MCNC: 0.4 MG/DL (ref 0–1.2)
BUN SERPL-MCNC: 16 MG/DL (ref 8–23)
BUN/CREAT SERPL: 22.9 (ref 7–25)
CALCIUM SERPL-MCNC: 10 MG/DL (ref 8.6–10.5)
CHLORIDE SERPL-SCNC: 103 MMOL/L (ref 98–107)
CHOLEST SERPL-MCNC: 168 MG/DL (ref 0–200)
CO2 SERPL-SCNC: 28.4 MMOL/L (ref 22–29)
CREAT SERPL-MCNC: 0.7 MG/DL (ref 0.57–1)
EGFRCR SERPLBLD CKD-EPI 2021: 89.8 ML/MIN/1.73
EOSINOPHIL # BLD AUTO: 0.2 10*3/MM3 (ref 0–0.4)
EOSINOPHIL NFR BLD AUTO: 2.8 % (ref 0.3–6.2)
ERYTHROCYTE [DISTWIDTH] IN BLOOD BY AUTOMATED COUNT: 13.2 % (ref 12.3–15.4)
GLOBULIN SER CALC-MCNC: 2.6 GM/DL
GLUCOSE SERPL-MCNC: 90 MG/DL (ref 65–99)
HBA1C MFR BLD: 5.3 % (ref 4.8–5.6)
HCT VFR BLD AUTO: 39.9 % (ref 34–46.6)
HDLC SERPL-MCNC: 58 MG/DL (ref 40–60)
HGB BLD-MCNC: 13.6 G/DL (ref 12–15.9)
IMM GRANULOCYTES # BLD AUTO: 0.01 10*3/MM3 (ref 0–0.05)
IMM GRANULOCYTES NFR BLD AUTO: 0.1 % (ref 0–0.5)
LDLC SERPL CALC-MCNC: 86 MG/DL (ref 0–100)
LYMPHOCYTES # BLD AUTO: 1.93 10*3/MM3 (ref 0.7–3.1)
LYMPHOCYTES NFR BLD AUTO: 27 % (ref 19.6–45.3)
MCH RBC QN AUTO: 31.1 PG (ref 26.6–33)
MCHC RBC AUTO-ENTMCNC: 34.1 G/DL (ref 31.5–35.7)
MCV RBC AUTO: 91.3 FL (ref 79–97)
MONOCYTES # BLD AUTO: 0.39 10*3/MM3 (ref 0.1–0.9)
MONOCYTES NFR BLD AUTO: 5.4 % (ref 5–12)
NEUTROPHILS # BLD AUTO: 4.58 10*3/MM3 (ref 1.7–7)
NEUTROPHILS NFR BLD AUTO: 64 % (ref 42.7–76)
NRBC BLD AUTO-RTO: 0 /100 WBC (ref 0–0.2)
PLATELET # BLD AUTO: 216 10*3/MM3 (ref 140–450)
POTASSIUM SERPL-SCNC: 4.7 MMOL/L (ref 3.5–5.2)
PROT SERPL-MCNC: 6.9 G/DL (ref 6–8.5)
RBC # BLD AUTO: 4.37 10*6/MM3 (ref 3.77–5.28)
SODIUM SERPL-SCNC: 140 MMOL/L (ref 136–145)
TRIGL SERPL-MCNC: 135 MG/DL (ref 0–150)
VLDLC SERPL CALC-MCNC: 24 MG/DL (ref 5–40)
WBC # BLD AUTO: 7.16 10*3/MM3 (ref 3.4–10.8)

## 2025-02-18 ENCOUNTER — TRANSCRIBE ORDERS (OUTPATIENT)
Dept: PHYSICAL THERAPY | Facility: HOSPITAL | Age: 75
End: 2025-02-18
Payer: MEDICARE

## 2025-02-18 DIAGNOSIS — C50.412 MALIGNANT NEOPLASM OF UPPER-OUTER QUADRANT OF LEFT FEMALE BREAST, UNSPECIFIED ESTROGEN RECEPTOR STATUS: ICD-10-CM

## 2025-02-18 DIAGNOSIS — I89.0 LYMPHEDEMA: Primary | ICD-10-CM

## 2025-03-12 ENCOUNTER — HOSPITAL ENCOUNTER (OUTPATIENT)
Dept: PHYSICAL THERAPY | Facility: HOSPITAL | Age: 75
Setting detail: THERAPIES SERIES
Discharge: HOME | End: 2025-03-12
Payer: MEDICARE

## 2025-03-12 DIAGNOSIS — I89.0 LYMPHEDEMA OF UPPER EXTREMITY: Primary | ICD-10-CM

## 2025-03-12 DIAGNOSIS — Z17.1 MALIGNANT NEOPLASM OF UPPER-OUTER QUADRANT OF LEFT BREAST IN FEMALE, ESTROGEN RECEPTOR NEGATIVE: ICD-10-CM

## 2025-03-12 DIAGNOSIS — C50.412 MALIGNANT NEOPLASM OF UPPER-OUTER QUADRANT OF LEFT BREAST IN FEMALE, ESTROGEN RECEPTOR NEGATIVE: ICD-10-CM

## 2025-03-12 PROCEDURE — 97535 SELF CARE MNGMENT TRAINING: CPT

## 2025-03-12 NOTE — THERAPY RE-EVALUATION
Physical Therapy Lymphedema Re-Evaluation  [facility]     Patient Name: America Crews  : 1950  MRN: [id number]  Today's Date: 3/12/2025      Visit Date: 2025    Visit Dx:    ICD-10-CM ICD-9-CM   1. Lymphedema of upper extremity  I89.0 457.1   2. Malignant neoplasm of upper-outer quadrant of left breast in female, estrogen receptor negative  C50.412 174.4    Z17.1 V86.1       Patient Active Problem List   Diagnosis    Breast cancer    Lymphedema of upper extremity    Medicare annual wellness visit, subsequent    Screen for colon cancer    Elevated BP without diagnosis of hypertension        Past Medical History:   Diagnosis Date    Breast cancer     Left    Colon polyp     Fibroids     H/O abnormal mammogram     with biopsy showing lobular carcinoma in situ    H/O left breast biopsy     History of medical problems     Hx of bilateral mastectomy 2009    Lymphedema 2010    Ovarian cysts     Skin cancer     Nose        Past Surgical History:   Procedure Laterality Date    BREAST BIOPSY Left 1990    BREAST BIOPSY Left 2009    BREAST BIOPSY Right     BREAST SURGERY Bilateral     lobular carcinoma in situ  excision    COLONOSCOPY  2010    Dr Clarke    COLONOSCOPY N/A 2021    Procedure: COLONOSCOPY to cecum and TI with cold polypectomy and cold snare polypectomy;  Surgeon: Blaise Clarke MD;  Location: [facility] ENDOSCOPY;  Service: Gastroenterology;  Laterality: N/A;  pre: screening  Post: diverticulosis, polyps    COLONOSCOPY  2021    HYSTEROSCOPY  2000    LYMPH NODE BIOPSY  2009    MASTECTOMY Bilateral        Visit Dx:    ICD-10-CM ICD-9-CM   1. Lymphedema of upper extremity  I89.0 457.1   2. Malignant neoplasm of upper-outer quadrant of left breast in female, estrogen receptor negative  C50.412 174.4    Z17.1 V86.1            Lymphedema       Row Name 25 0700             Subjective Pain    Able to rate subjective pain? yes  -LB       Pre-Treatment Pain Level 0  -LB      Post-Treatment Pain Level 0  -LB         Subjective    Subjective Comments I have been using the pump. I worked out in the yard yesterday so I am sure that affects it.  -LB         Lymphedema Assessment    Lymphedema Classification secondary;LUE:;stage 2 (Spontaneously Irreversible);RUE:;stage 1 (Spontaneously Reversible)  -LB      Lymphedema Cancer Related Sx axillary dissection;radical mastectomy;sentinel node biopsy;simple mastectomy  -LB      Lymphedema Surgery Comments 2009  -LB      Lymph Nodes Removed # 15  -LB      Positive Lymph Nodes # 8  -LB      Chemo Received yes  -LB      Radiation Therapy Received yes  -LB      Infections or Cellulitis? no  -LB         Posture/Observations    Posture/Observations Comments LUE lymphedema  -LB         Lymphedema Edema Assessment    Ptting Edema Category By grade out of 4  -LB      Pitting Edema + 2/4;Moderate  left  -LB         Lymphedema Sensation    Lymphedema Sensation Tests light touch  -LB      Lymphedema Light Touch LUE:;mild impairment;RUE:;WNL  -LB         Lymphedema Measurements    Measurement Type(s) Circumferential  -LB      Circumferential Areas Upper extremities  -LB         LUE Quick Girth (cm)    Axilla 38.1 cm  -LB      Mid upper arm 36.5 cm  -LB      Elbow 32.7 cm  -LB      Mid forearm 33 cm  -LB      Wrist crease 21.2 cm  -LB      Web space 22 cm  -LB      Met-heads 20.3 cm  -LB      LUE Quick Girth Total 203.8  -LB         BUE Circumferential (cm)    Measurement Location 1 --  -LB      Measurement Location 2 --  -LB                User Key  (r) = Recorded By, (t) = Taken By, (c) = Cosigned By      Initials Name Provider Type    Lolita Frausto PT Physical Therapist                                    Therapy Education  Education Details: discussed POC, garment options, considerations for travel, use of pneumatic pump  Given: Symptoms/condition management, Edema management  Program: Reinforced  How Provided:  Verbal  Provided to: Patient  Level of Understanding: Verbalized, Demonstrated, Teach back education performed  69829 - PT Self Care/Mgmt Minutes: 30       OP Exercises       Row Name 03/12/25 0700             Subjective    Subjective Comments I have been using the pump. I worked out in the yard yesterday so I am sure that affects it.  -LB         Subjective Pain    Able to rate subjective pain? yes  -LB      Pre-Treatment Pain Level 0  -LB      Post-Treatment Pain Level 0  -LB                User Key  (r) = Recorded By, (t) = Taken By, (c) = Cosigned By      Initials Name Provider Type    Lolita Frausto, PT Physical Therapist                                 PT OP Goals       Row Name 03/12/25 1300          Long Term Goals    LTG Date to Achieve 09/29/23  -LB     LTG 1 Pt will be independent with continued management of LUE lymphedema.  -LB     LTG 1 Progress Ongoing  -LB     LTG 1 Progress Comments discussed addition of glove and velcro wrap today  -LB     LTG 2 Pt will demonstrate a net dec in LUE cirucmferential measurements by >6cm.  -LB     LTG 2 Progress Ongoing  -LB     LTG 2 Progress Comments inc from last session but stable compared to last year  -LB               User Key  (r) = Recorded By, (t) = Taken By, (c) = Cosigned By      Initials Name Provider Type    Lolita Frausto, PT Physical Therapist                     PT Assessment/Plan       Row Name 03/12/25 1315          PT Assessment    Functional Limitations Limitation in home management;Limitations in community activities;Performance in leisure activities;Performance in self-care ADL;Other (comment)  -LB     Impairments Impaired lymphatic circulation;Edema  -LB     Assessment Comments Pt returns for 3 month follow up. She has been using pneumatic pump and taping LUE. LUE measurements are stable compared to prior year but inc since last visit today. We once again discussed possibly starting CDT to reduce LUE but pt does not want to pursue at this  time. We also discussed addition of 20-30 mmHG custom velcro garment to address continued LUE edema for upcoming travel as well as 20-30 mmHg compression glove to address fluctuating symptoms with yardwork. Pt will require custom UE garment as her upper arm circumference is different than her wrist and an off the shelf garment would not provide proper fit. We reviewed risk of infection and need to protect LUE if possible. Pt understands. She will be fitted for custom UE velcro wrap such as Circaid Juxtafit and will fit into off the shelf medi [place] 20-30 mmHg glove size V. She remains appropriate for continued skilled PT services to address deficits as needed and continue to monitor lymphedema.  -LB     Rehab Potential Good  -LB     Patient/caregiver participated in establishment of treatment plan and goals Yes  -LB     Patient would benefit from skilled therapy intervention Yes  -LB        PT Plan    PT Frequency Other (comment)  -LB     Predicted Duration of Therapy Intervention (PT) return for reassessment in 3 months  -LB     Planned CPT's? PT EVAL LOW COMPLEXITY: 37327;PT RE-EVAL: 18925;PT THER PROC EA 15 MIN: 03872;PT THER ACT EA 15 MIN: 43280;PT MANUAL THERAPY EA 15 MIN: 67351;PT NEUROMUSC RE-EDUCATION EA 15 MIN: 34922;PT SELF CARE/HOME MGMT/TRAIN EA 15: 07136;PT BIS XTRACELL FLUID ANALYSIS: 12268  -LB        Therapy Assessment/Plan (OT)    Predicted Duration of Therapy Intervention (OT) reassess following garment obtaining  -LB               User Key  (r) = Recorded By, (t) = Taken By, (c) = Cosigned By      Initials Name Provider Type    Lolita Frausto PT Physical Therapist                                 Time Calculation:   Start Time: 0745  Stop Time: 0830  Time Calculation (min): 45 min  Total Timed Code Minutes- PT: 30 minute(s)  Timed Charges  71398 - PT Self Care/Mgmt Minutes: 30  Total Minutes  Timed Charges Total Minutes: 30   Total Minutes: 30   Therapy Charges for Today       Code Description  Service Date Service Provider Modifiers Qty    97034647057 HC PT SELF CARE/MGMT/TRAIN EA 15 MIN 3/12/2025 Lolita Yousif, PT GP 2                      Lolita Yousif, PT  3/12/2025

## 2025-03-27 ENCOUNTER — HOSPITAL ENCOUNTER (OUTPATIENT)
Dept: PHYSICAL THERAPY | Facility: HOSPITAL | Age: 75
Setting detail: THERAPIES SERIES
End: 2025-03-27
Payer: MEDICARE

## 2025-04-04 ENCOUNTER — DOCUMENTATION (OUTPATIENT)
Dept: PHYSICAL THERAPY | Facility: HOSPITAL | Age: 75
End: 2025-04-04
Payer: MEDICARE

## 2025-04-04 DIAGNOSIS — I89.0 LYMPHEDEMA OF UPPER EXTREMITY: Primary | ICD-10-CM

## 2025-04-04 DIAGNOSIS — Z00.00 MEDICARE ANNUAL WELLNESS VISIT, SUBSEQUENT: ICD-10-CM

## 2025-04-04 DIAGNOSIS — Z17.1 MALIGNANT NEOPLASM OF UPPER-OUTER QUADRANT OF LEFT BREAST IN FEMALE, ESTROGEN RECEPTOR NEGATIVE: ICD-10-CM

## 2025-04-04 DIAGNOSIS — C50.412 MALIGNANT NEOPLASM OF UPPER-OUTER QUADRANT OF LEFT BREAST IN FEMALE, ESTROGEN RECEPTOR NEGATIVE: ICD-10-CM

## 2025-04-04 NOTE — THERAPY TREATMENT NOTE
Outpatient Physical Therapy Lymphedema Treatment Note       Patient Name: America Crews  : 1950  MRN: [id number]  Today's Date: 2025        Visit Date: 2025    Visit Dx:    ICD-10-CM ICD-9-CM   1. Lymphedema of upper extremity  I89.0 457.1   2. Malignant neoplasm of upper-outer quadrant of left breast in female, estrogen receptor negative  C50.412 174.4    Z17.1 V86.1       Patient Active Problem List   Diagnosis    Breast cancer    Lymphedema of upper extremity    Medicare annual wellness visit, subsequent    Screen for colon cancer    Elevated BP without diagnosis of hypertension                           PT Assessment/Plan       Row Name 25 0808          PT Assessment    Assessment Comments Pt with persistent LUE lymphedema that is slightly inc today with only treatment of K tape daily as well as addition of pneumatic pump recently with good response. Pt with upcoming travel and will benefit from addition of RTW Juxtafit essential arm, Size 4, Left, Quantity x 1, RTW hand wrap, medium, Left, Quantity x 1, MTM mondi 350 glove CCL2,Left,porous row at ending Quantity x 1, MTM Mondi 350 arm sleeve CCL2, silicone top band Quantity x 1, MTM arm Profile (A-G) Left, oversleeve, Quantity x 1. Pt will require custom UE garment as her upper arm circumference is different than her wrist and an off the shelf garment would not provide proper fit.  -AGAPITO               User Key  (r) = Recorded By, (t) = Taken By, (c) = Cosigned By      Initials Name Provider Type    Lolita Frausto, PT Physical Therapist                                                          Time Calculation:                     Lolita Yousif PT  2025

## 2025-06-09 ENCOUNTER — HOSPITAL ENCOUNTER (OUTPATIENT)
Dept: PHYSICAL THERAPY | Facility: HOSPITAL | Age: 75
Setting detail: THERAPIES SERIES
Discharge: HOME | End: 2025-06-09
Payer: MEDICARE

## 2025-06-09 DIAGNOSIS — I89.0 LYMPHEDEMA OF UPPER EXTREMITY: Primary | ICD-10-CM

## 2025-06-09 DIAGNOSIS — C50.412 MALIGNANT NEOPLASM OF UPPER-OUTER QUADRANT OF LEFT BREAST IN FEMALE, ESTROGEN RECEPTOR NEGATIVE: ICD-10-CM

## 2025-06-09 DIAGNOSIS — Z17.1 MALIGNANT NEOPLASM OF UPPER-OUTER QUADRANT OF LEFT BREAST IN FEMALE, ESTROGEN RECEPTOR NEGATIVE: ICD-10-CM

## 2025-06-09 PROCEDURE — 97535 SELF CARE MNGMENT TRAINING: CPT

## 2025-06-09 NOTE — THERAPY RE-EVALUATION
Physical Therapy Lymphedema Re-Evaluation  [facility]     Patient Name: America Crews  : 1950  MRN: [id number]  Today's Date: 2025      Visit Date: 2025    Visit Dx:    ICD-10-CM ICD-9-CM   1. Lymphedema of upper extremity  I89.0 457.1   2. Malignant neoplasm of upper-outer quadrant of left breast in female, estrogen receptor negative  C50.412 174.4    Z17.1 V86.1       Patient Active Problem List   Diagnosis    Breast cancer    Lymphedema of upper extremity    Medicare annual wellness visit, subsequent    Screen for colon cancer    Elevated BP without diagnosis of hypertension        Past Medical History:   Diagnosis Date    Breast cancer     Left    Colon polyp     Fibroids     H/O abnormal mammogram     with biopsy showing lobular carcinoma in situ    H/O left breast biopsy     History of medical problems     Hx of bilateral mastectomy 2009    Lymphedema 2010    Ovarian cysts     Skin cancer     Nose        Past Surgical History:   Procedure Laterality Date    BREAST BIOPSY Left 1990    BREAST BIOPSY Left 2009    BREAST BIOPSY Right     BREAST SURGERY Bilateral     lobular carcinoma in situ  excision    COLONOSCOPY  2010    Dr Clarke    COLONOSCOPY N/A 2021    Procedure: COLONOSCOPY to cecum and TI with cold polypectomy and cold snare polypectomy;  Surgeon: Blaise Clarke MD;  Location: [facility] ENDOSCOPY;  Service: Gastroenterology;  Laterality: N/A;  pre: screening  Post: diverticulosis, polyps    COLONOSCOPY  2021    HYSTEROSCOPY  2000    LYMPH NODE BIOPSY  2009    MASTECTOMY Bilateral        Visit Dx:    ICD-10-CM ICD-9-CM   1. Lymphedema of upper extremity  I89.0 457.1   2. Malignant neoplasm of upper-outer quadrant of left breast in female, estrogen receptor negative  C50.412 174.4    Z17.1 V86.1            Lymphedema       Row Name 25 0800             Subjective Pain    Able to rate subjective pain? yes  -LB      Pre-Treatment  Pain Level 0  -LB      Post-Treatment Pain Level 0  -LB         Subjective    Subjective Comments I am doing ok. I got these sleeves and I am not sure they are working.  -LB         Lymphedema Assessment    Lymphedema Classification secondary;LUE:;stage 2 (Spontaneously Irreversible);RUE:;stage 1 (Spontaneously Reversible)  -LB      Lymphedema Cancer Related Sx axillary dissection;radical mastectomy;sentinel node biopsy;simple mastectomy  -LB      Lymphedema Surgery Comments 2009  -LB      Lymph Nodes Removed # 15  -LB      Positive Lymph Nodes # 8  -LB      Chemo Received yes  -LB      Radiation Therapy Received yes  -LB      Infections or Cellulitis? no  -LB         Posture/Observations    Posture/Observations Comments LUE lymphedema  -LB         Lymphedema Edema Assessment    Ptting Edema Category By grade out of 4  -LB      Pitting Edema + 2/4;Moderate  left  -LB         Lymphedema Sensation    Lymphedema Sensation Tests light touch  -LB      Lymphedema Light Touch LUE:;mild impairment;RUE:;WNL  -LB         Lymphedema Measurements    Measurement Type(s) Circumferential  -LB      Circumferential Areas Upper extremities  -LB         LUE Quick Girth (cm)    Axilla 37 cm  -LB      Mid upper arm 37.2 cm  -LB      Elbow 31.2 cm  -LB      Mid forearm 30.2 cm  -LB      Wrist crease 18.9 cm  -LB      Web space 21.2 cm  -LB      Met-heads 20.4 cm  -LB      LUE Quick Girth Total 196.1  -LB                User Key  (r) = Recorded By, (t) = Taken By, (c) = Cosigned By      Initials Name Provider Type    LB Lolita Yousif, PT Physical Therapist                                    Therapy Education  Education Details: discussed fit of garments, considerations for wear time and slow inc in time, use of pump, elevation, MLD  Given: Symptoms/condition management, Edema management  Program: Reinforced  How Provided: Verbal  Provided to: Patient  Level of Understanding: Verbalized, Demonstrated, Teach back education performed  89189  - PT Self Care/Mgmt Minutes: 30       OP Exercises       Row Name 06/09/25 0800             Subjective    Subjective Comments I am doing ok. I got these sleeves and I am not sure they are working.  -LB         Subjective Pain    Able to rate subjective pain? yes  -LB      Pre-Treatment Pain Level 0  -LB      Post-Treatment Pain Level 0  -LB                User Key  (r) = Recorded By, (t) = Taken By, (c) = Cosigned By      Initials Name Provider Type    Lolita Frausto, PT Physical Therapist                                 PT OP Goals       Row Name 06/09/25 0900          Long Term Goals    LTG Date to Achieve 09/29/23  -LB     LTG 1 Pt will be independent with continued management of LUE lymphedema.  -LB     LTG 1 Progress Ongoing  -LB     LTG 1 Progress Comments reviewed use of compression garments  -LB     LTG 2 Pt will demonstrate a net dec in LUE cirucmferential measurements by >6cm.  -LB     LTG 2 Progress Partially Met  -LB     LTG 2 Progress Comments dec by 7 cm. today compared to last visit  -LB               User Key  (r) = Recorded By, (t) = Taken By, (c) = Cosigned By      Initials Name Provider Type    Lolita Frausto, PT Physical Therapist                     PT Assessment/Plan       Row Name 06/09/25 0957          PT Assessment    Functional Limitations Limitation in home management;Limitations in community activities;Performance in leisure activities;Performance in self-care ADL;Other (comment)  -LB     Impairments Impaired lymphatic circulation;Edema  -LB     Assessment Comments Pt returns after acquiring LUE compression sleeve and glove. She has trialed for only brief period of time and felt that it was causing L hand swelling so she removed. We discussed slowly inc time spent in garments to further assess success/modify if needed. LUE circumferential measurements are reduced today by a net of 7 cm. which is much improved and better than last 2 years. She will inc time in garments over next few  weeks and return for reassessment prior to  travel. She remains appropriate for continued skilled PT services to address deficits as needed and continue to monitor lymphedema.  -LB     Rehab Potential Good  -LB     Patient/caregiver participated in establishment of treatment plan and goals Yes  -LB     Patient would benefit from skilled therapy intervention Yes  -LB        PT Plan    PT Frequency Other (comment)  -LB     Predicted Duration of Therapy Intervention (PT) return for reassessment in 3 months  -LB     Planned CPT's? PT EVAL LOW COMPLEXITY: 08823;PT RE-EVAL: 19063;PT THER PROC EA 15 MIN: 27722;PT THER ACT EA 15 MIN: 34229;PT MANUAL THERAPY EA 15 MIN: 29725;PT NEUROMUSC RE-EDUCATION EA 15 MIN: 11476;PT SELF CARE/HOME MGMT/TRAIN EA 15: 66034;PT BIS XTRACELL FLUID ANALYSIS: 38856  -LB     PT Plan Comments reassess in 3 months, success with sleeve use?, plan for air travel  -LB               User Key  (r) = Recorded By, (t) = Taken By, (c) = Cosigned By      Initials Name Provider Type    LB Lolita Yousif PT Physical Therapist                                 Time Calculation:   Start Time: 0745  Stop Time: 0815  Time Calculation (min): 30 min  Total Timed Code Minutes- PT: 30 minute(s)  Timed Charges  21752 - PT Self Care/Mgmt Minutes: 30  Total Minutes  Timed Charges Total Minutes: 30   Total Minutes: 30   Therapy Charges for Today       Code Description Service Date Service Provider Modifiers Qty    27461083397 HC PT SELF CARE/MGMT/TRAIN EA 15 MIN 6/9/2025 Lolita Yousif, BENJA GP 2                      Lolita Yousif PT  6/9/2025

## (undated) DEVICE — THE TORRENT IRRIGATION SCOPE CONNECTOR IS USED WITH THE TORRENT IRRIGATION TUBING TO PROVIDE IRRIGATION FLUIDS SUCH AS STERILE WATER DURING GASTROINTESTINAL ENDOSCOPIC PROCEDURES WHEN USED IN CONJUNCTION WITH AN IRRIGATION PUMP (OR ELECTROSURGICAL UNIT).: Brand: TORRENT

## (undated) DEVICE — KT ORCA ORCAPOD DISP STRL

## (undated) DEVICE — LN SMPL CO2 SHTRM SD STREAM W/M LUER

## (undated) DEVICE — SENSR O2 OXIMAX FNGR A/ 18IN NONSTR

## (undated) DEVICE — TUBING, SUCTION, 1/4" X 10', STRAIGHT: Brand: MEDLINE

## (undated) DEVICE — ADAPT CLN BIOGUARD AIR/H2O DISP

## (undated) DEVICE — CANN O2 ETCO2 FITS ALL CONN CO2 SMPL A/ 7IN DISP LF